# Patient Record
Sex: FEMALE | Race: WHITE | NOT HISPANIC OR LATINO | URBAN - METROPOLITAN AREA
[De-identification: names, ages, dates, MRNs, and addresses within clinical notes are randomized per-mention and may not be internally consistent; named-entity substitution may affect disease eponyms.]

---

## 2020-08-30 ENCOUNTER — INPATIENT (INPATIENT)
Facility: HOSPITAL | Age: 65
LOS: 17 days | Discharge: HOME HEALTH | End: 2020-09-17
Attending: HOSPITALIST | Admitting: HOSPITALIST
Payer: MEDICAID

## 2020-08-30 VITALS
DIASTOLIC BLOOD PRESSURE: 94 MMHG | TEMPERATURE: 98 F | HEART RATE: 105 BPM | SYSTOLIC BLOOD PRESSURE: 134 MMHG | OXYGEN SATURATION: 96 % | RESPIRATION RATE: 19 BRPM

## 2020-08-30 DIAGNOSIS — I26.99 OTHER PULMONARY EMBOLISM WITHOUT ACUTE COR PULMONALE: ICD-10-CM

## 2020-08-30 DIAGNOSIS — R09.89 OTHER SPECIFIED SYMPTOMS AND SIGNS INVOLVING THE CIRCULATORY AND RESPIRATORY SYSTEMS: ICD-10-CM

## 2020-08-30 DIAGNOSIS — R74.0 NONSPECIFIC ELEVATION OF LEVELS OF TRANSAMINASE AND LACTIC ACID DEHYDROGENASE [LDH]: ICD-10-CM

## 2020-08-30 DIAGNOSIS — Z90.89 ACQUIRED ABSENCE OF OTHER ORGANS: Chronic | ICD-10-CM

## 2020-08-30 DIAGNOSIS — Z90.49 ACQUIRED ABSENCE OF OTHER SPECIFIED PARTS OF DIGESTIVE TRACT: Chronic | ICD-10-CM

## 2020-08-30 LAB
ALBUMIN SERPL ELPH-MCNC: 3.7 G/DL — SIGNIFICANT CHANGE UP (ref 3.5–5.2)
ALP SERPL-CCNC: 91 U/L — SIGNIFICANT CHANGE UP (ref 30–115)
ALT FLD-CCNC: 13 U/L — SIGNIFICANT CHANGE UP (ref 0–41)
ANION GAP SERPL CALC-SCNC: 13 MMOL/L — SIGNIFICANT CHANGE UP (ref 7–14)
APTT BLD: 24.3 SEC — LOW (ref 27–39.2)
AST SERPL-CCNC: 18 U/L — SIGNIFICANT CHANGE UP (ref 0–41)
BASE EXCESS BLDV CALC-SCNC: 0.2 MMOL/L — SIGNIFICANT CHANGE UP (ref -2–2)
BASOPHILS # BLD AUTO: 0.02 K/UL — SIGNIFICANT CHANGE UP (ref 0–0.2)
BASOPHILS NFR BLD AUTO: 0.1 % — SIGNIFICANT CHANGE UP (ref 0–1)
BILIRUB SERPL-MCNC: 0.6 MG/DL — SIGNIFICANT CHANGE UP (ref 0.2–1.2)
BUN SERPL-MCNC: 39 MG/DL — HIGH (ref 10–20)
CA-I SERPL-SCNC: 1.15 MMOL/L — SIGNIFICANT CHANGE UP (ref 1.12–1.3)
CALCIUM SERPL-MCNC: 9.1 MG/DL — SIGNIFICANT CHANGE UP (ref 8.5–10.1)
CHLORIDE SERPL-SCNC: 96 MMOL/L — LOW (ref 98–110)
CO2 SERPL-SCNC: 21 MMOL/L — SIGNIFICANT CHANGE UP (ref 17–32)
CREAT SERPL-MCNC: 1 MG/DL — SIGNIFICANT CHANGE UP (ref 0.7–1.5)
D DIMER BLD IA.RAPID-MCNC: 3024 NG/ML DDU — HIGH (ref 0–230)
EOSINOPHIL # BLD AUTO: 0.02 K/UL — SIGNIFICANT CHANGE UP (ref 0–0.7)
EOSINOPHIL NFR BLD AUTO: 0.1 % — SIGNIFICANT CHANGE UP (ref 0–8)
GAS PNL BLDV: 136 MMOL/L — SIGNIFICANT CHANGE UP (ref 136–145)
GAS PNL BLDV: SIGNIFICANT CHANGE UP
GLUCOSE BLDC GLUCOMTR-MCNC: 114 MG/DL — HIGH (ref 70–99)
GLUCOSE SERPL-MCNC: 126 MG/DL — HIGH (ref 70–99)
HCO3 BLDV-SCNC: 24 MMOL/L — SIGNIFICANT CHANGE UP (ref 22–29)
HCT VFR BLD CALC: 32.2 % — LOW (ref 37–47)
HCT VFR BLDA CALC: 34.5 % — SIGNIFICANT CHANGE UP (ref 34–44)
HGB BLD CALC-MCNC: 11.3 G/DL — LOW (ref 14–18)
HGB BLD-MCNC: 10.9 G/DL — LOW (ref 12–16)
IMM GRANULOCYTES NFR BLD AUTO: 0.5 % — HIGH (ref 0.1–0.3)
INR BLD: 1.1 RATIO — SIGNIFICANT CHANGE UP (ref 0.65–1.3)
LACTATE BLDV-MCNC: 1.4 MMOL/L — SIGNIFICANT CHANGE UP (ref 0.5–1.6)
LYMPHOCYTES # BLD AUTO: 1.45 K/UL — SIGNIFICANT CHANGE UP (ref 1.2–3.4)
LYMPHOCYTES # BLD AUTO: 8.2 % — LOW (ref 20.5–51.1)
MAGNESIUM SERPL-MCNC: 1.8 MG/DL — SIGNIFICANT CHANGE UP (ref 1.8–2.4)
MCHC RBC-ENTMCNC: 30.2 PG — SIGNIFICANT CHANGE UP (ref 27–31)
MCHC RBC-ENTMCNC: 33.9 G/DL — SIGNIFICANT CHANGE UP (ref 32–37)
MCV RBC AUTO: 89.2 FL — SIGNIFICANT CHANGE UP (ref 81–99)
MONOCYTES # BLD AUTO: 0.87 K/UL — HIGH (ref 0.1–0.6)
MONOCYTES NFR BLD AUTO: 4.9 % — SIGNIFICANT CHANGE UP (ref 1.7–9.3)
NEUTROPHILS # BLD AUTO: 15.28 K/UL — HIGH (ref 1.4–6.5)
NEUTROPHILS NFR BLD AUTO: 86.2 % — HIGH (ref 42.2–75.2)
NRBC # BLD: 0 /100 WBCS — SIGNIFICANT CHANGE UP (ref 0–0)
NT-PROBNP SERPL-SCNC: 6178 PG/ML — HIGH (ref 0–300)
PCO2 BLDV: 37 MMHG — LOW (ref 41–51)
PH BLDV: 7.42 — SIGNIFICANT CHANGE UP (ref 7.26–7.43)
PLATELET # BLD AUTO: 272 K/UL — SIGNIFICANT CHANGE UP (ref 130–400)
PO2 BLDV: 22 MMHG — SIGNIFICANT CHANGE UP (ref 20–40)
POTASSIUM BLDV-SCNC: 3.9 MMOL/L — SIGNIFICANT CHANGE UP (ref 3.3–5.6)
POTASSIUM SERPL-MCNC: 4.1 MMOL/L — SIGNIFICANT CHANGE UP (ref 3.5–5)
POTASSIUM SERPL-SCNC: 4.1 MMOL/L — SIGNIFICANT CHANGE UP (ref 3.5–5)
PROT SERPL-MCNC: 6.7 G/DL — SIGNIFICANT CHANGE UP (ref 6–8)
PROTHROM AB SERPL-ACNC: 12.6 SEC — SIGNIFICANT CHANGE UP (ref 9.95–12.87)
RBC # BLD: 3.61 M/UL — LOW (ref 4.2–5.4)
RBC # FLD: 12.1 % — SIGNIFICANT CHANGE UP (ref 11.5–14.5)
SAO2 % BLDV: 31 % — SIGNIFICANT CHANGE UP
SARS-COV-2 RNA SPEC QL NAA+PROBE: SIGNIFICANT CHANGE UP
SODIUM SERPL-SCNC: 130 MMOL/L — LOW (ref 135–146)
TROPONIN T SERPL-MCNC: 0.1 NG/ML — CRITICAL HIGH
WBC # BLD: 17.73 K/UL — HIGH (ref 4.8–10.8)
WBC # FLD AUTO: 17.73 K/UL — HIGH (ref 4.8–10.8)

## 2020-08-30 PROCEDURE — 71275 CT ANGIOGRAPHY CHEST: CPT | Mod: 26

## 2020-08-30 PROCEDURE — 93010 ELECTROCARDIOGRAM REPORT: CPT

## 2020-08-30 PROCEDURE — 99291 CRITICAL CARE FIRST HOUR: CPT | Mod: 25

## 2020-08-30 PROCEDURE — 93308 TTE F-UP OR LMTD: CPT | Mod: 26

## 2020-08-30 PROCEDURE — 71045 X-RAY EXAM CHEST 1 VIEW: CPT | Mod: 26

## 2020-08-30 RX ORDER — LEVOTHYROXINE SODIUM 125 MCG
50 TABLET ORAL DAILY
Refills: 0 | Status: DISCONTINUED | OUTPATIENT
Start: 2020-08-30 | End: 2020-09-17

## 2020-08-30 RX ORDER — CHLORHEXIDINE GLUCONATE 213 G/1000ML
1 SOLUTION TOPICAL
Refills: 0 | Status: DISCONTINUED | OUTPATIENT
Start: 2020-08-30 | End: 2020-09-17

## 2020-08-30 RX ORDER — PANTOPRAZOLE SODIUM 20 MG/1
40 TABLET, DELAYED RELEASE ORAL ONCE
Refills: 0 | Status: DISCONTINUED | OUTPATIENT
Start: 2020-08-30 | End: 2020-08-30

## 2020-08-30 RX ORDER — PANTOPRAZOLE SODIUM 20 MG/1
8 TABLET, DELAYED RELEASE ORAL
Qty: 80 | Refills: 0 | Status: DISCONTINUED | OUTPATIENT
Start: 2020-08-30 | End: 2020-08-31

## 2020-08-30 RX ORDER — HEPARIN SODIUM 5000 [USP'U]/ML
1500 INJECTION INTRAVENOUS; SUBCUTANEOUS
Qty: 25000 | Refills: 0 | Status: DISCONTINUED | OUTPATIENT
Start: 2020-08-30 | End: 2020-08-31

## 2020-08-30 RX ORDER — HEPARIN SODIUM 5000 [USP'U]/ML
3000 INJECTION INTRAVENOUS; SUBCUTANEOUS EVERY 6 HOURS
Refills: 0 | Status: DISCONTINUED | OUTPATIENT
Start: 2020-08-30 | End: 2020-08-30

## 2020-08-30 RX ORDER — HEPARIN SODIUM 5000 [USP'U]/ML
6500 INJECTION INTRAVENOUS; SUBCUTANEOUS EVERY 6 HOURS
Refills: 0 | Status: DISCONTINUED | OUTPATIENT
Start: 2020-08-30 | End: 2020-08-30

## 2020-08-30 RX ORDER — HEPARIN SODIUM 5000 [USP'U]/ML
6500 INJECTION INTRAVENOUS; SUBCUTANEOUS ONCE
Refills: 0 | Status: COMPLETED | OUTPATIENT
Start: 2020-08-30 | End: 2020-08-30

## 2020-08-30 RX ORDER — HEPARIN SODIUM 5000 [USP'U]/ML
INJECTION INTRAVENOUS; SUBCUTANEOUS
Qty: 25000 | Refills: 0 | Status: DISCONTINUED | OUTPATIENT
Start: 2020-08-30 | End: 2020-08-30

## 2020-08-30 RX ADMIN — HEPARIN SODIUM 6500 UNIT(S): 5000 INJECTION INTRAVENOUS; SUBCUTANEOUS at 19:09

## 2020-08-30 RX ADMIN — PANTOPRAZOLE SODIUM 10 MG/HR: 20 TABLET, DELAYED RELEASE ORAL at 20:07

## 2020-08-30 RX ADMIN — HEPARIN SODIUM 1500 UNIT(S)/HR: 5000 INJECTION INTRAVENOUS; SUBCUTANEOUS at 19:10

## 2020-08-30 RX ADMIN — HEPARIN SODIUM 15 UNIT(S)/HR: 5000 INJECTION INTRAVENOUS; SUBCUTANEOUS at 20:08

## 2020-08-30 NOTE — H&P ADULT - NSHPSOCIALHISTORY_GEN_ALL_CORE
The patient denies any history of tobacco use; drinks 1/2 glass of wine weekly; denies illicit drug use.

## 2020-08-30 NOTE — H&P ADULT - ATTENDING COMMENTS
Patient seen and examined, agree with above, high risk submassive PE, ? gastric ulcer, on iv heparin, echo, LE doppler, IR for thormbectomy, heparin GI eval, serial PTT

## 2020-08-30 NOTE — ED PROVIDER NOTE - NS ED ROS FT
Constitutional: See HPI.  Eyes: No visual changes, eye pain or discharge.  ENMT: No hearing changes, pain, discharge or infections.   Cardiac: + chest pain, + dyspnea on exertion, + chest pain with exertion.  Respiratory: + cough. No  respiratory distress. No hemoptysis.   GI: No nausea, vomiting, diarrhea or abdominal pain.  : No dysuria, frequency or burning. No Discharge  MS: No myalgia, muscle weakness, joint pain or back pain.  Neuro: No headache or weakness.   Skin: No skin rash.  Except as documented in the HPI, all other systems are negative.

## 2020-08-30 NOTE — H&P ADULT - ASSESSMENT
The patient is a 64 year-old female with a PMH of HTN and hypothyroidism and a PSH of appendectomy and tonsillectomy presenting for shortness of breath, admitted for pulmonary embolism.            # CHG  # DVT ppx- heparin gtt  # GI ppx- pantoprazole gtt  # Diet- NPO after midnight for thrombectomy in the AM  # Activity- bedrest for now  # Dispo- acute pending possible thrombectomy, on hep gtt The patient is a 64 year-old female with a PMH of HTN and hypothyroidism and a PSH of appendectomy and tonsillectomy presenting for shortness of breath, admitted for pulmonary embolism.    # Shortness of breath likely secondary to acute pulmonary embolism  Trops 0.10 on presentation  CT angio showed b/l central PE extending to subsegmental branches w/ evidence of R heart strain  C/w heparin gtt; serial aPTTs (keep 50-60 as recent h/o gastric ulcer)  F/u IR for thrombectomy in AM  F/u 4:30 AM and 11 AM trops, AM CBC, BMP, Mg  Vitals per ICU protocol  Admit to ICU    # Gastric ulcer at pylorus  No evidence of active bleed  C/w Protonix gtt for now  F/u AM CBC    # H/o hypertension  Patient reports was prescribed lisinopril but stopped taking it several weeks ago as BP normalized  Vitals per routine  Restart agent as indicated    # CHG  # DVT ppx- heparin gtt  # GI ppx- pantoprazole gtt  # Diet- NPO after midnight for thrombectomy in the AM  # Activity- bedrest for now  # Dispo- acute pending possible thrombectomy, on hep gtt

## 2020-08-30 NOTE — ED PROVIDER NOTE - ATTENDING CONTRIBUTION TO CARE
64 y.o male w/ hx of DM, HTN presents to the ED for evaluation of NUNO X 3, assocaited with midsternal chest pain. pt has no headache, abd pain, weakness, numness. on exam pt mild tachypneic, s1s2 ctab soft nt/nd, no calf swelling.    impression sob, bedside echo showing large RV, plan for CT chest PE, labs, cardiac biomarks, ekg and re-eval

## 2020-08-30 NOTE — CONSULT NOTE ADULT - SUBJECTIVE AND OBJECTIVE BOX
INTERVENTIONAL RADIOLOGY CONSULT:     Procedure Requested: Pulmonary artery thrombectomy    HPI: 64 y.o female w/ hx of DM, HTN presents to the ED for evaluation of dyspnea.  For past 3 days NUNO, after walking a few steps.  Associated w/ midsternal chest pain, pleuritic, moderate severity, no radiation of pain. family reports pt recently complaining of lower back and L hip pain.  Had CT 8/24 which showed abnormality of stomach.  Had upper endoscopy which showed extensive gastric ulcer and was started on PPI. On CT had abnormal finding of femur concern for malignancy.  Pt also complaining of lumbar back pain, had MRI which showed severe spinal stenosis.  Pt has no known malignancies and no known cardiac hx. No fever, chills, cough, back pain, urinary sxs      PAST MEDICAL & SURGICAL HISTORY:  Diabetes  HTN (hypertension)  Gastric ulcer      MEDICATIONS  (STANDING):  chlorhexidine 4% Liquid 1 Application(s) Topical <User Schedule>  heparin  Infusion 1500 Unit(s)/Hr (15 mL/Hr) IV Continuous <Continuous>  pantoprazole Infusion 8 mG/Hr (10 mL/Hr) IV Continuous <Continuous>    MEDICATIONS  (PRN):      Allergies    No Known Allergies      Physical Exam:   Vital Signs Last 24 Hrs  T(C): 36.4 (30 Aug 2020 15:45), Max: 36.4 (30 Aug 2020 15:45)  T(F): 97.6 (30 Aug 2020 15:45), Max: 97.6 (30 Aug 2020 15:45)  HR: 99 (30 Aug 2020 19:28) (95 - 105)  BP: 120/73 (30 Aug 2020 19:28) (120/73 - 134/94)  BP(mean): --  RR: 20 (30 Aug 2020 19:28) (19 - 20)  SpO2: 96% (30 Aug 2020 19:28) (96% - 96%)      Labs:                         10.9   17.73 )-----------( 272      ( 30 Aug 2020 17:01 )             32.2     08-30    130<L>  |  96<L>  |  39<H>  ----------------------------<  126<H>  4.1   |  21  |  1.0    Ca    9.1      30 Aug 2020 17:01  Mg     1.8     08-30    TPro  6.7  /  Alb  3.7  /  TBili  0.6  /  DBili  x   /  AST  18  /  ALT  13  /  AlkPhos  91  08-30    PT/INR - ( 30 Aug 2020 17:01 )   PT: 12.60 sec;   INR: 1.10 ratio         PTT - ( 30 Aug 2020 17:01 )  PTT:24.3 sec    Pertinent labs:                      10.9   17.73 )-----------( 272      ( 30 Aug 2020 17:01 )             32.2       08-30    130<L>  |  96<L>  |  39<H>  ----------------------------<  126<H>  4.1   |  21  |  1.0    Ca    9.1      30 Aug 2020 17:01  Mg     1.8     08-30    TPro  6.7  /  Alb  3.7  /  TBili  0.6  /  DBili  x   /  AST  18  /  ALT  13  /  AlkPhos  91  08-30      PT/INR - ( 30 Aug 2020 17:01 )   PT: 12.60 sec;   INR: 1.10 ratio         PTT - ( 30 Aug 2020 17:01 )  PTT:24.3 sec    Radiology & Additional Studies:     Radiology imaging reviewed.       ASSESSMENT/ PLAN:     CTA reviewed. Right greater than left PA thrombus with evidence of right heart strain. HD significant for tachycardia.    - high risk submassive PE  - please trend cardiac enzymes.  - continue systemic AC and NPO tonight after midnight for pulmonary angiogram tomorrow with possible thrombectomy  - please obtain 2D Echo and lower extremity dopplers.    Case discussed with ICU fellow.    Thank you for the courtesy of this consult, please call [phone] with any further questions.

## 2020-08-30 NOTE — H&P ADULT - NSHPPHYSICALEXAM_GEN_ALL_CORE
Vital Signs Last 24 Hrs  T(C): 36.6 (30 Aug 2020 22:30), Max: 36.6 (30 Aug 2020 22:30)  T(F): 97.9 (30 Aug 2020 22:30), Max: 97.9 (30 Aug 2020 22:30)  HR: 100 (30 Aug 2020 22:30) (93 - 105)  BP: 151/86 (30 Aug 2020 22:30) (120/73 - 151/86)  BP(mean): 106 (30 Aug 2020 22:30) (106 - 106)  RR: 28 (30 Aug 2020 22:30) (19 - 28)  SpO2: 99% (30 Aug 2020 22:30) (96% - 99%)    Physical examination:  General: AAOx4, NAD, of pleasant disposition  HEENT: atraumatic, normocephalic  CV: NS1, S2, slightly tachycardic; no murmurs, rubs, gallops  Pulm: clear to auscultation bilaterally; slightly tachypneic; non-cyanotic; on rm air  Gastrointestinal: soft, nontender, nondistended  Extremities: non-edematous; non-erythematous  Neuro: nonfocal; nondysarthric

## 2020-08-30 NOTE — H&P ADULT - HISTORY OF PRESENT ILLNESS
The patient is a 64 year-old female with a PMH of HTN and hypothyroidism and a PSH of appendectomy and tonsillectomy presenting for shortness of breath. The patient reports that on Thursday she began experiencing shortness of breath on minimal ambulation associated w/ weakness and dizziness; she denied associated chest pain or pressure; as the shortness of breath worsened she presented to the ED today.    Of note, about 1 week ago she started having pain in the left hip; had an MRI done which showed spinal stenosis at L3-L4 and L4-L5; was given a "steroid shot" which did not help the pain. Around the same time the patient began complaining of nausea; started to have non-bloody non-bilious vomiting; had an EGD in [place] (records faxed to us, in chart) which revealed thickening of the gastric pylorus with a deep ulcer suspicious for perforation but with no free air; she was started on a PPI BID which she reports alleviated the nausea and vomiting.     In the ED, T 97.6 F, , /94, RR 19; O2 sat 94%. CT angio chest revealed bilateral central pulmonary embolism extending into subsegmental branches w/ evidence of right heart strain. She was started on a heparin gtt; IR was c/s and are planning to do a thrombectomy tomorrow in the AM. The patient reports no recent history of trauma or any history of blood clotting disorders either in herself or in her family. The patient is a 64 year-old female with a PMH of HTN and hypothyroidism and a PSH of appendectomy and tonsillectomy presenting for shortness of breath. The patient reports that on Thursday she began experiencing shortness of breath on minimal ambulation associated w/ weakness and dizziness; she denied associated chest pain or pressure; as the shortness of breath worsened she presented to the ED today.    Of note, about 1 week ago she started having pain in the left hip; had an MRI done which showed spinal stenosis at L3-L4 and L4-L5; was given a "steroid shot" which did not help the pain. Around the same time the patient began complaining of nausea; started to have non-bloody non-bilious vomiting; had an EGD in [place] (records faxed to us, in chart) which revealed thickening of the gastric pylorus with a deep ulcer suspicious for perforation but with no free air; she was started on a PPI BID which she reports alleviated the nausea and vomiting.     In the ED, T 97.6 F, , /94, RR 19; O2 sat 94%. CT angio chest revealed bilateral central pulmonary embolism extending into subsegmental branches w/ evidence of right heart strain. She was started on a heparin gtt; IR was c/s and are planning to do a thrombectomy tomorrow in the AM. The patient reports no recent history of trauma or any history of blood clotting disorders either in herself or in her family. pert called started on heparin and protonix drip hb stable

## 2020-08-30 NOTE — H&P ADULT - NSHPLABSRESULTS_GEN_ALL_CORE
10.9   17.73 )-----------( 272      ( 30 Aug 2020 17:01 )             32.2   08-30    130<L>  |  96<L>  |  39<H>  ----------------------------<  126<H>  4.1   |  21  |  1.0    Ca    9.1      30 Aug 2020 17:01  Mg     1.8     08-30    TPro  6.7  /  Alb  3.7  /  TBili  0.6  /  DBili  x   /  AST  18  /  ALT  13  /  AlkPhos  91  08-30    < from: CT Angio Chest w/ IV Cont (08.30.20 @ 18:00) >    INTERPRETATION:  CLINICAL HISTORY / REASON FOR EXAM: Shortness of breath    TECHNIQUE: Multislice helical sections were obtained from the thoracic inlet to the lung bases during rapid administration of 100 mL of Omnipaque 350 intravenous contrast using a CTA pulmonary embolism protocol. Thin sections were reconstructed through the pulmonary vasculature. Coronal, sagittal and 3D/MIP reformatted images are also submitted.    COMPARISON: None..    FINDINGS:    PULMONARY EMBOLUS:Bilateral central pulmonary embolism extending into subsegmental branches.    TUBES/LINES: None.    LUNGS, PLEURA, AIRWAYS: No pneumothorax. No consolidation or pleural effusion. Central airways patent.    THORACIC NODES: No mediastinal or axillary lymphadenopathy.    MEDIASTINUM/GREAT VESSELS: There is evidence of right heart strain with a RV to LV ratio of 4:2. No pericardial effusion. The great vessels are normal in caliber..    VISUALIZED UPPER ABDOMEN: Unremarkable.    BONES/SOFT TISSUES: Degenerative changes of the thoracic spine.    IMPRESSION:    1.  Bilateral central pulmonary embolism extending into subsegmental branches.  2.  Evidence of right heart strain.      ***Please see the addendum at the top of this report. It may contain additional important information or changes.****    -----  < from: Xray Chest 1 View-PORTABLE IMMEDIATE (08.30.20 @ 18:53) >    INTERPRETATION:  Clinical History / Reason for exam: Chest pain, pulmonary emboli    Comparison : Chest radiograph concurrent chest CT    Technique/Positioning: AP portable.    Findings:    Support devices: None.    Cardiac/mediastinum/hilum: Tortuous aorta without cardiomegaly.    Lung parenchyma/Pleura: Within normal limits.    Skeleton/soft tissues: Unremarkable.    Impression:    No radiographic evidence of acute cardiopulmonary disease.

## 2020-08-30 NOTE — ED PROVIDER NOTE - PHYSICAL EXAMINATION
CONST: Well appearing in NAD  EYES:  Sclera and conjunctiva clear.  CARD: tachycardic, regular  RESP: + tachypnea, + accessory muscle use, not table to speak in full sentences, Equal BS B/L, moderate distress, spo2 90% Ra, 92% on 2 L, 96% on 4L  GI: Soft, non-tender, non-distended.  MS: Normal ROM in all extremities. 2+ edema of lower extremities, no calf pain, radial pulses 2+ bilaterally  SKIN: Warm, dry, no acute rashes. Good turgor  NEURO: A&Ox3, No focal deficits. Strength 5/5 with no sensory deficits.

## 2020-08-30 NOTE — ED PROVIDER NOTE - PROGRESS NOTE DETAILS
bedside with rv dilation pt taken to ct urgently for suspected PE Discussed case w/ pulm fellow.  aware of pt. admit to ICU under elsayegh.  will contact IR and see if pert tram needs to be activated. Discussed case w/ pulm fellow cecilia.  aware of pt. admit to ICU under elsannalisaeglucita.  will contact IR and see if pert tram needs to be activated. Discussed case w/ ICU fellow. received results of ct from family via fax.  States deep gastric ulcer.  per family on PPI for them.  Spoke w/ Dr. garland(family friend) who spoke w/ GI who scoped pt.  if giving heparin, give PPI.  Will hold on heparin for now and icu will speak w/ IR for potential treatment options. pt presented with NUNO X 3 days, chest pain, pt with mild elev trop of 0.1, , elevated BNP, ct angio showing bl segmental PE with heart strain. icu, pe response team consulted. pt well appearing, bp stable admitted to ICU

## 2020-08-30 NOTE — ED PROVIDER NOTE - OBJECTIVE STATEMENT
64 y.o male w/ hx of DM, HTN presents to the ED for evaluation of dyspnea.  For past 3 days NUNO, after walking a few steps bust stope to rest.  Associated w/ midsternal chest pain, pleuritic, moderate severity, no radiation of pain. family reports pt recently complaining of lower back and L hip pain.  Had CT 8/24 which showed abnormality of stomach.  Had upper endoscopy which showed extensive gastric ulcer and was started on PPI. On CT had abnormal finding of femur concern for malignancy.  Pt also complaining of lumbar back pain, had MRI which showed severe spinal stenosis.  Pt has no known malignancies and no known cardiac hx. No fever, chills, cough, back pain, urinary sxs.

## 2020-08-30 NOTE — ED PROVIDER NOTE - CLINICAL SUMMARY MEDICAL DECISION MAKING FREE TEXT BOX
pt presented with NUNO X 3 days, chest pain, pt with mild elev trop of 0.1, , elevated BNP, ct angio showing bl segmental PE with heart strain. icu, pe response team consulted. pt well appearing, bp stable admitted to ICU.

## 2020-08-30 NOTE — ED PROVIDER NOTE - LANGUAGE ASSISTANCE NEEDED
No-Patient/Caregiver offered and refused free interpretation services. [id number]/No-Patient/Caregiver offered and refused free interpretation services.

## 2020-08-31 LAB
ALBUMIN SERPL ELPH-MCNC: 3.5 G/DL — SIGNIFICANT CHANGE UP (ref 3.5–5.2)
ALP SERPL-CCNC: 84 U/L — SIGNIFICANT CHANGE UP (ref 30–115)
ALT FLD-CCNC: 11 U/L — SIGNIFICANT CHANGE UP (ref 0–41)
ANION GAP SERPL CALC-SCNC: 16 MMOL/L — HIGH (ref 7–14)
APTT BLD: 100.9 SEC — CRITICAL HIGH (ref 27–39.2)
APTT BLD: 117.7 SEC — CRITICAL HIGH (ref 27–39.2)
APTT BLD: 82.8 SEC — CRITICAL HIGH (ref 27–39.2)
APTT BLD: 92.7 SEC — CRITICAL HIGH (ref 27–39.2)
AST SERPL-CCNC: 16 U/L — SIGNIFICANT CHANGE UP (ref 0–41)
BASOPHILS # BLD AUTO: 0.03 K/UL — SIGNIFICANT CHANGE UP (ref 0–0.2)
BASOPHILS NFR BLD AUTO: 0.2 % — SIGNIFICANT CHANGE UP (ref 0–1)
BILIRUB SERPL-MCNC: 0.7 MG/DL — SIGNIFICANT CHANGE UP (ref 0.2–1.2)
BUN SERPL-MCNC: 34 MG/DL — HIGH (ref 10–20)
CALCIUM SERPL-MCNC: 8.9 MG/DL — SIGNIFICANT CHANGE UP (ref 8.5–10.1)
CHLORIDE SERPL-SCNC: 97 MMOL/L — LOW (ref 98–110)
CO2 SERPL-SCNC: 19 MMOL/L — SIGNIFICANT CHANGE UP (ref 17–32)
CREAT SERPL-MCNC: 0.9 MG/DL — SIGNIFICANT CHANGE UP (ref 0.7–1.5)
EOSINOPHIL # BLD AUTO: 0.12 K/UL — SIGNIFICANT CHANGE UP (ref 0–0.7)
EOSINOPHIL NFR BLD AUTO: 0.8 % — SIGNIFICANT CHANGE UP (ref 0–8)
GLUCOSE SERPL-MCNC: 116 MG/DL — HIGH (ref 70–99)
HCT VFR BLD CALC: 31.9 % — LOW (ref 37–47)
HCV AB S/CO SERPL IA: 0.04 COI — SIGNIFICANT CHANGE UP
HCV AB SERPL-IMP: SIGNIFICANT CHANGE UP
HGB BLD-MCNC: 10.5 G/DL — LOW (ref 12–16)
IMM GRANULOCYTES NFR BLD AUTO: 0.5 % — HIGH (ref 0.1–0.3)
INR BLD: 1.2 RATIO — SIGNIFICANT CHANGE UP (ref 0.65–1.3)
LYMPHOCYTES # BLD AUTO: 15.4 % — LOW (ref 20.5–51.1)
LYMPHOCYTES # BLD AUTO: 2.36 K/UL — SIGNIFICANT CHANGE UP (ref 1.2–3.4)
MAGNESIUM SERPL-MCNC: 1.9 MG/DL — SIGNIFICANT CHANGE UP (ref 1.8–2.4)
MCHC RBC-ENTMCNC: 29.7 PG — SIGNIFICANT CHANGE UP (ref 27–31)
MCHC RBC-ENTMCNC: 32.9 G/DL — SIGNIFICANT CHANGE UP (ref 32–37)
MCV RBC AUTO: 90.1 FL — SIGNIFICANT CHANGE UP (ref 81–99)
MONOCYTES # BLD AUTO: 1.17 K/UL — HIGH (ref 0.1–0.6)
MONOCYTES NFR BLD AUTO: 7.6 % — SIGNIFICANT CHANGE UP (ref 1.7–9.3)
NEUTROPHILS # BLD AUTO: 11.55 K/UL — HIGH (ref 1.4–6.5)
NEUTROPHILS NFR BLD AUTO: 75.5 % — HIGH (ref 42.2–75.2)
NRBC # BLD: 0 /100 WBCS — SIGNIFICANT CHANGE UP (ref 0–0)
PLATELET # BLD AUTO: 263 K/UL — SIGNIFICANT CHANGE UP (ref 130–400)
POTASSIUM SERPL-MCNC: 3.8 MMOL/L — SIGNIFICANT CHANGE UP (ref 3.5–5)
POTASSIUM SERPL-SCNC: 3.8 MMOL/L — SIGNIFICANT CHANGE UP (ref 3.5–5)
PROT SERPL-MCNC: 6.2 G/DL — SIGNIFICANT CHANGE UP (ref 6–8)
PROTHROM AB SERPL-ACNC: 13.8 SEC — HIGH (ref 9.95–12.87)
RBC # BLD: 3.54 M/UL — LOW (ref 4.2–5.4)
RBC # FLD: 12.2 % — SIGNIFICANT CHANGE UP (ref 11.5–14.5)
SARS-COV-2 IGG SERPL QL IA: NEGATIVE — SIGNIFICANT CHANGE UP
SARS-COV-2 IGM SERPL IA-ACNC: 0.08 INDEX — SIGNIFICANT CHANGE UP
SODIUM SERPL-SCNC: 132 MMOL/L — LOW (ref 135–146)
TROPONIN T SERPL-MCNC: 0.08 NG/ML — CRITICAL HIGH
WBC # BLD: 15.3 K/UL — HIGH (ref 4.8–10.8)
WBC # FLD AUTO: 15.3 K/UL — HIGH (ref 4.8–10.8)

## 2020-08-31 PROCEDURE — 37185 PRIM ART M-THRMBC SBSQ VSL: CPT

## 2020-08-31 PROCEDURE — 99152 MOD SED SAME PHYS/QHP 5/>YRS: CPT

## 2020-08-31 PROCEDURE — 76937 US GUIDE VASCULAR ACCESS: CPT | Mod: 26

## 2020-08-31 PROCEDURE — 99232 SBSQ HOSP IP/OBS MODERATE 35: CPT

## 2020-08-31 PROCEDURE — 36015 PLACE CATHETER IN ARTERY: CPT | Mod: RT,59

## 2020-08-31 PROCEDURE — 71045 X-RAY EXAM CHEST 1 VIEW: CPT | Mod: 26

## 2020-08-31 PROCEDURE — 93306 TTE W/DOPPLER COMPLETE: CPT | Mod: 26

## 2020-08-31 PROCEDURE — 37184 PRIM ART M-THRMBC 1ST VSL: CPT | Mod: 50

## 2020-08-31 PROCEDURE — 93970 EXTREMITY STUDY: CPT | Mod: 26

## 2020-08-31 RX ORDER — HEPARIN SODIUM 5000 [USP'U]/ML
1000 INJECTION INTRAVENOUS; SUBCUTANEOUS
Qty: 25000 | Refills: 0 | Status: DISCONTINUED | OUTPATIENT
Start: 2020-08-31 | End: 2020-09-02

## 2020-08-31 RX ORDER — OXYCODONE AND ACETAMINOPHEN 5; 325 MG/1; MG/1
1 TABLET ORAL EVERY 6 HOURS
Refills: 0 | Status: DISCONTINUED | OUTPATIENT
Start: 2020-08-31 | End: 2020-09-07

## 2020-08-31 RX ORDER — HEPARIN SODIUM 5000 [USP'U]/ML
1400 INJECTION INTRAVENOUS; SUBCUTANEOUS
Qty: 25000 | Refills: 0 | Status: DISCONTINUED | OUTPATIENT
Start: 2020-08-31 | End: 2020-08-31

## 2020-08-31 RX ORDER — PANTOPRAZOLE SODIUM 20 MG/1
40 TABLET, DELAYED RELEASE ORAL
Refills: 0 | Status: DISCONTINUED | OUTPATIENT
Start: 2020-08-31 | End: 2020-09-06

## 2020-08-31 RX ADMIN — PANTOPRAZOLE SODIUM 40 MILLIGRAM(S): 20 TABLET, DELAYED RELEASE ORAL at 17:56

## 2020-08-31 RX ADMIN — OXYCODONE AND ACETAMINOPHEN 1 TABLET(S): 5; 325 TABLET ORAL at 03:00

## 2020-08-31 RX ADMIN — Medication 50 MICROGRAM(S): at 07:04

## 2020-08-31 RX ADMIN — HEPARIN SODIUM 10 UNIT(S)/HR: 5000 INJECTION INTRAVENOUS; SUBCUTANEOUS at 20:53

## 2020-08-31 RX ADMIN — OXYCODONE AND ACETAMINOPHEN 1 TABLET(S): 5; 325 TABLET ORAL at 02:23

## 2020-08-31 NOTE — PROGRESS NOTE ADULT - SUBJECTIVE AND OBJECTIVE BOX
INTERVENTIONAL RADIOLOGY BRIEF-OPERATIVE NOTE    Procedure: Pulmonary angiogram, suction thrombectomy bilateral pulmonary arteries    Pre-Op Diagnosis: Pulmonary embolus    Post-Op Diagnosis: Same    Attending: Elliot Landau  Resident: None    Anesthesia (type):  [ ] General Anesthesia  [x] Sedation  [ ] Spinal Anesthesia  [x] Local/Regional    Contrast: 75 cc Optiray    Estimated Blood Loss: Approximately 75 cc    Condition:   [ ] Critical  [ ] Serious  [x] Fair   [ ] Good    Findings/Follow up Plan of Care: Pulmonary angiogram demonstrated extensive filling defects bilaterally. Suction thrombectomy performed using Inari device, aspiration of extensive clot throughout both lungs. Post thrombectomy angiogram demonstrated resolution. Patient tolerated procedure well. Right femoral access hemostasis achieved with manual compression.    Specimens Removed: None    Implants: None    Complications: None    Disposition: Return to ICU. Please restart systemic AC.      Please call Interventional Radiology [phone] with any questions, concerns, or issues.

## 2020-08-31 NOTE — PROGRESS NOTE ADULT - SUBJECTIVE AND OBJECTIVE BOX
SUBJECTIVE:    Patient is a 64y old Female who presents with a chief complaint of pulmonary embolism (31 Aug 2020 11:42)    Currently admitted to medicine with the primary diagnosis of Pulmonary embolism     Today is hospital day 1d. This morning she is resting comfortably in bed and reports no new issues or overnight events.     ROS:   CONSTITUTIONAL: No weakness, fevers or chills   EYES/ENT: No visual changes; No vertigo or throat pain   NECK: No pain or stiffness   RESPIRATORY: No cough, wheezing, hemoptysis; No shortness of breath   CARDIOVASCULAR: No chest pain or palpitations   GASTROINTESTINAL: No abdominal or epigastric pain. No nausea, vomiting, or hematemesis; No diarrhea or constipation. No melena or hematochezia.  GENITOURINARY: No dysuria, frequency or hematuria  NEUROLOGICAL: No numbness or weakness  SKIN: No itching, rashes      PAST MEDICAL & SURGICAL HISTORY  HTN (hypertension)  Gastric ulcer  S/P tonsillectomy  S/P appendectomy    SOCIAL HISTORY:    ALLERGIES:  No Known Allergies    MEDICATIONS:  STANDING MEDICATIONS  chlorhexidine 4% Liquid 1 Application(s) Topical <User Schedule>  heparin  Infusion 1400 Unit(s)/Hr IV Continuous <Continuous>  levothyroxine 50 MICROGram(s) Oral daily  pantoprazole  Injectable 40 milliGRAM(s) IV Push two times a day    PRN MEDICATIONS  oxycodone    5 mG/acetaminophen 325 mG 1 Tablet(s) Oral every 6 hours PRN    VITALS:   T(F): 97.2  HR: 86  BP: 110/767  RR: 20  SpO2: 98%    LABS:  Negative for smoking/alcohol/drug use.                         10.5   15.30 )-----------( 263      ( 31 Aug 2020 04:24 )             31.9     08-31    132<L>  |  97<L>  |  34<H>  ----------------------------<  116<H>  3.8   |  19  |  0.9    Ca    8.9      31 Aug 2020 04:24  Mg     1.9     08-31    TPro  6.2  /  Alb  3.5  /  TBili  0.7  /  DBili  x   /  AST  16  /  ALT  11  /  AlkPhos  84  08-31    PT/INR - ( 31 Aug 2020 04:24 )   PT: 13.80 sec;   INR: 1.20 ratio         PTT - ( 31 Aug 2020 04:24 )  PTT:100.9 sec      Troponin T, Serum: 0.08 ng/mL <HH> (08-31-20 @ 04:24)  Troponin T, Serum: 0.10 ng/mL <HH> (08-30-20 @ 17:01)      CARDIAC MARKERS ( 31 Aug 2020 04:24 )  x     / 0.08 ng/mL / x     / x     / x      CARDIAC MARKERS ( 30 Aug 2020 17:01 )  x     / 0.10 ng/mL / x     / x     / x          RADIOLOGY:  no new today  PHYSICAL EXAM:  GEN: No acute distress  HEENT: normocephalic, atraumatic, aniceteric  LUNGS: Clear to auscultation bilaterally, no rales/wheezing/ rhonchi  HEART: S1/S2 present. RRR, no murmurs  ABD: Soft, non-tender, non-distended. Bowel sounds present  EXT: NC/NC/NE/2+PP/FRANCES  NEURO: AAOX3, normal affect      ASSESSMENT AND PLAN:    The patient is a 64 year-old female with a PMH of HTN and hypothyroidism and a PSH of appendectomy and tonsillectomy presenting for shortness of breath, admitted for pulmonary embolism.    # Shortness of breath secondary to submassive acute pulmonary embolism  s/p thrombectomy today w/ IR  Trops 0.10 on presentation  CT angio showed b/l central PE extending to subsegmental branches w/ evidence of R heart strain  C/w heparin gtt; serial aPTTs (keep 50-60 as recent h/o gastric ulcer)  f/u LE duplex  f/u TTE      # Gastric ulcer at pylorus  No evidence of active bleed  C/w Protonix gtt for now , call GI if there is active bleed/ drop in Hb  F/u AM CBC    # H/o hypertension  Patient reports was prescribed lisinopril but stopped taking it several weeks ago as BP normalized  Vitals per routine  Restart agent as indicated    # CHG  # DVT ppx- heparin gtt  # GI ppx- pantoprazole gtt  # Diet- advance as tolerated   # Activity- bedrest for now  # Dispo- acute

## 2020-08-31 NOTE — CONSULT NOTE ADULT - SUBJECTIVE AND OBJECTIVE BOX
Gastroenterology Consultation:    Patient is a 64y old  Female who presents with a chief complaint of pulmonary embolism (31 Aug 2020 13:56)      Admitted on: 08-30-20  HPI:  The patient is a 64 year-old female with a PMH of HTN, Recently diagnosed PUD,  and hypothyroidism and a PSH of appendectomy and tonsillectomy presenting for shortness of breath. The patient reports that on Thursday she began experiencing shortness of breath on minimal ambulation associated w/ weakness and dizziness; she denied associated chest pain or pressure; as the shortness of breath worsened she presented to the ED . CT angio in ER showed b/l PE with right heart strain. She underwent thrombectomy buy IR.    GI consulted regarding AC in the context of recently diagnosed PUD. About 1 week ago she started having pain in the left hip; had an MRI done which showed spinal stenosis at L3-L4 and L4-L5; was given a "steroid shot" which did not help the pain. Around the same time the patient began complaining of nausea; started to have non-bloody non-bilious vomiting; had an EGD in [place] (records faxed to us, in chart) which revealed posterior   In the ED, T 97.6 F, , /94, RR 19; O2 sat 94%. CT angio chest revealed bilateral central pulmonary embolism extending into subsegmental branches w/ evidence of right heart strain. She was started on a heparin gtt; IR was c/s and are planning to do a thrombectomy tomorrow in the AM. The patient reports no recent history of trauma or any history of blood clotting disorders either in herself or in her family. pert called started on heparin and protonix drip hb stable (30 Aug 2020 22:24)      Prior records Reviewed (Y/N):  History obtained from person other than patient (Y/N):    Prior EGD:  Prior Colonoscopy:      PAST MEDICAL & SURGICAL HISTORY:  HTN (hypertension)  Gastric ulcer  S/P tonsillectomy  S/P appendectomy      FAMILY HISTORY:  Family history of early CAD: son      Social History:  Tobacco:  Alcohol:  Drugs:    Home Medications:  omeprazole 40 mg oral delayed release capsule: 1 cap(s) orally 2 times a day (30 Aug 2020 19:41)  Synthroid 50 mcg (0.05 mg) oral tablet: 1 tab(s) orally once a day (30 Aug 2020 22:06)    MEDICATIONS  (STANDING):  chlorhexidine 4% Liquid 1 Application(s) Topical <User Schedule>  heparin  Infusion 1400 Unit(s)/Hr (14 mL/Hr) IV Continuous <Continuous>  levothyroxine 50 MICROGram(s) Oral daily  pantoprazole  Injectable 40 milliGRAM(s) IV Push two times a day    MEDICATIONS  (PRN):  oxycodone    5 mG/acetaminophen 325 mG 1 Tablet(s) Oral every 6 hours PRN Severe Pain (7 - 10)      Allergies  No Known Allergies      Review of Systems:   Constitutional:  No Fever, No Chills  ENT/Mouth:  No Hearing Changes,  No Difficulty Swallowing  Eyes:  No Eye Pain, No Vision Changes  Cardiovascular:  No Chest Pain, No Palpitations  Respiratory:  No Cough, No Dyspnea  Gastrointestinal:  As described in HPI  Musculoskeletal:  No Joint Swelling, No Back Pain  Skin:  No Skin Lesions, No Jaundice  Neuro:  No Syncope, No Dizziness  Heme/Lymph:  No Bruising, No Bleeding.          Physical Examination:  T(C): 36.2 (08-31-20 @ 12:00), Max: 36.6 (08-30-20 @ 22:30)  HR: 86 (08-31-20 @ 14:30) (80 - 100)  BP: 117/67 (08-31-20 @ 14:30) (101/67 - 151/86)  RR: 21 (08-31-20 @ 14:30) (17 - 28)  SpO2: 98% (08-31-20 @ 14:30) (96% - 100%)  Height (cm): 162.6 (08-30-20 @ 22:30)  Weight (kg): 84.3 (08-30-20 @ 22:30)    08-30-20 @ 07:01  -  08-31-20 @ 07:00  --------------------------------------------------------  IN: 272 mL / OUT: 200 mL / NET: 72 mL    08-31-20 @ 07:01  -  08-31-20 @ 16:20  --------------------------------------------------------  IN: 116 mL / OUT: 75 mL / NET: 41 mL        Constitutional: No acute distress.  Eyes:. Conjunctivae are clear, Sclera is non-icteric.  Ears Nose and Throat: The external ears are normal appearing,  Oral mucosa is pink and moist.  Respiratory:  No signs of respiratory distress. Lung sounds are clear bilaterally.  Cardiovascular:  S1 S2, Regular rate and rhythm.  GI: Abdomen is soft, symmetric, and non-tender without distention. There are no visible lesions. Bowel sounds are present and normoactive in all four quadrants. No masses, hepatomegaly, or splenomegaly are noted.   Neuro: No Tremor, No involuntary movements  Skin: No rashes, No Jaundice.          Data: (reviewed by attending)                        10.5   15.30 )-----------( 263      ( 31 Aug 2020 04:24 )             31.9     Hgb Trend:  10.5  08-31-20 @ 04:24  10.9  08-30-20 @ 17:01      08-31-20 @ 07:01  -  08-31-20 @ 16:20  --------------------------------------------------------  IN: 0 mL      08-31    132<L>  |  97<L>  |  34<H>  ----------------------------<  116<H>  3.8   |  19  |  0.9    Ca    8.9      31 Aug 2020 04:24  Mg     1.9     08-31    TPro  6.2  /  Alb  3.5  /  TBili  0.7  /  DBili  x   /  AST  16  /  ALT  11  /  AlkPhos  84  08-31    Liver panel trend:  TBili 0.7   /   AST 16   /   ALT 11   /   AlkP 84   /   Tptn 6.2   /   Alb 3.5    /   DBili --      08-31  TBili 0.6   /   AST 18   /   ALT 13   /   AlkP 91   /   Tptn 6.7   /   Alb 3.7    /   DBili --      08-30      PT/INR - ( 31 Aug 2020 04:24 )   PT: 13.80 sec;   INR: 1.20 ratio         PTT - ( 31 Aug 2020 04:24 )  PTT:100.9 sec        Radiology:(reviewed by attending) Gastroenterology Consultation:    Patient is a 64y old  Female who presents with a chief complaint of pulmonary embolism (31 Aug 2020 13:56)      Admitted on: 08-30-20  HPI:  The patient is a 64 year-old female with a PMH of HTN, Recently diagnosed PUD,  and hypothyroidism and a PSH of appendectomy and tonsillectomy presenting for shortness of breath. The patient reports that on Thursday she began experiencing shortness of breath on minimal ambulation associated w/ weakness and dizziness; she denied associated chest pain or pressure; as the shortness of breath worsened she presented to the ED . CT angio in ER showed b/l PE with right heart strain. She underwent thrombectomy buy IR.    GI consulted regarding AC in the context of recently diagnosed PUD. About 1 week ago she started having pain in the left hip; had an MRI done which showed spinal stenosis at L3-L4 and L4-L5; was given a "steroid shot" which did not help the pain. Around the same time the patient began complaining of nausea; started to have non-bloody non-bilious vomiting; had an EGD in [place] (records faxed to us, in chart) which revealed posterior wall duodenal bulb ulcer and antral ulcer. Patient started on PPI BID.   Patient denies any abdominal pain, melena , hematemesis or hematochezia.        Prior records Reviewed (Y/N): Y  History obtained from person other than patient (Y/N): N    Prior EGD: in the chart, 8/27/20  Prior Colonoscopy: None      PAST MEDICAL & SURGICAL HISTORY:  HTN (hypertension)  Gastric ulcer  S/P tonsillectomy  S/P appendectomy      FAMILY HISTORY:  Family history of early CAD: son      Home Medications:  omeprazole 40 mg oral delayed release capsule: 1 cap(s) orally 2 times a day (30 Aug 2020 19:41)  Synthroid 50 mcg (0.05 mg) oral tablet: 1 tab(s) orally once a day (30 Aug 2020 22:06)    MEDICATIONS  (STANDING):  chlorhexidine 4% Liquid 1 Application(s) Topical <User Schedule>  heparin  Infusion 1400 Unit(s)/Hr (14 mL/Hr) IV Continuous <Continuous>  levothyroxine 50 MICROGram(s) Oral daily  pantoprazole  Injectable 40 milliGRAM(s) IV Push two times a day    MEDICATIONS  (PRN):  oxycodone    5 mG/acetaminophen 325 mG 1 Tablet(s) Oral every 6 hours PRN Severe Pain (7 - 10)      Allergies  No Known Allergies      Review of Systems:   Constitutional:  No Fever, No Chills  ENT/Mouth:  No Hearing Changes,  No Difficulty Swallowing  Eyes:  No Eye Pain, No Vision Changes  Cardiovascular:  No Chest Pain, No Palpitations  Respiratory:  No Cough, No Dyspnea  Gastrointestinal:  As described in HPI  Musculoskeletal:  No Joint Swelling, No Back Pain  Skin:  No Skin Lesions, No Jaundice  Neuro:  No Syncope, No Dizziness  Heme/Lymph:  No Bruising, No Bleeding.          Physical Examination:  T(C): 36.2 (08-31-20 @ 12:00), Max: 36.6 (08-30-20 @ 22:30)  HR: 86 (08-31-20 @ 14:30) (80 - 100)  BP: 117/67 (08-31-20 @ 14:30) (101/67 - 151/86)  RR: 21 (08-31-20 @ 14:30) (17 - 28)  SpO2: 98% (08-31-20 @ 14:30) (96% - 100%)  Height (cm): 162.6 (08-30-20 @ 22:30)  Weight (kg): 84.3 (08-30-20 @ 22:30)    08-30-20 @ 07:01  -  08-31-20 @ 07:00  --------------------------------------------------------  IN: 272 mL / OUT: 200 mL / NET: 72 mL    08-31-20 @ 07:01  -  08-31-20 @ 16:20  --------------------------------------------------------  IN: 116 mL / OUT: 75 mL / NET: 41 mL        Constitutional: No acute distress.  Eyes:. Conjunctivae are clear, Sclera is non-icteric.  Ears Nose and Throat: The external ears are normal appearing,  Oral mucosa is pink and moist.  Respiratory:  No signs of respiratory distress. Lung sounds are clear bilaterally.  Cardiovascular:  S1 S2, Regular rate and rhythm.  GI: Abdomen is soft, symmetric, and non-tender without distention. There are no visible lesions. Bowel sounds are present and normoactive in all four quadrants. No masses, hepatomegaly, or splenomegaly are noted.   Neuro: No Tremor, No involuntary movements  Skin: No rashes, No Jaundice.          Data: (reviewed by attending)                        10.5   15.30 )-----------( 263      ( 31 Aug 2020 04:24 )             31.9     Hgb Trend:  10.5  08-31-20 @ 04:24  10.9  08-30-20 @ 17:01      08-31-20 @ 07:01  -  08-31-20 @ 16:20  --------------------------------------------------------  IN: 0 mL      08-31    132<L>  |  97<L>  |  34<H>  ----------------------------<  116<H>  3.8   |  19  |  0.9    Ca    8.9      31 Aug 2020 04:24  Mg     1.9     08-31    TPro  6.2  /  Alb  3.5  /  TBili  0.7  /  DBili  x   /  AST  16  /  ALT  11  /  AlkPhos  84  08-31    Liver panel trend:  TBili 0.7   /   AST 16   /   ALT 11   /   AlkP 84   /   Tptn 6.2   /   Alb 3.5    /   DBili --      08-31  TBili 0.6   /   AST 18   /   ALT 13   /   AlkP 91   /   Tptn 6.7   /   Alb 3.7    /   DBili --      08-30      PT/INR - ( 31 Aug 2020 04:24 )   PT: 13.80 sec;   INR: 1.20 ratio         PTT - ( 31 Aug 2020 04:24 )  PTT:100.9 sec        Radiology:(reviewed by attending)

## 2020-08-31 NOTE — CONSULT NOTE ADULT - ASSESSMENT
The patient is a 64 year-old female with a PMH of HTN, Recently diagnosed PUD,  and hypothyroidism and a PSH of appendectomy and tonsillectomy presenting for shortness of breath. found to have PE s/p thrombectomy on heparin gtt. GI consulted regarding starting AC.    # Recently diagnosed The patient is a 64 year-old female with a PMH of HTN, Recently diagnosed PUD,  and hypothyroidism and a PSH of appendectomy and tonsillectomy presenting for shortness of breath. found to have PE s/p thrombectomy on heparin gtt. GI consulted regarding starting AC.    # Recently diagnosed PUD:  - No active GI bleed  - Patient on PPI BID  - started on AC due to acute PE with R heart strain s/p thrombectomy  - No abdominal pain  - Hgb and VS stable    REC:  - monitor CBC  - c/w PPI BID  - patient needs to be on Anticoagulation due to recent PE, the benefit of being on anticoagulation outweigh the risk of bleeding from PUD at this stage  - Please call GI if any signs of GI bleed  - f/u as outpatient with her GI doctor for repeat EGD for gastric ulcer

## 2020-09-01 LAB
ALBUMIN SERPL ELPH-MCNC: 3.3 G/DL — LOW (ref 3.5–5.2)
ALP SERPL-CCNC: 81 U/L — SIGNIFICANT CHANGE UP (ref 30–115)
ALT FLD-CCNC: 15 U/L — SIGNIFICANT CHANGE UP (ref 0–41)
ANION GAP SERPL CALC-SCNC: 13 MMOL/L — SIGNIFICANT CHANGE UP (ref 7–14)
APTT BLD: 46.5 SEC — HIGH (ref 27–39.2)
APTT BLD: 48.1 SEC — HIGH (ref 27–39.2)
APTT BLD: 51 SEC — HIGH (ref 27–39.2)
APTT BLD: 59.9 SEC — HIGH (ref 27–39.2)
APTT BLD: 61.8 SEC — HIGH (ref 27–39.2)
AST SERPL-CCNC: 19 U/L — SIGNIFICANT CHANGE UP (ref 0–41)
BASOPHILS # BLD AUTO: 0.02 K/UL — SIGNIFICANT CHANGE UP (ref 0–0.2)
BASOPHILS NFR BLD AUTO: 0.2 % — SIGNIFICANT CHANGE UP (ref 0–1)
BILIRUB SERPL-MCNC: 0.5 MG/DL — SIGNIFICANT CHANGE UP (ref 0.2–1.2)
BUN SERPL-MCNC: 31 MG/DL — HIGH (ref 10–20)
CALCIUM SERPL-MCNC: 8.5 MG/DL — SIGNIFICANT CHANGE UP (ref 8.5–10.1)
CHLORIDE SERPL-SCNC: 102 MMOL/L — SIGNIFICANT CHANGE UP (ref 98–110)
CO2 SERPL-SCNC: 22 MMOL/L — SIGNIFICANT CHANGE UP (ref 17–32)
CREAT SERPL-MCNC: 0.9 MG/DL — SIGNIFICANT CHANGE UP (ref 0.7–1.5)
EOSINOPHIL # BLD AUTO: 0.16 K/UL — SIGNIFICANT CHANGE UP (ref 0–0.7)
EOSINOPHIL NFR BLD AUTO: 1.3 % — SIGNIFICANT CHANGE UP (ref 0–8)
GLUCOSE SERPL-MCNC: 105 MG/DL — HIGH (ref 70–99)
HCT VFR BLD CALC: 30.7 % — LOW (ref 37–47)
HGB BLD-MCNC: 10 G/DL — LOW (ref 12–16)
IMM GRANULOCYTES NFR BLD AUTO: 0.6 % — HIGH (ref 0.1–0.3)
LYMPHOCYTES # BLD AUTO: 1.54 K/UL — SIGNIFICANT CHANGE UP (ref 1.2–3.4)
LYMPHOCYTES # BLD AUTO: 12.5 % — LOW (ref 20.5–51.1)
MCHC RBC-ENTMCNC: 29.7 PG — SIGNIFICANT CHANGE UP (ref 27–31)
MCHC RBC-ENTMCNC: 32.6 G/DL — SIGNIFICANT CHANGE UP (ref 32–37)
MCV RBC AUTO: 91.1 FL — SIGNIFICANT CHANGE UP (ref 81–99)
MONOCYTES # BLD AUTO: 1.05 K/UL — HIGH (ref 0.1–0.6)
MONOCYTES NFR BLD AUTO: 8.5 % — SIGNIFICANT CHANGE UP (ref 1.7–9.3)
NEUTROPHILS # BLD AUTO: 9.46 K/UL — HIGH (ref 1.4–6.5)
NEUTROPHILS NFR BLD AUTO: 76.9 % — HIGH (ref 42.2–75.2)
NRBC # BLD: 0 /100 WBCS — SIGNIFICANT CHANGE UP (ref 0–0)
PLATELET # BLD AUTO: 253 K/UL — SIGNIFICANT CHANGE UP (ref 130–400)
POTASSIUM SERPL-MCNC: 4.1 MMOL/L — SIGNIFICANT CHANGE UP (ref 3.5–5)
POTASSIUM SERPL-SCNC: 4.1 MMOL/L — SIGNIFICANT CHANGE UP (ref 3.5–5)
PROT SERPL-MCNC: 5.8 G/DL — LOW (ref 6–8)
RBC # BLD: 3.37 M/UL — LOW (ref 4.2–5.4)
RBC # FLD: 12.3 % — SIGNIFICANT CHANGE UP (ref 11.5–14.5)
SODIUM SERPL-SCNC: 137 MMOL/L — SIGNIFICANT CHANGE UP (ref 135–146)
WBC # BLD: 12.3 K/UL — HIGH (ref 4.8–10.8)
WBC # FLD AUTO: 12.3 K/UL — HIGH (ref 4.8–10.8)

## 2020-09-01 RX ORDER — SODIUM CHLORIDE 9 MG/ML
1000 INJECTION INTRAMUSCULAR; INTRAVENOUS; SUBCUTANEOUS
Refills: 0 | Status: DISCONTINUED | OUTPATIENT
Start: 2020-09-01 | End: 2020-09-02

## 2020-09-01 RX ADMIN — OXYCODONE AND ACETAMINOPHEN 1 TABLET(S): 5; 325 TABLET ORAL at 05:44

## 2020-09-01 RX ADMIN — OXYCODONE AND ACETAMINOPHEN 1 TABLET(S): 5; 325 TABLET ORAL at 12:45

## 2020-09-01 RX ADMIN — PANTOPRAZOLE SODIUM 40 MILLIGRAM(S): 20 TABLET, DELAYED RELEASE ORAL at 05:44

## 2020-09-01 RX ADMIN — CHLORHEXIDINE GLUCONATE 1 APPLICATION(S): 213 SOLUTION TOPICAL at 05:45

## 2020-09-01 RX ADMIN — PANTOPRAZOLE SODIUM 40 MILLIGRAM(S): 20 TABLET, DELAYED RELEASE ORAL at 17:35

## 2020-09-01 RX ADMIN — OXYCODONE AND ACETAMINOPHEN 1 TABLET(S): 5; 325 TABLET ORAL at 06:14

## 2020-09-01 RX ADMIN — Medication 50 MICROGRAM(S): at 05:44

## 2020-09-01 RX ADMIN — OXYCODONE AND ACETAMINOPHEN 1 TABLET(S): 5; 325 TABLET ORAL at 12:15

## 2020-09-01 NOTE — PROGRESS NOTE ADULT - SUBJECTIVE AND OBJECTIVE BOX
SUBJECTIVE:    Patient is a 64y old Female who presents with a chief complaint of pulmonary embolism (01 Sep 2020 08:02)    Currently admitted to medicine with the primary diagnosis of Pulmonary embolism     Today is hospital day 2d. This morning she is resting comfortably in bed and reports no new issues or overnight events.     ROS:   CONSTITUTIONAL: No weakness, fevers or chills   EYES/ENT: No visual changes; No vertigo or throat pain   NECK: No pain or stiffness   RESPIRATORY: No cough, wheezing, hemoptysis; No shortness of breath   CARDIOVASCULAR: No chest pain or palpitations   GASTROINTESTINAL: No abdominal or epigastric pain. No nausea, vomiting, or hematemesis; No diarrhea or constipation. No melena or hematochezia.  GENITOURINARY: No dysuria, frequency or hematuria  NEUROLOGICAL: No numbness or weakness  SKIN: No itching, rashes      PAST MEDICAL & SURGICAL HISTORY  HTN (hypertension)  Gastric ulcer  S/P tonsillectomy  S/P appendectomy    SOCIAL HISTORY:    ALLERGIES:  No Known Allergies    MEDICATIONS:  STANDING MEDICATIONS  chlorhexidine 4% Liquid 1 Application(s) Topical <User Schedule>  heparin  Infusion 1000 Unit(s)/Hr IV Continuous <Continuous>  levothyroxine 50 MICROGram(s) Oral daily  pantoprazole  Injectable 40 milliGRAM(s) IV Push two times a day    PRN MEDICATIONS  oxycodone    5 mG/acetaminophen 325 mG 1 Tablet(s) Oral every 6 hours PRN    VITALS:   T(F): 98  HR: 92  BP: 125/76  RR: 22  SpO2: 96%    LABS:  Negative for smoking/alcohol/drug use.                         10.0   12.30 )-----------( 253      ( 01 Sep 2020 05:35 )             30.7     09-01    137  |  102  |  31<H>  ----------------------------<  105<H>  4.1   |  22  |  0.9    Ca    8.5      01 Sep 2020 05:35  Mg     1.9     08-31    TPro  5.8<L>  /  Alb  3.3<L>  /  TBili  0.5  /  DBili  x   /  AST  19  /  ALT  15  /  AlkPhos  81  09-01    PT/INR - ( 31 Aug 2020 04:24 )   PT: 13.80 sec;   INR: 1.20 ratio         PTT - ( 01 Sep 2020 05:35 )  PTT:46.5 sec          CARDIAC MARKERS ( 31 Aug 2020 04:24 )  x     / 0.08 ng/mL / x     / x     / x      CARDIAC MARKERS ( 30 Aug 2020 17:01 )  x     / 0.10 ng/mL / x     / x     / x          RADIOLOGY:    PHYSICAL EXAM:  GEN: No acute distress  HEENT: normocephalic, atraumatic, aniceteric  LUNGS: decreased breath sounds bilaterally, no rales/wheezing/ rhonchi  HEART: S1/S2 present. RRR, no murmurs  ABD: Soft, non-tender, non-distended. Bowel sounds present  EXT: NC/NC/NE/2+PP/FRANCES  NEURO: AAOX3, normal affect      ASSESSMENT AND PLAN:    The patient is a 64 year-old female with a PMH of HTN and hypothyroidism and a PSH of appendectomy and tonsillectomy presenting for shortness of breath, admitted for pulmonary embolism.    # Shortness of breath secondary to submassive acute pulmonary embolism  s/p thrombectomy today w/ IR  Trops 0.10 on presentation  CT angio showed b/l central PE extending to subsegmental branches w/ evidence of R heart strain  C/w heparin gtt; serial aPTTs  f/u LE duplex   TTE: mild pulmonary HTN      # Gastric ulcer at pylorus  No evidence of active bleed  C/w Protonix gtt for now , call GI if there is active bleed/ drop in Hb  F/u AM CBC    # H/o hypertension  Patient reports was prescribed lisinopril but stopped taking it several weeks ago as BP normalized  Vitals per routine  Restart agent as indicated    # CHG  # DVT ppx- heparin gtt  # GI ppx- pantoprazole bid  # Diet- advance as tolerated   # Activity- bedrest for now  # Dispo- acute SUBJECTIVE:    Patient is a 64y old Female who presents with a chief complaint of pulmonary embolism (01 Sep 2020 08:02)    Currently admitted to medicine with the primary diagnosis of Pulmonary embolism     Today is hospital day 2d. This morning she is resting comfortably in bed and reports no new issues or overnight events.     ROS:   CONSTITUTIONAL: No weakness, fevers or chills   EYES/ENT: No visual changes; No vertigo or throat pain   NECK: No pain or stiffness   RESPIRATORY: No cough, wheezing, hemoptysis; No shortness of breath   CARDIOVASCULAR: No chest pain or palpitations   GASTROINTESTINAL: No abdominal or epigastric pain. No nausea, vomiting, or hematemesis; No diarrhea or constipation. No melena or hematochezia.  GENITOURINARY: No dysuria, frequency or hematuria  NEUROLOGICAL: No numbness or weakness  SKIN: No itching, rashes      PAST MEDICAL & SURGICAL HISTORY  HTN (hypertension)  Gastric ulcer  S/P tonsillectomy  S/P appendectomy    SOCIAL HISTORY:    ALLERGIES:  No Known Allergies    MEDICATIONS:  STANDING MEDICATIONS  chlorhexidine 4% Liquid 1 Application(s) Topical <User Schedule>  heparin  Infusion 1000 Unit(s)/Hr IV Continuous <Continuous>  levothyroxine 50 MICROGram(s) Oral daily  pantoprazole  Injectable 40 milliGRAM(s) IV Push two times a day    PRN MEDICATIONS  oxycodone    5 mG/acetaminophen 325 mG 1 Tablet(s) Oral every 6 hours PRN    VITALS:   T(F): 98  HR: 92  BP: 125/76  RR: 22  SpO2: 96%    LABS:  Negative for smoking/alcohol/drug use.                         10.0   12.30 )-----------( 253      ( 01 Sep 2020 05:35 )             30.7     09-01    137  |  102  |  31<H>  ----------------------------<  105<H>  4.1   |  22  |  0.9    Ca    8.5      01 Sep 2020 05:35  Mg     1.9     08-31    TPro  5.8<L>  /  Alb  3.3<L>  /  TBili  0.5  /  DBili  x   /  AST  19  /  ALT  15  /  AlkPhos  81  09-01    PT/INR - ( 31 Aug 2020 04:24 )   PT: 13.80 sec;   INR: 1.20 ratio         PTT - ( 01 Sep 2020 05:35 )  PTT:46.5 sec          CARDIAC MARKERS ( 31 Aug 2020 04:24 )  x     / 0.08 ng/mL / x     / x     / x      CARDIAC MARKERS ( 30 Aug 2020 17:01 )  x     / 0.10 ng/mL / x     / x     / x          RADIOLOGY:    PHYSICAL EXAM:  GEN: No acute distress  HEENT: normocephalic, atraumatic, aniceteric  LUNGS: decreased breath sounds bilaterally, no rales/wheezing/ rhonchi  HEART: S1/S2 present. RRR, no murmurs  ABD: Soft, non-tender, non-distended. Bowel sounds present  EXT: NC/NC/NE/2+PP/FRANCES  NEURO: AAOX3, normal affect      ASSESSMENT AND PLAN:    The patient is a 64 year-old female with a PMH of HTN and hypothyroidism and a PSH of appendectomy and tonsillectomy presenting for shortness of breath, admitted for pulmonary embolism.    # Shortness of breath secondary to submassive acute pulmonary embolism  s/p thrombectomy today w/ IR  Trops 0.10 on presentation  CT angio showed b/l central PE extending to subsegmental branches w/ evidence of R heart strain  C/w heparin gtt; serial aPTTs  f/u LE duplex   TTE: mild pulmonary HTN      # Gastric ulcer at pylorus  No evidence of active bleed  C/w Protonix 40 mg bid , call GI if there is active bleed/ drop in Hb  F/u CBC  GI following    # H/o hypertension  Patient reports was prescribed lisinopril but stopped taking it several weeks ago as BP normalized  Vitals per routine  Restart agent as indicated    # CHG  # DVT ppx- heparin gtt  # GI ppx- pantoprazole bid  # Diet- advance as tolerated   # Activity- bedrest for now  # Dispo- acute SUBJECTIVE:    Patient is a 64y old Female who presents with a chief complaint of pulmonary embolism (01 Sep 2020 08:02)    Currently admitted to medicine with the primary diagnosis of Pulmonary embolism     Today is hospital day 2d. This morning she is resting comfortably in bed and reports no new issues or overnight events.     ROS:   CONSTITUTIONAL: No weakness, fevers or chills   EYES/ENT: No visual changes; No vertigo or throat pain   NECK: No pain or stiffness   RESPIRATORY: No cough, wheezing, hemoptysis; No shortness of breath   CARDIOVASCULAR: No chest pain or palpitations   GASTROINTESTINAL: No abdominal or epigastric pain. No nausea, vomiting, or hematemesis; No diarrhea or constipation. No melena or hematochezia.  GENITOURINARY: No dysuria, frequency or hematuria  NEUROLOGICAL: No numbness or weakness  SKIN: No itching, rashes      PAST MEDICAL & SURGICAL HISTORY  HTN (hypertension)  Gastric ulcer  S/P tonsillectomy  S/P appendectomy      ALLERGIES:  No Known Allergies    MEDICATIONS:  STANDING MEDICATIONS  chlorhexidine 4% Liquid 1 Application(s) Topical <User Schedule>  heparin  Infusion 1000 Unit(s)/Hr IV Continuous <Continuous>  levothyroxine 50 MICROGram(s) Oral daily  pantoprazole  Injectable 40 milliGRAM(s) IV Push two times a day    PRN MEDICATIONS  oxycodone    5 mG/acetaminophen 325 mG 1 Tablet(s) Oral every 6 hours PRN    VITALS:   T(F): 98  HR: 92  BP: 125/76  RR: 22  SpO2: 96%    LABS:  Negative for smoking/alcohol/drug use.                         10.0   12.30 )-----------( 253      ( 01 Sep 2020 05:35 )             30.7     09-01    137  |  102  |  31<H>  ----------------------------<  105<H>  4.1   |  22  |  0.9    Ca    8.5      01 Sep 2020 05:35  Mg     1.9     08-31    TPro  5.8<L>  /  Alb  3.3<L>  /  TBili  0.5  /  DBili  x   /  AST  19  /  ALT  15  /  AlkPhos  81  09-01    PT/INR - ( 31 Aug 2020 04:24 )   PT: 13.80 sec;   INR: 1.20 ratio         PTT - ( 01 Sep 2020 05:35 )  PTT:46.5 sec          CARDIAC MARKERS ( 31 Aug 2020 04:24 )  x     / 0.08 ng/mL / x     / x     / x      CARDIAC MARKERS ( 30 Aug 2020 17:01 )  x     / 0.10 ng/mL / x     / x     / x          RADIOLOGY:  no new today  PHYSICAL EXAM:  GEN: No acute distress  HEENT: normocephalic, atraumatic, aniceteric  LUNGS: decreased breath sounds bilaterally, no rales/wheezing/ rhonchi  HEART: S1/S2 present. RRR, no murmurs  ABD: Soft, non-tender, non-distended. Bowel sounds present  EXT: NC/NC/NE/2+PP/FRANCES  NEURO: AAOX3, normal affect      ASSESSMENT AND PLAN:    The patient is a 64 year-old female with a PMH of HTN and hypothyroidism and a PSH of appendectomy and tonsillectomy presenting for shortness of breath, admitted for pulmonary embolism.    # Shortness of breath secondary to submassive acute pulmonary embolism  s/p thrombectomy w/ IR  Trops 0.10 on presentation  CT angio showed b/l central PE extending to subsegmental branches w/ evidence of R heart strain  C/w heparin gtt; serial aPTTs  f/u LE duplex   TTE: mild pulmonary HTN      # Gastric ulcer at pylorus  No evidence of active bleed  C/w Protonix 40 mg bid , call GI if there is active bleed/ drop in Hb  F/u CBC  GI following    # H/o hypertension  Patient reports was prescribed lisinopril but stopped taking it several weeks ago as BP normalized  Vitals per routine  Restart agent as indicated    # CHG  # DVT ppx- heparin gtt  # GI ppx- pantoprazole bid  # Diet- advance as tolerated   # Activity- bedrest for now  # Dispo- acute SUBJECTIVE:    Patient is a 64y old Female who presents with a chief complaint of pulmonary embolism (01 Sep 2020 08:02)    Currently admitted to medicine with the primary diagnosis of Pulmonary embolism     Today is hospital day 2d. This morning she is resting comfortably in bed and reports no new issues or overnight events.     ROS:   CONSTITUTIONAL: No weakness, fevers or chills   EYES/ENT: No visual changes; No vertigo or throat pain   NECK: No pain or stiffness   RESPIRATORY: No cough, wheezing, hemoptysis; No shortness of breath   CARDIOVASCULAR: No chest pain or palpitations   GASTROINTESTINAL: No abdominal or epigastric pain. No nausea, vomiting, or hematemesis; No diarrhea or constipation. No melena or hematochezia.  GENITOURINARY: No dysuria, frequency or hematuria  NEUROLOGICAL: No numbness or weakness  SKIN: No itching, rashes      PAST MEDICAL & SURGICAL HISTORY  HTN (hypertension)  Gastric ulcer  S/P tonsillectomy  S/P appendectomy      ALLERGIES:  No Known Allergies    MEDICATIONS:  STANDING MEDICATIONS  chlorhexidine 4% Liquid 1 Application(s) Topical <User Schedule>  heparin  Infusion 1000 Unit(s)/Hr IV Continuous <Continuous>  levothyroxine 50 MICROGram(s) Oral daily  pantoprazole  Injectable 40 milliGRAM(s) IV Push two times a day    PRN MEDICATIONS  oxycodone    5 mG/acetaminophen 325 mG 1 Tablet(s) Oral every 6 hours PRN    VITALS:   T(F): 98  HR: 92  BP: 125/76  RR: 22  SpO2: 96%    LABS:  Negative for smoking/alcohol/drug use.                         10.0   12.30 )-----------( 253      ( 01 Sep 2020 05:35 )             30.7     09-01    137  |  102  |  31<H>  ----------------------------<  105<H>  4.1   |  22  |  0.9    Ca    8.5      01 Sep 2020 05:35  Mg     1.9     08-31    TPro  5.8<L>  /  Alb  3.3<L>  /  TBili  0.5  /  DBili  x   /  AST  19  /  ALT  15  /  AlkPhos  81  09-01    PT/INR - ( 31 Aug 2020 04:24 )   PT: 13.80 sec;   INR: 1.20 ratio         PTT - ( 01 Sep 2020 05:35 )  PTT:46.5 sec          CARDIAC MARKERS ( 31 Aug 2020 04:24 )  x     / 0.08 ng/mL / x     / x     / x      CARDIAC MARKERS ( 30 Aug 2020 17:01 )  x     / 0.10 ng/mL / x     / x     / x          RADIOLOGY:  no new today  PHYSICAL EXAM:  GEN: No acute distress  HEENT: normocephalic, atraumatic, aniceteric  LUNGS: decreased breath sounds bilaterally, no rales/wheezing/ rhonchi  HEART: S1/S2 present. RRR, no murmurs  ABD: Soft, non-tender, non-distended. Bowel sounds present  EXT: NC/NC/NE/2+PP/FRANCES  NEURO: AAOX3, normal affect      ASSESSMENT AND PLAN:    The patient is a 64 year-old female with a PMH of HTN and hypothyroidism and a PSH of appendectomy and tonsillectomy presenting for shortness of breath, admitted for pulmonary embolism.    # Shortness of breath secondary to submassive acute pulmonary embolism  s/p thrombectomy w/ IR  Trops 0.10 on presentation  CT angio showed b/l central PE extending to subsegmental branches w/ evidence of R heart strain  C/w heparin gtt; serial aPTTs  f/u LE duplex   TTE: mild pulmonary HTN      # Gastric ulcer at pylorus  No evidence of active bleed  C/w Protonix 40 mg bid , call GI if there is active bleed/ drop in Hb  F/u CBC  GI following    # H/o hypertension  Patient reports was prescribed lisinopril but stopped taking it several weeks ago as BP normalized  Vitals per routine  Restart agent as indicated    # ? Soft tissue mass   - r/o malignancy   - decreased functional status and ability to ambulate  - denies bladder or bowel incontinence , denies focal deficits  - CT Scan and MRI of L/S in chart . Soft tissue mass  - MRI L hip and Ortho consult     # CHG  # DVT ppx- heparin gtt  # GI ppx- pantoprazole bid  # Diet- advance as tolerated   # Activity- bedrest for now  # Dispo- acute     CALL FAMILY MEMBER Dr. Terrazas ([facility]) with updates daily   Updated family member in person today at 1 pm SUBJECTIVE:    Patient is a 64y old Female who presents with a chief complaint of pulmonary embolism (01 Sep 2020 08:02)    Currently admitted to medicine with the primary diagnosis of Pulmonary embolism     Today is hospital day 2d. This morning she is resting comfortably in bed and reports no new issues or overnight events.     ROS:   CONSTITUTIONAL: No weakness, fevers or chills   EYES/ENT: No visual changes; No vertigo or throat pain   NECK: No pain or stiffness   RESPIRATORY: No cough, wheezing, hemoptysis; No shortness of breath   CARDIOVASCULAR: No chest pain or palpitations   GASTROINTESTINAL: No abdominal or epigastric pain. No nausea, vomiting, or hematemesis; No diarrhea or constipation. No melena or hematochezia.  GENITOURINARY: No dysuria, frequency or hematuria  NEUROLOGICAL: No numbness or weakness  SKIN: No itching, rashes      PAST MEDICAL & SURGICAL HISTORY  HTN (hypertension)  Gastric ulcer  S/P tonsillectomy  S/P appendectomy      ALLERGIES:  No Known Allergies    MEDICATIONS:  STANDING MEDICATIONS  chlorhexidine 4% Liquid 1 Application(s) Topical <User Schedule>  heparin  Infusion 1000 Unit(s)/Hr IV Continuous <Continuous>  levothyroxine 50 MICROGram(s) Oral daily  pantoprazole  Injectable 40 milliGRAM(s) IV Push two times a day    PRN MEDICATIONS  oxycodone    5 mG/acetaminophen 325 mG 1 Tablet(s) Oral every 6 hours PRN    VITALS:   T(F): 98  HR: 92  BP: 125/76  RR: 22  SpO2: 96%    LABS:  Negative for smoking/alcohol/drug use.                         10.0   12.30 )-----------( 253      ( 01 Sep 2020 05:35 )             30.7     09-01    137  |  102  |  31<H>  ----------------------------<  105<H>  4.1   |  22  |  0.9    Ca    8.5      01 Sep 2020 05:35  Mg     1.9     08-31    TPro  5.8<L>  /  Alb  3.3<L>  /  TBili  0.5  /  DBili  x   /  AST  19  /  ALT  15  /  AlkPhos  81  09-01    PT/INR - ( 31 Aug 2020 04:24 )   PT: 13.80 sec;   INR: 1.20 ratio         PTT - ( 01 Sep 2020 05:35 )  PTT:46.5 sec          CARDIAC MARKERS ( 31 Aug 2020 04:24 )  x     / 0.08 ng/mL / x     / x     / x      CARDIAC MARKERS ( 30 Aug 2020 17:01 )  x     / 0.10 ng/mL / x     / x     / x          RADIOLOGY:  no new today  PHYSICAL EXAM:  GEN: No acute distress  HEENT: normocephalic, atraumatic, aniceteric  LUNGS: decreased breath sounds bilaterally, no rales/wheezing/ rhonchi  HEART: S1/S2 present. RRR, no murmurs  ABD: Soft, non-tender, non-distended. Bowel sounds present  EXT: NC/NC/NE/2+PP/FRANCES  NEURO: AAOX3, normal affect      ASSESSMENT AND PLAN:    The patient is a 64 year-old female with a PMH of HTN and hypothyroidism and a PSH of appendectomy and tonsillectomy presenting for shortness of breath, admitted for pulmonary embolism.    # Shortness of breath secondary to submassive acute pulmonary embolism  s/p thrombectomy w/ IR  Trops 0.10 on presentation  CT angio showed b/l central PE extending to subsegmental branches w/ evidence of R heart strain  C/w heparin gtt; serial aPTTs  f/u LE duplex   TTE: mild pulmonary HTN      # Gastric ulcer at pylorus  No evidence of active bleed  C/w Protonix 40 mg bid , call GI if there is active bleed/ drop in Hb  F/u CBC  GI following    # H/o hypertension  Patient reports was prescribed lisinopril but stopped taking it several weeks ago as BP normalized  Vitals per routine  Restart agent as indicated    # ? Soft tissue mass   - r/o malignancy   - decreased functional status and ability to ambulate  - denies bladder or bowel incontinence , denies focal deficits  - CT Scan and MRI of L/S in chart . Soft tissue mass  - MRI L hip and Ortho consult     # CHG  # DVT ppx- heparin gtt  # GI ppx- pantoprazole bid  # Diet- advance as tolerated   # Activity- bedrest for now  # Dispo- acute     CALL FAMILY MEMBER Dr. Terrazas ([facility]) with updates daily [phone]  Updated family member in person today at 1 pm

## 2020-09-01 NOTE — PROGRESS NOTE ADULT - ASSESSMENT
IMPRESSION:    high risk submassive PE/ s/p thrombectomy/ IV heparin  PUD      PLAN:    CNS: DAILY SEDATION VACATION    HEENT:  Oral care    PULMONARY:  HOB @ 45 degrees, KEEP SAO2 92 TO 96%    CARDIOVASCULAR: IV heparin    GI: GI prophylaxis                                          Feeding po    RENAL:  F/u  lytes.  Correct as needed. accurate I/O    HEMATOLOGICAL:  DVT prophylaxis. iv HEPARIN    ENDOCRINE:  Follow up FS.  Insulin protocol if needed.      transfer to tele IMPRESSION:    high risk submassive PE/ s/p thrombectomy/ IV heparin  PUD  Left Hip soft tissue mass? R/O Malignancy?      PLAN:    CNS: DAILY SEDATION VACATION    HEENT:  Oral care    PULMONARY:  HOB @ 45 degrees, KEEP SAO2 92 TO 96%    CARDIOVASCULAR: IV heparin    GI: GI prophylaxis                                          Feeding po    RENAL:  F/u  lytes.  Correct as needed. accurate I/O    HEMATOLOGICAL:  DVT prophylaxis. iv HEPARIN    ENDOCRINE:  Follow up FS.  Insulin protocol if needed.    MSK : Ortho consult and MRI left hip if okay with ortho.       transfer to tele IMPRESSION:    high risk submassive PE/ s/p thrombectomy/ IV heparin  PUD  Left Hip soft tissue mass? R/O Malignancy?(Outpatient CT and MRI results in chart)      PLAN:    CNS: DAILY SEDATION VACATION    HEENT:  Oral care    PULMONARY:  HOB @ 45 degrees, KEEP SAO2 92 TO 96%    CARDIOVASCULAR: IV heparin    GI: GI prophylaxis                                          Feeding po    RENAL:  F/u  lytes.  Correct as needed. accurate I/O    HEMATOLOGICAL:  DVT prophylaxis. iv HEPARIN    ENDOCRINE:  Follow up FS.  Insulin protocol if needed.    MSK : Ortho consult and MRI left hip if okay with ortho.       transfer to tele

## 2020-09-01 NOTE — CHART NOTE - NSCHARTNOTEFT_GEN_A_CORE
64 year-old female with a PMH of PUD, HTN and hypothyroidism and a PSH of appendectomy and tonsillectomy presenting for shortness of breath, weakness and dizziness. The patient was found to have submassive pulmonary embolism and underwent thrombectomy on 8/31/20 with IR. The patient was anticoagulated with Heparin ggt. She was followed by GI for PUD and was given protonix ggt and now PPI PO BID. The patient was hemodynamically stable in MICU. She was stable for downgrade.    # Shortness of breath secondary to submassive acute pulmonary embolism  s/p thrombectomy 8/31 w/ IR  Trops 0.10 on presentation  CT angio showed b/l central PE extending to subsegmental branches w/ evidence of R heart strain  C/w heparin gtt; serial aPTTs  f/u LE duplex   TTE: mild pulmonary HTN      # Gastric ulcer at pylorus  No evidence of active bleed  C/w Protonix 40 mg bid , call GI if there is active bleed/ drop in Hb  F/u CBC  GI following    # H/o hypertension  Patient reports was prescribed lisinopril but stopped taking it several weeks ago as BP normalized  Vitals per routine  Restart agent as indicated    # CHG  # DVT ppx- heparin gtt  # GI ppx- pantoprazole bid  # Diet- advance as tolerated   # Activity- bedrest for now  # Dispo- acute     FOLLOW UP:  - monitor CBC  - monitor PTT , on heparin ggt 64 year-old female with a PMH of PUD, HTN and hypothyroidism and a PSH of appendectomy and tonsillectomy presenting for shortness of breath, weakness and dizziness. The patient was found to have submassive pulmonary embolism and underwent thrombectomy on 8/31/20 with IR. The patient was anticoagulated with Heparin ggt. She was followed by GI for PUD and was given protonix ggt and now PPI PO BID. The patient was hemodynamically stable in MICU. She was stable for downgrade.    # Shortness of breath secondary to submassive acute pulmonary embolism  s/p thrombectomy 8/31 w/ IR  Trops 0.10 on presentation  CT angio showed b/l central PE extending to subsegmental branches w/ evidence of R heart strain  C/w heparin gtt; serial aPTTs  f/u LE duplex   TTE: mild pulmonary HTN      # Gastric ulcer at pylorus  No evidence of active bleed  C/w Protonix 40 mg bid , call GI if there is active bleed/ drop in Hb  F/u CBC  GI following    # H/o hypertension  Patient reports was prescribed lisinopril but stopped taking it several weeks ago as BP normalized  Vitals per routine  Restart agent as indicated    # ? Soft tissue mass     # CHG  # DVT ppx- heparin gtt  # GI ppx- pantoprazole bid  # Diet- advance as tolerated   # Activity- bedrest for now  # Dispo- acute     FOLLOW UP:  - monitor CBC  - monitor PTT , on heparin ggt 64 year-old female with a PMH of PUD, HTN and hypothyroidism and a PSH of appendectomy and tonsillectomy presenting for shortness of breath, weakness and dizziness. The patient was found to have submassive pulmonary embolism and underwent thrombectomy on 8/31/20 with IR. The patient was anticoagulated with Heparin ggt. She was followed by GI for PUD and was given protonix ggt and now PPI PO BID. The patient was hemodynamically stable in MICU. She was stable for downgrade.    # Shortness of breath secondary to submassive acute pulmonary embolism  s/p thrombectomy 8/31 w/ IR  Trops 0.10 on presentation  CT angio showed b/l central PE extending to subsegmental branches w/ evidence of R heart strain  C/w heparin gtt; serial aPTTs  f/u LE duplex   TTE: mild pulmonary HTN      # Gastric ulcer at pylorus  No evidence of active bleed  C/w Protonix 40 mg bid , call GI if there is active bleed/ drop in Hb  F/u CBC  GI following    # H/o hypertension  Patient reports was prescribed lisinopril but stopped taking it several weeks ago as BP normalized  Vitals per routine  Restart agent as indicated    # ? Soft tissue mass     # CHG  # DVT ppx- heparin gtt  # GI ppx- pantoprazole bid  # Diet- advance as tolerated   # Activity- bedrest for now  # Dispo- acute     FOLLOW UP:  - monitor CBC  - monitor PTT , on heparin ggt      CALL FAMILY MEMBER Dr. Terrazas ([facility]) with updates daily : [phone]  Updated family member in person today at 1 pm 64 year-old female with a PMH of PUD, HTN and hypothyroidism and a PSH of appendectomy and tonsillectomy presenting for shortness of breath, weakness and dizziness. The patient was found to have submassive pulmonary embolism and underwent thrombectomy on 8/31/20 with IR. The patient was anticoagulated with Heparin ggt. She was followed by GI for PUD and was given protonix ggt and now PPI PO BID. The patient was hemodynamically stable in MICU. She was stable for downgrade.    # Shortness of breath secondary to submassive acute pulmonary embolism  s/p thrombectomy 8/31 w/ IR  Trops 0.10 on presentation  CT angio showed b/l central PE extending to subsegmental branches w/ evidence of R heart strain  C/w heparin gtt; serial aPTTs  f/u LE duplex   TTE: mild pulmonary HTN      # Gastric ulcer at pylorus  No evidence of active bleed  C/w Protonix 40 mg bid , call GI if there is active bleed/ drop in Hb  F/u CBC  GI following    # H/o hypertension  Patient reports was prescribed lisinopril but stopped taking it several weeks ago as BP normalized  Vitals per routine  Restart agent as indicated    # ? Soft tissue mass   - r/o malignancy   - decreased functional status and ability to ambulate  - denies bladder or bowel incontinence , denies focal deficits  - CT Scan and MRI of L/S in chart . Soft tissue mass  - MRI L hip and Ortho consult     # CHG  # DVT ppx- heparin gtt  # GI ppx- pantoprazole bid  # Diet- advance as tolerated   # Activity- bedrest for now  # Dispo- acute     FOLLOW UP:  - monitor CBC  - monitor PTT , on heparin ggt      CALL FAMILY MEMBER Dr. Terrazas ([facility]) with updates daily : [phone]  Updated family member in person today at 1 pm 64 year-old female with a PMH of PUD, HTN and hypothyroidism and a PSH of appendectomy and tonsillectomy presenting for shortness of breath, weakness and dizziness. The patient was found to have submassive pulmonary embolism and underwent thrombectomy on 8/31/20 with IR. The patient was anticoagulated with Heparin ggt. She was followed by GI for PUD and was given protonix ggt and now PPI PO BID. The patient was hemodynamically stable in MICU. She was stable for downgrade.    # Shortness of breath secondary to submassive acute pulmonary embolism  s/p thrombectomy 8/31 w/ IR  Trops 0.10 on presentation  CT angio showed b/l central PE extending to subsegmental branches w/ evidence of R heart strain  C/w heparin gtt; serial aPTTs  f/u LE duplex   TTE: mild pulmonary HTN      # Gastric ulcer at pylorus  No evidence of active bleed  C/w Protonix 40 mg bid , call GI if there is active bleed/ drop in Hb  F/u CBC  GI following    # H/o hypertension  Patient reports was prescribed lisinopril but stopped taking it several weeks ago as BP normalized  Vitals per routine  Restart agent as indicated    # ? Soft tissue mass   - r/o malignancy   - decreased functional status and ability to ambulate  - denies bladder or bowel incontinence , denies focal deficits  - CT Scan and MRI of L/S in chart . Soft tissue mass  - MRI L hip and Ortho consult     # CHG  # DVT ppx- heparin gtt  # GI ppx- pantoprazole bid  # Diet- advance as tolerated   # Activity- bedrest for now  # Dispo- acute     FOLLOW UP:  - monitor CBC  - monitor PTT , on heparin ggt      CALL FAMILY MEMBER Dr. Terrazas ([facility]) with updates daily : [phone]  Updated family member in person today at 1 pm    SIGNOUT GIVEN TO DR. REZA AT 3:20 ON 9/1/20

## 2020-09-01 NOTE — PROGRESS NOTE ADULT - SUBJECTIVE AND OBJECTIVE BOX
OVERNIGHT EVENTS: events noted, s/p thrombectomy, on RA, IV heparin    Vital Signs Last 24 Hrs  T(C): 36.7 (01 Sep 2020 08:00), Max: 37.9 (31 Aug 2020 20:00)  T(F): 98 (01 Sep 2020 08:00), Max: 100.3 (31 Aug 2020 20:00)  HR: 86 (01 Sep 2020 08:00) (84 - 98)  BP: 126/75 (01 Sep 2020 07:00) (101/67 - 140/73)  BP(mean): 90 (01 Sep 2020 07:00) (74 - 112)  RR: 20 (01 Sep 2020 08:00) (19 - 35)  SpO2: 94% (01 Sep 2020 08:00) (94% - 100%)    PHYSICAL EXAMINATION:    GENERAL: awake, follows comamnds    HEENT: Head is normocephalic and atraumatic. Extraocular muscles are intact. Mucous membranes are moist.    NECK: Supple.    LUNGS: dec bs both abses    HEART: Regular rate and rhythm without murmur.    ABDOMEN: Soft, nontender, and nondistended.      EXTREMITIES: Without any cyanosis, clubbing, rash, lesions or edema.    NEUROLOGIC: Grossly intact.    SKIN: No ulceration or induration present.      LABS:                        10.0   12.30 )-----------( 253      ( 01 Sep 2020 05:35 )             30.7     09-01    137  |  102  |  31<H>  ----------------------------<  105<H>  4.1   |  22  |  0.9    Ca    8.5      01 Sep 2020 05:35  Mg     1.9     08-31    TPro  5.8<L>  /  Alb  3.3<L>  /  TBili  0.5  /  DBili  x   /  AST  19  /  ALT  15  /  AlkPhos  81  09-01    PT/INR - ( 31 Aug 2020 04:24 )   PT: 13.80 sec;   INR: 1.20 ratio         PTT - ( 01 Sep 2020 05:35 )  PTT:46.5 sec      CARDIAC MARKERS ( 31 Aug 2020 04:24 )  x     / 0.08 ng/mL / x     / x     / x      CARDIAC MARKERS ( 30 Aug 2020 17:01 )  x     / 0.10 ng/mL / x     / x     / x            Serum Pro-Brain Natriuretic Peptide: 6178 pg/mL (08-30-20 @ 17:01)            08-31-20 @ 07:01  -  09-01-20 @ 07:00  --------------------------------------------------------  IN: 778 mL / OUT: 425 mL / NET: 353 mL        MICROBIOLOGY:      MEDICATIONS  (STANDING):  chlorhexidine 4% Liquid 1 Application(s) Topical <User Schedule>  heparin  Infusion 1000 Unit(s)/Hr (10 mL/Hr) IV Continuous <Continuous>  levothyroxine 50 MICROGram(s) Oral daily  pantoprazole  Injectable 40 milliGRAM(s) IV Push two times a day    MEDICATIONS  (PRN):  oxycodone    5 mG/acetaminophen 325 mG 1 Tablet(s) Oral every 6 hours PRN Severe Pain (7 - 10)      RADIOLOGY & ADDITIONAL STUDIES:

## 2020-09-02 LAB
ALBUMIN SERPL ELPH-MCNC: 2.9 G/DL — LOW (ref 3.5–5.2)
ALP SERPL-CCNC: 75 U/L — SIGNIFICANT CHANGE UP (ref 30–115)
ALT FLD-CCNC: 16 U/L — SIGNIFICANT CHANGE UP (ref 0–41)
ANION GAP SERPL CALC-SCNC: 13 MMOL/L — SIGNIFICANT CHANGE UP (ref 7–14)
APTT BLD: 119.4 SEC — CRITICAL HIGH (ref 27–39.2)
APTT BLD: 59 SEC — HIGH (ref 27–39.2)
APTT BLD: 59.7 SEC — HIGH (ref 27–39.2)
AST SERPL-CCNC: 20 U/L — SIGNIFICANT CHANGE UP (ref 0–41)
BASOPHILS # BLD AUTO: 0.03 K/UL — SIGNIFICANT CHANGE UP (ref 0–0.2)
BASOPHILS NFR BLD AUTO: 0.3 % — SIGNIFICANT CHANGE UP (ref 0–1)
BILIRUB SERPL-MCNC: 0.3 MG/DL — SIGNIFICANT CHANGE UP (ref 0.2–1.2)
BLD GP AB SCN SERPL QL: SIGNIFICANT CHANGE UP
BUN SERPL-MCNC: 26 MG/DL — HIGH (ref 10–20)
CALCIUM SERPL-MCNC: 8.1 MG/DL — LOW (ref 8.5–10.1)
CHLORIDE SERPL-SCNC: 104 MMOL/L — SIGNIFICANT CHANGE UP (ref 98–110)
CO2 SERPL-SCNC: 20 MMOL/L — SIGNIFICANT CHANGE UP (ref 17–32)
CREAT SERPL-MCNC: 0.8 MG/DL — SIGNIFICANT CHANGE UP (ref 0.7–1.5)
EOSINOPHIL # BLD AUTO: 0.14 K/UL — SIGNIFICANT CHANGE UP (ref 0–0.7)
EOSINOPHIL NFR BLD AUTO: 1.2 % — SIGNIFICANT CHANGE UP (ref 0–8)
GLUCOSE SERPL-MCNC: 111 MG/DL — HIGH (ref 70–99)
HCT VFR BLD CALC: 26 % — LOW (ref 37–47)
HGB BLD-MCNC: 8.4 G/DL — LOW (ref 12–16)
IMM GRANULOCYTES NFR BLD AUTO: 0.4 % — HIGH (ref 0.1–0.3)
IRON SATN MFR SERPL: 15 UG/DL — LOW (ref 35–150)
IRON SATN MFR SERPL: 7 % — LOW (ref 15–50)
LDH SERPL L TO P-CCNC: 240 — SIGNIFICANT CHANGE UP (ref 50–242)
LYMPHOCYTES # BLD AUTO: 1.74 K/UL — SIGNIFICANT CHANGE UP (ref 1.2–3.4)
LYMPHOCYTES # BLD AUTO: 15.5 % — LOW (ref 20.5–51.1)
MAGNESIUM SERPL-MCNC: 1.7 MG/DL — LOW (ref 1.8–2.4)
MCHC RBC-ENTMCNC: 29.8 PG — SIGNIFICANT CHANGE UP (ref 27–31)
MCHC RBC-ENTMCNC: 32.3 G/DL — SIGNIFICANT CHANGE UP (ref 32–37)
MCV RBC AUTO: 92.2 FL — SIGNIFICANT CHANGE UP (ref 81–99)
MONOCYTES # BLD AUTO: 0.88 K/UL — HIGH (ref 0.1–0.6)
MONOCYTES NFR BLD AUTO: 7.8 % — SIGNIFICANT CHANGE UP (ref 1.7–9.3)
NEUTROPHILS # BLD AUTO: 8.38 K/UL — HIGH (ref 1.4–6.5)
NEUTROPHILS NFR BLD AUTO: 74.8 % — SIGNIFICANT CHANGE UP (ref 42.2–75.2)
NRBC # BLD: 0 /100 WBCS — SIGNIFICANT CHANGE UP (ref 0–0)
PLATELET # BLD AUTO: 251 K/UL — SIGNIFICANT CHANGE UP (ref 130–400)
POTASSIUM SERPL-MCNC: 3.7 MMOL/L — SIGNIFICANT CHANGE UP (ref 3.5–5)
POTASSIUM SERPL-SCNC: 3.7 MMOL/L — SIGNIFICANT CHANGE UP (ref 3.5–5)
PROT SERPL-MCNC: 5.2 G/DL — LOW (ref 6–8)
RBC # BLD: 2.82 M/UL — LOW (ref 4.2–5.4)
RBC # BLD: 3.07 M/UL — LOW (ref 4.2–5.4)
RBC # FLD: 12.5 % — SIGNIFICANT CHANGE UP (ref 11.5–14.5)
RETICS #: 93.9 K/UL — SIGNIFICANT CHANGE UP (ref 25–125)
RETICS/RBC NFR: 3.1 % — HIGH (ref 0.5–1.5)
SODIUM SERPL-SCNC: 137 MMOL/L — SIGNIFICANT CHANGE UP (ref 135–146)
TIBC SERPL-MCNC: 215 UG/DL — LOW (ref 220–430)
TROPONIN T SERPL-MCNC: 0.02 NG/ML — HIGH
TROPONIN T SERPL-MCNC: 0.04 NG/ML — CRITICAL HIGH
UIBC SERPL-MCNC: 200 UG/DL — SIGNIFICANT CHANGE UP (ref 110–370)
WBC # BLD: 11.22 K/UL — HIGH (ref 4.8–10.8)
WBC # FLD AUTO: 11.22 K/UL — HIGH (ref 4.8–10.8)

## 2020-09-02 PROCEDURE — 73723 MRI JOINT LWR EXTR W/O&W/DYE: CPT | Mod: 26,LT

## 2020-09-02 PROCEDURE — 99233 SBSQ HOSP IP/OBS HIGH 50: CPT

## 2020-09-02 RX ORDER — ENOXAPARIN SODIUM 100 MG/ML
90 INJECTION SUBCUTANEOUS EVERY 12 HOURS
Refills: 0 | Status: DISCONTINUED | OUTPATIENT
Start: 2020-09-02 | End: 2020-09-03

## 2020-09-02 RX ORDER — HEPARIN SODIUM 5000 [USP'U]/ML
3500 INJECTION INTRAVENOUS; SUBCUTANEOUS ONCE
Refills: 0 | Status: COMPLETED | OUTPATIENT
Start: 2020-09-02 | End: 2020-09-02

## 2020-09-02 RX ORDER — MAGNESIUM SULFATE 500 MG/ML
2 VIAL (ML) INJECTION ONCE
Refills: 0 | Status: COMPLETED | OUTPATIENT
Start: 2020-09-02 | End: 2020-09-02

## 2020-09-02 RX ORDER — APIXABAN 2.5 MG/1
1 TABLET, FILM COATED ORAL
Qty: 90 | Refills: 0
Start: 2020-09-02 | End: 2020-11-30

## 2020-09-02 RX ORDER — ENOXAPARIN SODIUM 100 MG/ML
90 INJECTION SUBCUTANEOUS ONCE
Refills: 0 | Status: COMPLETED | OUTPATIENT
Start: 2020-09-02 | End: 2020-09-02

## 2020-09-02 RX ORDER — SUCRALFATE 1 G
1 TABLET ORAL EVERY 12 HOURS
Refills: 0 | Status: DISCONTINUED | OUTPATIENT
Start: 2020-09-02 | End: 2020-09-17

## 2020-09-02 RX ORDER — POTASSIUM CHLORIDE 20 MEQ
40 PACKET (EA) ORAL ONCE
Refills: 0 | Status: COMPLETED | OUTPATIENT
Start: 2020-09-02 | End: 2020-09-02

## 2020-09-02 RX ORDER — ACETAMINOPHEN 500 MG
650 TABLET ORAL ONCE
Refills: 0 | Status: COMPLETED | OUTPATIENT
Start: 2020-09-02 | End: 2020-09-02

## 2020-09-02 RX ADMIN — Medication 40 MILLIEQUIVALENT(S): at 15:41

## 2020-09-02 RX ADMIN — Medication 1 GRAM(S): at 17:31

## 2020-09-02 RX ADMIN — Medication 1 GRAM(S): at 15:40

## 2020-09-02 RX ADMIN — PANTOPRAZOLE SODIUM 40 MILLIGRAM(S): 20 TABLET, DELAYED RELEASE ORAL at 05:57

## 2020-09-02 RX ADMIN — ENOXAPARIN SODIUM 90 MILLIGRAM(S): 100 INJECTION SUBCUTANEOUS at 15:40

## 2020-09-02 RX ADMIN — Medication 50 MICROGRAM(S): at 05:56

## 2020-09-02 RX ADMIN — ENOXAPARIN SODIUM 90 MILLIGRAM(S): 100 INJECTION SUBCUTANEOUS at 20:27

## 2020-09-02 RX ADMIN — OXYCODONE AND ACETAMINOPHEN 1 TABLET(S): 5; 325 TABLET ORAL at 02:13

## 2020-09-02 RX ADMIN — Medication 50 GRAM(S): at 15:41

## 2020-09-02 RX ADMIN — Medication 650 MILLIGRAM(S): at 20:28

## 2020-09-02 RX ADMIN — PANTOPRAZOLE SODIUM 40 MILLIGRAM(S): 20 TABLET, DELAYED RELEASE ORAL at 17:31

## 2020-09-02 RX ADMIN — HEPARIN SODIUM 16 UNIT(S)/HR: 5000 INJECTION INTRAVENOUS; SUBCUTANEOUS at 01:43

## 2020-09-02 RX ADMIN — OXYCODONE AND ACETAMINOPHEN 1 TABLET(S): 5; 325 TABLET ORAL at 01:43

## 2020-09-02 RX ADMIN — CHLORHEXIDINE GLUCONATE 1 APPLICATION(S): 213 SOLUTION TOPICAL at 05:56

## 2020-09-02 RX ADMIN — HEPARIN SODIUM 3500 UNIT(S): 5000 INJECTION INTRAVENOUS; SUBCUTANEOUS at 01:00

## 2020-09-02 RX ADMIN — Medication 650 MILLIGRAM(S): at 21:36

## 2020-09-02 NOTE — PROGRESS NOTE ADULT - ASSESSMENT
ASSESSMENT AND PLAN:    The patient is a 64 year-old female with a PMH of HTN and hypothyroidism and a PSH of appendectomy and tonsillectomy presenting for shortness of breath, admitted for pulmonary embolism.    # Acute respiratory failure due to  submassive acute pulmonary embolism  - s/p thrombectomy by  IR on 8/31  - pt is off oxygen and comfortable at rest now on RA  - start therapeutic dose of  Lovenox 90 mg Q 12 hours, will consider Eliquis on d/c ( pt can get one month free supply)   - monitor pulse Ox     # Acute diastolic CHF/ PHTN  - d/c IV fluids  - start Fluid restriction 1500 ml in 24 hours, daily weight  - low sodium diet, intake and output monitoring   - pt is clinically improving after thrombectomy   - troponins are trending down   - c/w telemetry monitoring for one more day   - monitor for fluid overload       #  PUD Gastric ulcer at pylorus/ Gastritis   - pt has recent EGD , report is in the paper chart  - biopsy was negative for H.Pylori / dysplasia    - IV PPIs for now   - start Carafate 1 gm Q 12 hour   - avoid NSAIDS   - No evidence of active bleed  - pt was consulted by GI, recommendations noted     # Anemia  - likely dilutional, d/c IV fluids   - no active bleeding noted   - monitor H/H, keep Hb above 7.5 and active type/cross  - send anemia work up     # H/o hypertension  - DASH diet   - resume Lisinopril     # Inability to walk/ spinal stenosis/ degenerative Dz in L-spine/ Left hip pain   - get left hip MRI  - ortho consult   - pain control         # ? Soft tissue mass   - r/o malignancy   - decreased functional status and ability to ambulate  - denies bladder or bowel incontinence , denies focal deficits  - CT Scan and MRI of L/S in chart . Soft tissue mass  - MRI L hip and Ortho consult     # CHG  # DVT ppx- heparin gtt  # GI ppx- pantoprazole bid  # Diet- advance as tolerated   # Activity- bedrest for now  # Dispo- acute     CALL FAMILY MEMBER Dr. Terrazas ([facility]) with updates daily [phone]  Updated family member in person today at 1 pm ASSESSMENT AND PLAN:    The patient is a 64 year-old female with a PMH of HTN and hypothyroidism and a PSH of appendectomy and tonsillectomy presenting for shortness of breath, admitted for pulmonary embolism.    # Acute respiratory failure due to  submassive acute pulmonary embolism  - s/p thrombectomy by  IR on 8/31  - pt is off oxygen and comfortable at rest now on RA  - start therapeutic dose of  Lovenox 90 mg Q 12 hours, will consider Eliquis on d/c ( pt can get one month free supply)   - monitor pulse Ox     # Acute diastolic CHF/ PHTN  - d/c IV fluids  - start Fluid restriction 1500 ml in 24 hours, daily weight  - low sodium diet, intake and output monitoring   - pt is clinically improving after thrombectomy   - troponins are trending down   - c/w telemetry monitoring for one more day   - monitor for fluid overload       #  PUD Gastric ulcer at pylorus/ Gastritis   - pt has recent EGD , report is in the paper chart  - biopsy was negative for H.Pylori / dysplasia    - IV PPIs for now   - start Carafate 1 gm Q 12 hour   - avoid NSAIDS   - No evidence of active bleed  - pt was consulted by GI, recommendations noted     # Anemia  - likely dilutional, d/c IV fluids   - no active bleeding noted   - monitor H/H, keep Hb above 7.5 and active type/cross  - send anemia work up     # H/o hypertension  - DASH diet   - resume Lisinopril     # Inability to walk/ spinal stenosis/ degenerative Dz in L-spine/ Left hip pain   - get left hip MRI  - ortho consult   - pain control  - will consult PT/Rehab after an MRI and ortho evaluation   - OOB to chair     #Progress Note Handoff  Pending (specify):  left hip MRI, ortho consult, started on Lovenox today, f/u anemia work up, d/c fluids, started on fluid restriction   Family discussion: I spoke with pt's granddaughter on the phone at the bedside   Disposition: Home_ __/SNF___/Other________/Unknown at this time________

## 2020-09-02 NOTE — PROGRESS NOTE ADULT - SUBJECTIVE AND OBJECTIVE BOX
64 year-old female with a PMH of PUD, HTN and hypothyroidism and a PSH of appendectomy and tonsillectomy presenting for shortness of breath, weakness and dizziness. The patient was found to have submassive pulmonary embolism and underwent thrombectomy on 8/31/20 with IR. The patient was anticoagulated with Heparin ggt. She was followed by GI for PUD.  Pt was downgraded to telemetry from ICU on 9/1/20. In last 24 hours hemoglobin dropped, will send anemia work up, monitor H/H, no active bleeding noted.   Today pt is c/o left hip pain, unable to walk for a while now due to severe pain, will get left hip MRI and ortho consult, c/w pain medications. For PE start SQ Lovenox 90 mg Q 12 hours.       PAST MEDICAL & SURGICAL HISTORY  HTN (hypertension)  Gastric ulcer  S/P tonsillectomy  S/P appendectomy      ALLERGIES:  No Known Allergies    Vital Signs Last 24 Hrs  T(C): 36.9 (02 Sep 2020 13:00), Max: 37.2 (01 Sep 2020 20:19)  T(F): 98.4 (02 Sep 2020 13:00), Max: 98.9 (01 Sep 2020 20:19)  HR: 100 (02 Sep 2020 13:00) (87 - 101)  BP: 147/75 (02 Sep 2020 13:00) (106/68 - 147/75)  BP(mean): --  RR: 18 (02 Sep 2020 13:00) (18 - 18)  SpO2: 96% (01 Sep 2020 18:47) (96% - 96%)    PHYSICAL EXAM:  GEN: No acute distress  HEENT: normocephalic, atraumatic  LUNGS: decreased breath sounds at bases   HEART: S1/S2 present. RRR, no murmurs  ABD: Soft, non-tender, non-distended. Bowel sounds present  EXT: NC/NC/NE/2+PP/FRANCES  NEURO: AAOX3, normal affect, no focal neuro deficit       LABS:                        8.4    11.22 )-----------( 251      ( 02 Sep 2020 06:03 )             26.0   09-02    137  |  104  |  26<H>  ----------------------------<  111<H>  3.7   |  20  |  0.8    Ca    8.1<L>      02 Sep 2020 06:03  Mg     1.7     09-02    TPro  5.2<L>  /  Alb  2.9<L>  /  TBili  0.3  /  DBili  x   /  AST  20  /  ALT  16  /  AlkPhos  75  09-02    CARDIAC MARKERS ( 31 Aug 2020 04:24 )  x     / 0.08 ng/mL / x     / x     / x      CARDIAC MARKERS ( 30 Aug 2020 17:01 )  x     / 0.10 ng/mL / x     / x     / x          RADIOLOGY:  < from: VA Duplex Lower Ext Vein Scan, Bilat (08.31.20 @ 17:39) >  Impression:    No evidence of deep venous thrombosis or superficial thrombophlebitis in the right lower extremity.    Acute left popliteal, posterior tibial peroneal vein DVT    < end of copied text >    < from: IR Procedure (08.31.20 @ 12:48) >  FINDINGS:    1. Preliminary ultrasound demonstrates patent right common femoral vein.    2. Right pulmonary artery angiogram: Extensive filling defects within the right main pulmonary artery extending into the upper and lower lobes.    3. Left pulmonary artery angiogram: Small filling defects within the left lower lobe pulmonary arterial segmental branches with oligemia.    4. Successful mechanical suction thrombectomy in right main pulmonary artery as well as left lower lobe segmental pulmonary artery utilizing Inari suction thrombectomy catheter.    < end of copied text >  < from: Transthoracic Echocardiogram (08.31.20 @ 13:18) >  Summary:   1. Normal global left ventricular systolic function.   2. LV Ejection Fraction by Hardin's Method with a biplane EF of 59 %.   3. Mild left ventricular hypertrophy.   4. Mildly increased LV wall thickness.   5. Spectral Doppler shows impaired relaxation pattern of left ventricular myocardial filling (Grade I diastolic dysfunction).   6. Normal left atrial size.   7. Normal right atrial size.   8. Structurally normal mitral valve, with normal leaflet excursion.   9. Trace mitral valve regurgitation.  10. Mild-moderate tricuspid regurgitation.  11. Sclerotic aortic valve with normal opening.  12. Mild pulmonic valve regurgitation.  13. Estimated pulmonary artery systolic pressure is 47.5 mmHg assuming a right atrial pressure of 5 mmHg, which is consistent with mild pulmonary hypertension.  14. LA volume Index is 14.6 ml/m² ml/m2.      MEDICATIONS  (STANDING):  chlorhexidine 4% Liquid 1 Application(s) Topical <User Schedule>  enoxaparin Injectable 90 milliGRAM(s) SubCutaneous every 12 hours  levothyroxine 50 MICROGram(s) Oral daily  pantoprazole  Injectable 40 milliGRAM(s) IV Push two times a day  sucralfate 1 Gram(s) Oral every 12 hours    MEDICATIONS  (PRN):  bisacodyl 5 milliGRAM(s) Oral every 12 hours PRN Constipation  oxycodone    5 mG/acetaminophen 325 mG 1 Tablet(s) Oral every 6 hours PRN Severe Pain (7 - 10)

## 2020-09-03 LAB
ANION GAP SERPL CALC-SCNC: 10 MMOL/L — SIGNIFICANT CHANGE UP (ref 7–14)
APPEARANCE UR: ABNORMAL
APTT BLD: 35.4 SEC — SIGNIFICANT CHANGE UP (ref 27–39.2)
BACTERIA # UR AUTO: ABNORMAL
BASOPHILS # BLD AUTO: 0.02 K/UL — SIGNIFICANT CHANGE UP (ref 0–0.2)
BASOPHILS NFR BLD AUTO: 0.2 % — SIGNIFICANT CHANGE UP (ref 0–1)
BILIRUB UR-MCNC: NEGATIVE — SIGNIFICANT CHANGE UP
BUN SERPL-MCNC: 15 MG/DL — SIGNIFICANT CHANGE UP (ref 10–20)
CALCIUM SERPL-MCNC: 8.4 MG/DL — LOW (ref 8.5–10.1)
CHLORIDE SERPL-SCNC: 105 MMOL/L — SIGNIFICANT CHANGE UP (ref 98–110)
CO2 SERPL-SCNC: 22 MMOL/L — SIGNIFICANT CHANGE UP (ref 17–32)
COLOR SPEC: YELLOW — SIGNIFICANT CHANGE UP
CREAT SERPL-MCNC: 0.7 MG/DL — SIGNIFICANT CHANGE UP (ref 0.7–1.5)
DIFF PNL FLD: NEGATIVE — SIGNIFICANT CHANGE UP
EOSINOPHIL # BLD AUTO: 0.12 K/UL — SIGNIFICANT CHANGE UP (ref 0–0.7)
EOSINOPHIL NFR BLD AUTO: 1 % — SIGNIFICANT CHANGE UP (ref 0–8)
EPI CELLS # UR: 1 /HPF — SIGNIFICANT CHANGE UP (ref 0–5)
FERRITIN SERPL-MCNC: 623 NG/ML — HIGH (ref 15–150)
FOLATE SERPL-MCNC: 10.6 NG/ML — SIGNIFICANT CHANGE UP
GLUCOSE SERPL-MCNC: 97 MG/DL — SIGNIFICANT CHANGE UP (ref 70–99)
GLUCOSE UR QL: NEGATIVE — SIGNIFICANT CHANGE UP
HAPTOGLOB SERPL-MCNC: 446 MG/DL — HIGH (ref 34–200)
HCT VFR BLD CALC: 29.4 % — LOW (ref 37–47)
HGB BLD-MCNC: 9.6 G/DL — LOW (ref 12–16)
HYALINE CASTS # UR AUTO: 4 /LPF — SIGNIFICANT CHANGE UP (ref 0–7)
IMM GRANULOCYTES NFR BLD AUTO: 0.6 % — HIGH (ref 0.1–0.3)
INR BLD: 1.28 RATIO — SIGNIFICANT CHANGE UP (ref 0.65–1.3)
KETONES UR-MCNC: SIGNIFICANT CHANGE UP
LEUKOCYTE ESTERASE UR-ACNC: ABNORMAL
LYMPHOCYTES # BLD AUTO: 1.38 K/UL — SIGNIFICANT CHANGE UP (ref 1.2–3.4)
LYMPHOCYTES # BLD AUTO: 11.8 % — LOW (ref 20.5–51.1)
MAGNESIUM SERPL-MCNC: 2.1 MG/DL — SIGNIFICANT CHANGE UP (ref 1.8–2.4)
MCHC RBC-ENTMCNC: 30.1 PG — SIGNIFICANT CHANGE UP (ref 27–31)
MCHC RBC-ENTMCNC: 32.7 G/DL — SIGNIFICANT CHANGE UP (ref 32–37)
MCV RBC AUTO: 92.2 FL — SIGNIFICANT CHANGE UP (ref 81–99)
MONOCYTES # BLD AUTO: 0.86 K/UL — HIGH (ref 0.1–0.6)
MONOCYTES NFR BLD AUTO: 7.4 % — SIGNIFICANT CHANGE UP (ref 1.7–9.3)
NEUTROPHILS # BLD AUTO: 9.24 K/UL — HIGH (ref 1.4–6.5)
NEUTROPHILS NFR BLD AUTO: 79 % — HIGH (ref 42.2–75.2)
NITRITE UR-MCNC: POSITIVE
NRBC # BLD: 0 /100 WBCS — SIGNIFICANT CHANGE UP (ref 0–0)
PH UR: 6 — SIGNIFICANT CHANGE UP (ref 5–8)
PHOSPHATE SERPL-MCNC: 3 MG/DL — SIGNIFICANT CHANGE UP (ref 2.1–4.9)
PLATELET # BLD AUTO: 255 K/UL — SIGNIFICANT CHANGE UP (ref 130–400)
POTASSIUM SERPL-MCNC: 4.2 MMOL/L — SIGNIFICANT CHANGE UP (ref 3.5–5)
POTASSIUM SERPL-SCNC: 4.2 MMOL/L — SIGNIFICANT CHANGE UP (ref 3.5–5)
PROT UR-MCNC: ABNORMAL
PROTHROM AB SERPL-ACNC: 14.7 SEC — HIGH (ref 9.95–12.87)
RBC # BLD: 3.19 M/UL — LOW (ref 4.2–5.4)
RBC # FLD: 12.7 % — SIGNIFICANT CHANGE UP (ref 11.5–14.5)
RBC CASTS # UR COMP ASSIST: 7 /HPF — HIGH (ref 0–4)
SODIUM SERPL-SCNC: 137 MMOL/L — SIGNIFICANT CHANGE UP (ref 135–146)
SP GR SPEC: 1.03 — HIGH (ref 1.01–1.02)
TROPONIN T SERPL-MCNC: 0.01 NG/ML — SIGNIFICANT CHANGE UP
UROBILINOGEN FLD QL: SIGNIFICANT CHANGE UP
VIT B12 SERPL-MCNC: 383 PG/ML — SIGNIFICANT CHANGE UP (ref 232–1245)
WBC # BLD: 11.69 K/UL — HIGH (ref 4.8–10.8)
WBC # FLD AUTO: 11.69 K/UL — HIGH (ref 4.8–10.8)
WBC UR QL: 34 /HPF — HIGH (ref 0–5)

## 2020-09-03 PROCEDURE — 99233 SBSQ HOSP IP/OBS HIGH 50: CPT

## 2020-09-03 RX ORDER — CEFTRIAXONE 500 MG/1
INJECTION, POWDER, FOR SOLUTION INTRAMUSCULAR; INTRAVENOUS
Refills: 0 | Status: DISCONTINUED | OUTPATIENT
Start: 2020-09-03 | End: 2020-09-03

## 2020-09-03 RX ORDER — ACETAMINOPHEN 500 MG
650 TABLET ORAL EVERY 6 HOURS
Refills: 0 | Status: DISCONTINUED | OUTPATIENT
Start: 2020-09-03 | End: 2020-09-08

## 2020-09-03 RX ORDER — CEFTRIAXONE 500 MG/1
1000 INJECTION, POWDER, FOR SOLUTION INTRAMUSCULAR; INTRAVENOUS ONCE
Refills: 0 | Status: COMPLETED | OUTPATIENT
Start: 2020-09-03 | End: 2020-09-03

## 2020-09-03 RX ORDER — LIDOCAINE 4 G/100G
1 CREAM TOPICAL ONCE
Refills: 0 | Status: COMPLETED | OUTPATIENT
Start: 2020-09-03 | End: 2020-09-03

## 2020-09-03 RX ORDER — IRON SUCROSE 20 MG/ML
200 INJECTION, SOLUTION INTRAVENOUS EVERY 24 HOURS
Refills: 0 | Status: COMPLETED | OUTPATIENT
Start: 2020-09-03 | End: 2020-09-05

## 2020-09-03 RX ORDER — ENOXAPARIN SODIUM 100 MG/ML
90 INJECTION SUBCUTANEOUS ONCE
Refills: 0 | Status: DISCONTINUED | OUTPATIENT
Start: 2020-09-03 | End: 2020-09-03

## 2020-09-03 RX ORDER — CEFTRIAXONE 500 MG/1
2000 INJECTION, POWDER, FOR SOLUTION INTRAMUSCULAR; INTRAVENOUS EVERY 24 HOURS
Refills: 0 | Status: DISCONTINUED | OUTPATIENT
Start: 2020-09-04 | End: 2020-09-07

## 2020-09-03 RX ADMIN — Medication 650 MILLIGRAM(S): at 21:54

## 2020-09-03 RX ADMIN — Medication 50 MICROGRAM(S): at 05:31

## 2020-09-03 RX ADMIN — IRON SUCROSE 110 MILLIGRAM(S): 20 INJECTION, SOLUTION INTRAVENOUS at 12:28

## 2020-09-03 RX ADMIN — Medication 650 MILLIGRAM(S): at 22:54

## 2020-09-03 RX ADMIN — Medication 1 GRAM(S): at 17:31

## 2020-09-03 RX ADMIN — PANTOPRAZOLE SODIUM 40 MILLIGRAM(S): 20 TABLET, DELAYED RELEASE ORAL at 17:31

## 2020-09-03 RX ADMIN — Medication 1 GRAM(S): at 05:31

## 2020-09-03 RX ADMIN — CEFTRIAXONE 100 MILLIGRAM(S): 500 INJECTION, POWDER, FOR SOLUTION INTRAMUSCULAR; INTRAVENOUS at 12:28

## 2020-09-03 RX ADMIN — PANTOPRAZOLE SODIUM 40 MILLIGRAM(S): 20 TABLET, DELAYED RELEASE ORAL at 05:33

## 2020-09-03 RX ADMIN — ENOXAPARIN SODIUM 90 MILLIGRAM(S): 100 INJECTION SUBCUTANEOUS at 08:11

## 2020-09-03 RX ADMIN — CHLORHEXIDINE GLUCONATE 1 APPLICATION(S): 213 SOLUTION TOPICAL at 05:31

## 2020-09-03 NOTE — PROGRESS NOTE ADULT - ASSESSMENT
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     PLAN  # High grade fever d/t septic arthritis vs. UTI   - started IV Rocephin  - f/u urine Cx   - Tylenol PRN for fever  - f/u blood Cx   - f/u joint fluids analysis (IR consulted for arthrocentesis)   - ID consult     # Submassive PE l/t acute respiratory failure   - s/p thrombectomy by IR on 8/31  - pt is off oxygen and comfortable at rest now on RA  - c/w Lovenox 90 mg Q 12 hours post-arthrocentesis  - likely Eliquis on d/c   - monitor pulse Ox   - start Lidoderm patch for pleuritic chest pain     # Acute diastolic CHF/ PHTN  - c/w Fluid restriction 1500 ml in 24 hours, daily weights  - low sodium diet, intake and output monitoring   - pt is clinically improving after thrombectomy   - troponin WNL today   - d/c telemetry   - monitor for fluid overload       # PUD Gastric ulcer at pylorus/ Gastritis   - pt has recent EGD , report is in the paper chart  - biopsy was negative for H.Pylori / dysplasia    - IV PPIs for now   - Carafate 1 gm Q 12 hour   - avoid NSAIDS   - No evidence of active bleed  - pt was consulted by GI, recommendations noted     # Iron deficiency anemia  - started IV iron infusions for 3 days   - no active bleeding noted   - monitor H/H, keep Hb above 7.5 and active type/cross  - H/H currently 9.6/30.1    # H/o hypertension  - DASH diet   - resume Lisinopril     # Inability to walk/ spinal stenosis/ degenerative Dz in L-spine/ Left hip pain   - MRI findings noted  - case d/w ortho  - Arthrocentesis by IR arranged for tomorrow,   - hold Lovenox tonight and tomorrow morning   - pain control  - PT/rehab evaluation   - fall precautions   - OOB to chair     #Progress Note Handoff  Pending (specify): hold Lovenox tonight and tomorrow morning, pt'll go for IR guided arthrocentesis in AM , start IV Venofer, ID consult, f/u urine Cx, Pr/Rehab evaluation   Family discussion: I spoke with pt and her friend, she agreed with a plan of care   Disposition: Home_ __/SNF___/Other________/Unknown at this time________

## 2020-09-03 NOTE — PHYSICAL THERAPY INITIAL EVALUATION ADULT - SPECIFY REASON(S)
Patient has no activity orders at this time. Pt. is on bedrest. Discussed with Dr. Mckeon who stated PT is premature at this time. PT Eval pending further investigation in IR for avascular necrosis.

## 2020-09-03 NOTE — CONSULT NOTE ADULT - SUBJECTIVE AND OBJECTIVE BOX
INTERVENTIONAL RADIOLOGY CONSULT:     Procedure Requested: left hip aspiration    HPI:  The patient is a 64 year-old female with a PMH of HTN and hypothyroidism and a PSH of appendectomy and tonsillectomy presenting for shortness of breath. The patient reports that on Thursday she began experiencing shortness of breath on minimal ambulation associated w/ weakness and dizziness; she denied associated chest pain or pressure; as the shortness of breath worsened she presented to the ED today.    Of note, about 1 week ago she started having pain in the left hip; had an MRI done which showed spinal stenosis at L3-L4 and L4-L5; was given a "steroid shot" which did not help the pain. Around the same time the patient began complaining of nausea; started to have non-bloody non-bilious vomiting; had an EGD in [place] (records faxed to us, in chart) which revealed thickening of the gastric pylorus with a deep ulcer suspicious for perforation but with no free air; she was started on a PPI BID which she reports alleviated the nausea and vomiting.     In the ED, T 97.6 F, , /94, RR 19; O2 sat 94%. CT angio chest revealed bilateral central pulmonary embolism extending into subsegmental branches w/ evidence of right heart strain. She was started on a heparin gtt; IR was c/s and are planning to do a thrombectomy tomorrow in the AM. The patient reports no recent history of trauma or any history of blood clotting disorders either in herself or in her family. pert called started on heparin and protonix drip hb stable (30 Aug 2020 22:24)      PAST MEDICAL & SURGICAL HISTORY:  HTN (hypertension)  Gastric ulcer  S/P tonsillectomy  S/P appendectomy      MEDICATIONS  (STANDING):  cefTRIAXone   IVPB      chlorhexidine 4% Liquid 1 Application(s) Topical <User Schedule>  enoxaparin Injectable 90 milliGRAM(s) SubCutaneous every 12 hours  iron sucrose IVPB 200 milliGRAM(s) IV Intermittent every 24 hours  levothyroxine 50 MICROGram(s) Oral daily  pantoprazole  Injectable 40 milliGRAM(s) IV Push two times a day  sucralfate 1 Gram(s) Oral every 12 hours    MEDICATIONS  (PRN):  bisacodyl 5 milliGRAM(s) Oral every 12 hours PRN Constipation  oxycodone    5 mG/acetaminophen 325 mG 1 Tablet(s) Oral every 6 hours PRN Severe Pain (7 - 10)      Allergies    No Known Allergies    Intolerances    FAMILY HISTORY:  Family history of early CAD: son      Physical Exam:   Vital Signs Last 24 Hrs  T(C): 37.6 (03 Sep 2020 13:05), Max: 39 (02 Sep 2020 20:17)  T(F): 99.6 (03 Sep 2020 13:05), Max: 102.2 (02 Sep 2020 20:17)  HR: 98 (03 Sep 2020 13:05) (94 - 113)  BP: 136/85 (03 Sep 2020 13:05) (128/80 - 160/76)  BP(mean): --  RR: 18 (03 Sep 2020 13:05) (16 - 19)  SpO2: --      Labs:                         9.6    11.69 )-----------( 255      ( 03 Sep 2020 06:52 )             29.4     09-03    137  |  105  |  15  ----------------------------<  97  4.2   |  22  |  0.7    Ca    8.4<L>      03 Sep 2020 06:52  Phos  3.0     09-03  Mg     2.1     09-03    TPro  5.2<L>  /  Alb  2.9<L>  /  TBili  0.3  /  DBili  x   /  AST  20  /  ALT  16  /  AlkPhos  75  09-02    PT/INR - ( 03 Sep 2020 11:37 )   PT: 14.70 sec;   INR: 1.28 ratio         PTT - ( 03 Sep 2020 11:37 )  PTT:35.4 sec    Pertinent labs:                      9.6    11.69 )-----------( 255      ( 03 Sep 2020 06:52 )             29.4       09-03    137  |  105  |  15  ----------------------------<  97  4.2   |  22  |  0.7    Ca    8.4<L>      03 Sep 2020 06:52  Phos  3.0     09-03  Mg     2.1     09-03    TPro  5.2<L>  /  Alb  2.9<L>  /  TBili  0.3  /  DBili  x   /  AST  20  /  ALT  16  /  AlkPhos  75  09-02      PT/INR - ( 03 Sep 2020 11:37 )   PT: 14.70 sec;   INR: 1.28 ratio         PTT - ( 03 Sep 2020 11:37 )  PTT:35.4 sec    Radiology & Additional Studies:     Radiology imaging reviewed.       ASSESSMENT/ PLAN:   64 year-old female with a PMH of HTN and hypothyroidism and a PSH of appendectomy and tonsillectomy presenting for shortness of breath, admitted for pulmonary embolism.  - s/p IR thrombectomy 8/31  - fever and WBC 11  - MRI reviewed, left hip fluid collection  - suspicion of septic arthritis  - will plan for left hip aspiration tomorrow 9/4  - HOLD anticoagulation  - NPO at midnight  - d/w resident          Risks, benefits, and alternatives to treatment discussed. All questions answered with understanding.    Thank you for the courtesy of this consult, please call [phone] with any further questions.

## 2020-09-03 NOTE — PROGRESS NOTE ADULT - SUBJECTIVE AND OBJECTIVE BOX
64 year-old female with a PMH of PUD, HTN and hypothyroidism and a PSH of appendectomy and tonsillectomy presenting for shortness of breath, weakness and dizziness. The patient was found to have submassive pulmonary embolism and underwent thrombectomy on 8/31/20 with IR. The patient was anticoagulated with Heparin ggt. She was followed by GI for PUD.  Pt was downgraded to telemetry from ICU on 9/1/20. While in the hospital pt was diagnosed with iron deficiency amenia.   MRI of the left hip showed significant joint effusion, case discussed with ortho attending, IR consulted for joint drainage on 9/4.  Today pt feels better, has less pain, she is c/o pleuritic chest pain.        PAST MEDICAL & SURGICAL HISTORY  HTN (hypertension)  Gastric ulcer  S/P tonsillectomy  S/P appendectomy      ALLERGIES:  No Known Allergies    Vital Signs Last 24 Hrs  T(C): 37.6 (03 Sep 2020 13:05), Max: 39 (02 Sep 2020 20:17)  T(F): 99.6 (03 Sep 2020 13:05), Max: 102.2 (02 Sep 2020 20:17)  HR: 98 (03 Sep 2020 13:05) (94 - 113)  BP: 136/85 (03 Sep 2020 13:05) (128/80 - 160/76)  BP(mean): --  RR: 18 (03 Sep 2020 13:05) (16 - 19)  SpO2: --    PHYSICAL EXAM:  GEN: No acute distress  HEENT: normocephalic, atraumatic  LUNGS: decreased breath sounds at bases   HEART: S1/S2 present. RRR, no murmurs  ABD: Soft, non-tender, non-distended. Bowel sounds present  EXT: NC/NC/NE/2+PP/FRANCES, decreased ROM in LLE due to pain   NEURO: AAOX3, normal affect, no focal neuro deficit       LABS:                                    9.6    11.69 )-----------( 255      ( 03 Sep 2020 06:52 )             29.4   09-03    137  |  105  |  15  ----------------------------<  97  4.2   |  22  |  0.7    Ca    8.4<L>      03 Sep 2020 06:52  Phos  3.0     09-03  Mg     2.1     09-03    TPro  5.2<L>  /  Alb  2.9<L>  /  TBili  0.3  /  DBili  x   /  AST  20  /  ALT  16  /  AlkPhos  75  09-02        RADIOLOGY:  < from: VA Duplex Lower Ext Vein Scan, Bilat (08.31.20 @ 17:39) >  Impression:    No evidence of deep venous thrombosis or superficial thrombophlebitis in the right lower extremity.    Acute left popliteal, posterior tibial peroneal vein DVT    < end of copied text >    < from: IR Procedure (08.31.20 @ 12:48) >  FINDINGS:    1. Preliminary ultrasound demonstrates patent right common femoral vein.    2. Right pulmonary artery angiogram: Extensive filling defects within the right main pulmonary artery extending into the upper and lower lobes.    3. Left pulmonary artery angiogram: Small filling defects within the left lower lobe pulmonary arterial segmental branches with oligemia.    4. Successful mechanical suction thrombectomy in right main pulmonary artery as well as left lower lobe segmental pulmonary artery utilizing Inari suction thrombectomy catheter.    < end of copied text >  < from: Transthoracic Echocardiogram (08.31.20 @ 13:18) >  Summary:   1. Normal global left ventricular systolic function.   2. LV Ejection Fraction by Hardin's Method with a biplane EF of 59 %.   3. Mild left ventricular hypertrophy.   4. Mildly increased LV wall thickness.   5. Spectral Doppler shows impaired relaxation pattern of left ventricular myocardial filling (Grade I diastolic dysfunction).   6. Normal left atrial size.   7. Normal right atrial size.   8. Structurally normal mitral valve, with normal leaflet excursion.   9. Trace mitral valve regurgitation.  10. Mild-moderate tricuspid regurgitation.  11. Sclerotic aortic valve with normal opening.  12. Mild pulmonic valve regurgitation.  13. Estimated pulmonary artery systolic pressure is 47.5 mmHg assuming a right atrial pressure of 5 mmHg, which is consistent with mild pulmonary hypertension.  14. LA volume Index is 14.6 ml/m² ml/m2.    < from: MR Hip w/wo IV Cont, Left (09.02.20 @ 19:42) >  IMPRESSION:    1. 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 Associated stress related sclerosis/edema in the left femoral neck and left acetabulum. Extensive surrounding edema/inflammation. Given these findings, septic arthritis cannot be excluded.  2. Large heterogeneous joint effusion may be due to internal hemorrhage.  3. Apparent tendon tears as above.    < end of copied text >      MEDICATIONS  (STANDING):  cefTRIAXone   IVPB      chlorhexidine 4% Liquid 1 Application(s) Topical <User Schedule>  enoxaparin Injectable 90 milliGRAM(s) SubCutaneous every 12 hours  iron sucrose IVPB 200 milliGRAM(s) IV Intermittent every 24 hours  levothyroxine 50 MICROGram(s) Oral daily  pantoprazole  Injectable 40 milliGRAM(s) IV Push two times a day  sucralfate 1 Gram(s) Oral every 12 hours    MEDICATIONS  (PRN):  bisacodyl 5 milliGRAM(s) Oral every 12 hours PRN Constipation  oxycodone    5 mG/acetaminophen 325 mG 1 Tablet(s) Oral every 6 hours PRN Severe Pain (7 - 10)

## 2020-09-03 NOTE — PROGRESS NOTE ADULT - ASSESSMENT
ASSESSMENT AND PLAN:    The patient is a 64 year-old female with a PMH of HTN and hypothyroidism and a PSH of appendectomy and tonsillectomy presenting for shortness of breath, admitted for pulmonary embolism.    # High grade fever/ r/o septic arthritis/ UTI   - start IV Rocephin  - f/u urine Cx   - Tylenol PRN for fever  - send blood Cx   - f/u joint fluids analysis ( IR consulted for arthrocentesis )   - ID consult     # Acute respiratory failure due to  submassive acute pulmonary embolism  - s/p thrombectomy by  IR on 8/31  - pt is off oxygen and comfortable at rest now on RA  - c/w  Lovenox 90 mg Q 12 hours, will consider Eliquis on d/c   - monitor pulse Ox   - start Lidoderm patch for pleuritic chest pain     # Acute diastolic CHF/ PHTN  - c/w  Fluid restriction 1500 ml in 24 hours, daily weight  - low sodium diet, intake and output monitoring   - pt is clinically improving after thrombectomy   - troponin WNL today   - d/c telemetry   - monitor for fluid overload       #  PUD Gastric ulcer at pylorus/ Gastritis   - pt has recent EGD , report is in the paper chart  - biopsy was negative for H.Pylori / dysplasia    - IV PPIs for now   - Carafate 1 gm Q 12 hour   - avoid NSAIDS   - No evidence of active bleed  - pt was consulted by GI, recommendations noted     # Iron deficiency anemia  -start IV iron infusions for 3 days   - no active bleeding noted   - monitor H/H, keep Hb above 7.5 and active type/cross      # H/o hypertension  - DASH diet   - resume Lisinopril     # Inability to walk/ spinal stenosis/ degenerative Dz in L-spine/ Left hip pain   - MRI findings noted , case d/w ortho attending at the bedside, arthrocentesis by IR arranged for tomorrow, hold Lovenox tonight and tomorrow morning   - pain control  - PT/rehab evaluation   - fall precautions   - OOB to chair     #Progress Note Handoff  Pending (specify): hold Lovenox tonight and tomorrow morning, pt'll go for IR guided arthrocentesis in AM , start IV Venofer, ID consult, f/u urine Cx, Pr/Rehab evaluation   Family discussion: I spoke with pt and her friend, she agreed with a plan of care   Disposition: Home_ __/SNF___/Other________/Unknown at this time________

## 2020-09-03 NOTE — PROGRESS NOTE ADULT - SUBJECTIVE AND OBJECTIVE BOX
HPI  The patient is a 64 year-old female with a PMH of HTN and hypothyroidism and a PSH of appendectomy and tonsillectomy presenting for shortness of breath. The patient reports that on Thursday prior to admission that she began experiencing shortness of breath on minimal ambulation associated w/ weakness and dizziness; she denied associated chest pain or pressure; as the shortness of breath worsened she presented to the ED.    Of note, about 1 week prior to admission she started having pain in the left hip; had an MRI done which showed spinal stenosis at L3-L4 and L4-L5; was given a "steroid shot" which did not help the pain. Around the same time the patient began complaining of nausea; started to have non-bloody non-bilious vomiting; had an EGD in [place] (records faxed to us, in chart) which revealed thickening of the gastric pylorus with a deep ulcer suspicious for perforation but with no free air; she was started on a PPI BID which she reports alleviated the nausea and vomiting.     In the ED, T 97.6 F, , /94, RR 19; O2 sat 94%. CT angio chest revealed bilateral central pulmonary embolism extending into subsegmental branches w/ evidence of right heart strain. She was started on a heparin gtt; IR was c/s and performed thrombectomy.     Currently admitted to medicine with the primary diagnosis of Pulmonary embolism. Today is hospital day 4d.     INTERVAL HPI:  Patient was examined and seen at bedside. This morning she is resting comfortably in bed and reports no new issues or overnight events. Patient's MRI to assess soft tissue mass on prior CT came back negative for any suspicious malignancy but positive for necrosis of femoral head possibly 2/2 to septic arthritis. Biopsy/aspiration planned with IR tomorrow. NPO at midnight, holding Lovenox. U/A positive and given recent fever, patient was started on Rocephin as well as Venefir for low iron.     PAST MEDICAL & SURGICAL HISTORY  HTN (hypertension)  Gastric ulcer  S/P tonsillectomy  S/P appendectomy    ALLERGIES  No Known Allergies    MEDICATIONS  STANDING MEDICATIONS  cefTRIAXone   IVPB      chlorhexidine 4% Liquid 1 Application(s) Topical <User Schedule>  enoxaparin Injectable 90 milliGRAM(s) SubCutaneous once  iron sucrose IVPB 200 milliGRAM(s) IV Intermittent every 24 hours  levothyroxine 50 MICROGram(s) Oral daily  pantoprazole  Injectable 40 milliGRAM(s) IV Push two times a day  sucralfate 1 Gram(s) Oral every 12 hours    PRN MEDICATIONS  bisacodyl 5 milliGRAM(s) Oral every 12 hours PRN  oxycodone    5 mG/acetaminophen 325 mG 1 Tablet(s) Oral every 6 hours PRN    VITALS:  T(F): 99.6  HR: 98  BP: 136/85  RR: 18  SpO2: --    PHYSICAL EXAM  GEN: NAD, Resting comfortably in bed  PULM: Clear to auscultation bilaterally, No wheezes  CVS: Regular rate and rhythm, S1-S2, no murmurs  ABD: Soft, non-tender, non-distended, no guarding  NEURO: A&Ox3, no focal deficits    LABS                        9.6    11.69 )-----------( 255      ( 03 Sep 2020 06:52 )             29.4     -    137  |  105  |  15  ----------------------------<  97  4.2   |  22  |  0.7    Ca    8.4<L>      03 Sep 2020 06:52  Phos  3.0       Mg     2.1     -    TPro  5.2<L>  /  Alb  2.9<L>  /  TBili  0.3  /  DBili  x   /  AST  20  /  ALT  16  /  AlkPhos  75  09-02    PT/INR - ( 03 Sep 2020 11:37 )   PT: 14.70 sec;   INR: 1.28 ratio    PTT - ( 03 Sep 2020 11:37 )  PTT:35.4 sec  Urinalysis Basic - ( 03 Sep 2020 01:30 )    Color: Yellow / Appearance: Slightly Turbid / S.031 / pH: x  Gluc: x / Ketone: Trace  / Bili: Negative / Urobili: <2 mg/dL   Blood: x / Protein: 30 mg/dL / Nitrite: Positive   Leuk Esterase: Moderate / RBC: 7 /HPF / WBC 34 /HPF   Sq Epi: x / Non Sq Epi: 1 /HPF / Bacteria: Many      Troponin T, Serum: 0.01 ng/mL (20 @ 06:52)      CARDIAC MARKERS ( 03 Sep 2020 06:52 )  x     / 0.01 ng/mL / x     / x     / x      CARDIAC MARKERS ( 02 Sep 2020 11:15 )  x     / 0.02 ng/mL / x     / x     / x      CARDIAC MARKERS ( 02 Sep 2020 06:03 )  x     / 0.04 ng/mL / x     / x     / x          RADIOLOGY    MR HIP w/wo Cont, :  1. 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 Associated stress related sclerosis/edema in the left femoral neck and left acetabulum. Extensive surrounding edema/inflammation. Given these findings, septic arthritis cannot be excluded.  2. Large heterogeneous joint effusion may be due to internal hemorrhage.  3. Apparent tendon tears as above.

## 2020-09-03 NOTE — CONSULT NOTE ADULT - SUBJECTIVE AND OBJECTIVE BOX
HIP PAIN  CONSULT  T(C): 37.6 (09-03-20 @ 13:05), Max: 39 (09-02-20 @ 20:17)  HR: 98 (09-03-20 @ 13:05) (94 - 113)  BP: 136/85 (09-03-20 @ 13:05) (128/80 - 160/76)  RR: 18 (09-03-20 @ 13:05) (16 - 19)  SpO2: --    PULMONARY EMBOLISM  ^SOB  Family history of early CAD  Handoff  MEWS Score  Diabetes  HTN (hypertension)  Gastric ulcer  Pulmonary embolism  Suspected deep vein thrombosis (DVT)  Pulmonary thromboembolism  S/P tonsillectomy  S/P appendectomy  SOB  [email or web address]                          9.6    11.69 )-----------( 255      ( 03 Sep 2020 06:52 )             29.4     09-03    137  |  105  |  15  ----------------------------<  97  4.2   |  22  |  0.7    Ca    8.4<L>      03 Sep 2020 06:52  Phos  3.0     09-03  Mg     2.1     09-03    TPro  5.2<L>  /  Alb  2.9<L>  /  TBili  0.3  /  DBili  x   /  AST  20  /  ALT  16  /  AlkPhos  75  09-02    PT/INR - ( 03 Sep 2020 11:37 )   PT: 14.70 sec;   INR: 1.28 ratio         PTT - ( 03 Sep 2020 11:37 )  PTT:35.4 sec  bisacodyl 5 milliGRAM(s) Oral every 12 hours PRN  cefTRIAXone   IVPB      chlorhexidine 4% Liquid 1 Application(s) Topical <User Schedule>  enoxaparin Injectable 90 milliGRAM(s) SubCutaneous every 12 hours  iron sucrose IVPB 200 milliGRAM(s) IV Intermittent every 24 hours  levothyroxine 50 MICROGram(s) Oral daily  oxycodone    5 mG/acetaminophen 325 mG 1 Tablet(s) Oral every 6 hours PRN  pantoprazole  Injectable 40 milliGRAM(s) IV Push two times a day  sucralfate 1 Gram(s) Oral every 12 hours    No Known Allergies    64y    this pt has a history of left hip pain fore greater than 1 year   has been followed by pain management Dr Mosley for possible spinal stenosis, epidural injections have not give the pt relief of  her left hip pain.  during the admission for acute PE with r heart strain the pt has been sent for an MRI w& w/o contrast of the left hip     < from: MR Hip w/wo IV Cont, Left (09.02.20 @ 19:42) >  IMPRESSION:    1. 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 Associated stress related sclerosis/edema in the left femoral neck and left acetabulum. Extensive surrounding edema/inflammation. Given these findings, septic arthritis cannot be excluded.  2. Large heterogeneous joint effusion may be due to internal hemorrhage.  3. Apparent tendon tears as above.    The pts hip movement is tolerable   log rolling mildly painful  nvi    a/p   avn of the left hip  IR to aspirate to r/o infection   If no evidence of infection   the pt should complete treatment for PE and undergo elective MARIA C at a later date   if there is an infection I&D and spacer & iv abx vs just abx  to be discussed

## 2020-09-04 LAB
ANION GAP SERPL CALC-SCNC: 11 MMOL/L — SIGNIFICANT CHANGE UP (ref 7–14)
APTT BLD: 25.4 SEC — LOW (ref 27–39.2)
B PERT IGG+IGM PNL SER: ABNORMAL
BASOPHILS # BLD AUTO: 0.02 K/UL — SIGNIFICANT CHANGE UP (ref 0–0.2)
BASOPHILS NFR BLD AUTO: 0.2 % — SIGNIFICANT CHANGE UP (ref 0–1)
BUN SERPL-MCNC: 12 MG/DL — SIGNIFICANT CHANGE UP (ref 10–20)
CALCIUM SERPL-MCNC: 8.1 MG/DL — LOW (ref 8.5–10.1)
CHLORIDE SERPL-SCNC: 102 MMOL/L — SIGNIFICANT CHANGE UP (ref 98–110)
CO2 SERPL-SCNC: 22 MMOL/L — SIGNIFICANT CHANGE UP (ref 17–32)
COLOR FLD: SIGNIFICANT CHANGE UP
CREAT SERPL-MCNC: 0.7 MG/DL — SIGNIFICANT CHANGE UP (ref 0.7–1.5)
EOSINOPHIL # BLD AUTO: 0.12 K/UL — SIGNIFICANT CHANGE UP (ref 0–0.7)
EOSINOPHIL NFR BLD AUTO: 1.1 % — SIGNIFICANT CHANGE UP (ref 0–8)
FLUID INTAKE SUBSTANCE CLASS: SIGNIFICANT CHANGE UP
FLUID SEGMENTED GRANULOCYTES: 83 % — SIGNIFICANT CHANGE UP
FOLATE+VIT B12 SERBLD-IMP: 4 % — SIGNIFICANT CHANGE UP
GLUCOSE SERPL-MCNC: 99 MG/DL — SIGNIFICANT CHANGE UP (ref 70–99)
GRAM STN FLD: SIGNIFICANT CHANGE UP
HCT VFR BLD CALC: 26.5 % — LOW (ref 37–47)
HGB BLD-MCNC: 8.7 G/DL — LOW (ref 12–16)
IMM GRANULOCYTES NFR BLD AUTO: 0.5 % — HIGH (ref 0.1–0.3)
INR BLD: 1.31 RATIO — HIGH (ref 0.65–1.3)
LYMPHOCYTES # BLD AUTO: 1.21 K/UL — SIGNIFICANT CHANGE UP (ref 1.2–3.4)
LYMPHOCYTES # BLD AUTO: 11.1 % — LOW (ref 20.5–51.1)
LYMPHOCYTES # FLD: 13 — SIGNIFICANT CHANGE UP
MAGNESIUM SERPL-MCNC: 1.8 MG/DL — SIGNIFICANT CHANGE UP (ref 1.8–2.4)
MCHC RBC-ENTMCNC: 29.8 PG — SIGNIFICANT CHANGE UP (ref 27–31)
MCHC RBC-ENTMCNC: 32.8 G/DL — SIGNIFICANT CHANGE UP (ref 32–37)
MCV RBC AUTO: 90.8 FL — SIGNIFICANT CHANGE UP (ref 81–99)
MONOCYTES # BLD AUTO: 0.82 K/UL — HIGH (ref 0.1–0.6)
MONOCYTES NFR BLD AUTO: 7.5 % — SIGNIFICANT CHANGE UP (ref 1.7–9.3)
NEUTROPHILS # BLD AUTO: 8.68 K/UL — HIGH (ref 1.4–6.5)
NEUTROPHILS NFR BLD AUTO: 79.6 % — HIGH (ref 42.2–75.2)
NRBC # BLD: 0 /100 WBCS — SIGNIFICANT CHANGE UP (ref 0–0)
PHOSPHATE SERPL-MCNC: 3.1 MG/DL — SIGNIFICANT CHANGE UP (ref 2.1–4.9)
PLATELET # BLD AUTO: 263 K/UL — SIGNIFICANT CHANGE UP (ref 130–400)
POTASSIUM SERPL-MCNC: 4.1 MMOL/L — SIGNIFICANT CHANGE UP (ref 3.5–5)
POTASSIUM SERPL-SCNC: 4.1 MMOL/L — SIGNIFICANT CHANGE UP (ref 3.5–5)
PROTHROM AB SERPL-ACNC: 15.1 SEC — HIGH (ref 9.95–12.87)
RBC # BLD: 2.92 M/UL — LOW (ref 4.2–5.4)
RBC # FLD: 12.6 % — SIGNIFICANT CHANGE UP (ref 11.5–14.5)
RCV VOL RI: HIGH /UL (ref 0–0)
SODIUM SERPL-SCNC: 135 MMOL/L — SIGNIFICANT CHANGE UP (ref 135–146)
SPECIMEN SOURCE: SIGNIFICANT CHANGE UP
TOTAL NUCLEATED CELL COUNT, BODY FLUID: 3551 /UL — SIGNIFICANT CHANGE UP
TUBE TYPE: SIGNIFICANT CHANGE UP
WBC # BLD: 10.9 K/UL — HIGH (ref 4.8–10.8)
WBC # FLD AUTO: 10.9 K/UL — HIGH (ref 4.8–10.8)

## 2020-09-04 PROCEDURE — 77002 NEEDLE LOCALIZATION BY XRAY: CPT | Mod: 26

## 2020-09-04 PROCEDURE — 20610 DRAIN/INJ JOINT/BURSA W/O US: CPT | Mod: LT

## 2020-09-04 PROCEDURE — 99233 SBSQ HOSP IP/OBS HIGH 50: CPT

## 2020-09-04 RX ORDER — LISINOPRIL 2.5 MG/1
5 TABLET ORAL DAILY
Refills: 0 | Status: DISCONTINUED | OUTPATIENT
Start: 2020-09-04 | End: 2020-09-17

## 2020-09-04 RX ORDER — POLYETHYLENE GLYCOL 3350 17 G/17G
17 POWDER, FOR SOLUTION ORAL DAILY
Refills: 0 | Status: DISCONTINUED | OUTPATIENT
Start: 2020-09-04 | End: 2020-09-10

## 2020-09-04 RX ORDER — ENOXAPARIN SODIUM 100 MG/ML
90 INJECTION SUBCUTANEOUS EVERY 12 HOURS
Refills: 0 | Status: DISCONTINUED | OUTPATIENT
Start: 2020-09-04 | End: 2020-09-06

## 2020-09-04 RX ORDER — LACTULOSE 10 G/15ML
10 SOLUTION ORAL
Refills: 0 | Status: DISCONTINUED | OUTPATIENT
Start: 2020-09-04 | End: 2020-09-10

## 2020-09-04 RX ADMIN — Medication 1 GRAM(S): at 06:01

## 2020-09-04 RX ADMIN — ENOXAPARIN SODIUM 90 MILLIGRAM(S): 100 INJECTION SUBCUTANEOUS at 13:59

## 2020-09-04 RX ADMIN — IRON SUCROSE 110 MILLIGRAM(S): 20 INJECTION, SOLUTION INTRAVENOUS at 13:37

## 2020-09-04 RX ADMIN — PANTOPRAZOLE SODIUM 40 MILLIGRAM(S): 20 TABLET, DELAYED RELEASE ORAL at 06:02

## 2020-09-04 RX ADMIN — Medication 50 MICROGRAM(S): at 06:02

## 2020-09-04 RX ADMIN — POLYETHYLENE GLYCOL 3350 17 GRAM(S): 17 POWDER, FOR SOLUTION ORAL at 18:10

## 2020-09-04 RX ADMIN — PANTOPRAZOLE SODIUM 40 MILLIGRAM(S): 20 TABLET, DELAYED RELEASE ORAL at 18:11

## 2020-09-04 RX ADMIN — Medication 1 GRAM(S): at 18:11

## 2020-09-04 RX ADMIN — Medication 650 MILLIGRAM(S): at 13:57

## 2020-09-04 RX ADMIN — ENOXAPARIN SODIUM 90 MILLIGRAM(S): 100 INJECTION SUBCUTANEOUS at 18:12

## 2020-09-04 RX ADMIN — CHLORHEXIDINE GLUCONATE 1 APPLICATION(S): 213 SOLUTION TOPICAL at 06:01

## 2020-09-04 RX ADMIN — CEFTRIAXONE 100 MILLIGRAM(S): 500 INJECTION, POWDER, FOR SOLUTION INTRAMUSCULAR; INTRAVENOUS at 02:23

## 2020-09-04 NOTE — CONSULT NOTE ADULT - ATTENDING COMMENTS
64yFemale with a PMH of hypertension, peptic ulcer disease, hypothyroidism, hx sciatica pain with previous injections in the L hip admitted to hospital for submassive PE s/p thrombectomy of 4 clots. Patient complaining of left hip pain, underwent MRI revealing extensive necrosis of femoral head and acetabulum and joint effusion, now s/p image guided arthrocentesis by IR revealing purulent fluid high in RBCs w moderate WBCs    #L hip ?hemarthrosis, per IR note, purulence drained however does not meet WBC criteria for septic arthritis  +SIRS criteria, T102.2 P106 possibly secondary to PE vs bleed vs ruling out infection    s/p L hip drainage 9/4 " approx 5cc thick purulent fluid "  < from: MR Hip w/wo IV Cont, Left (09.02.20 @ 19:42) >  1. 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 Associated stress related sclerosis/edema in the left femoral neck and left acetabulum. Extensive surrounding edema/inflammation. Given these findings, septic arthritis cannot be excluded.  2. Large heterogeneous joint effusion may be due to internal hemorrhage.    Total Nucleated Cell Count, Body Fluid: 3551:  (09.04.20 @ 10:30)    Total RBC Count: 114,000 /uL (09.04.20 @ 10:30)    Fluid Segmented Granulocytes: 83 % (09.04.20 @ 10:30)    Hx injections in the hip    Necrosis of the fem head  #PE s/p thrombectomy  #Hyponatremia    RECOMMEND  - Ok to continue ceftriaxone 2g q24h iv while awaiting synovial fluid cx , as aspirate described as purulence  - f/u hip cx, bcx   - malignancy workup  - obtain outpatient MRI     Please call with any questions or send a message on [employer] Teams  Spectra [phone]
Scheduled for hip aspiration in AM.  Can give evening dose of Lovenox. Please hold AM dose.
63 yo f with PUD with PE  Risk benefit ratios favor treatment of PE with anticoagulation  Please see above for recommendations regarding PUD

## 2020-09-04 NOTE — PROGRESS NOTE ADULT - ASSESSMENT
ASSESSMENT  The patient is a 64 year-old female with a PMH of HTN and hypothyroidism and a PSH of appendectomy and tonsillectomy presenting for shortness of breath, admitted for pulmonary embolism s/p IR thrombectomy, L hip diagnostic arthrocentesis with IR today for soft tissue mass/femoral head necrosis seen on MRI awaiting culture of joint aspirate for further management.     PLAN    # High grade fever d/t septic arthritis vs. UTI   - Urine Cx results: E. Coli  - c/w IV Rocephin  - Tylenol PRN for fever  - f/u blood Cx   - f/u joint fluids analysis (IR consulted for arthrocentesis)   - ID consult     # Submassive PE l/t acute respiratory failure   - s/p thrombectomy by IR on 8/31  - pt is off oxygen and comfortable at rest now on RA  - c/w Lovenox 90 mg q12 hours post-arthrocentesis  - likely Eliquis on d/c   - monitor pulse Ox   - start Lidoderm patch for pleuritic chest pain     # Acute diastolic CHF/ PHTN  - c/w Fluid restriction 1500 ml in 24 hours, daily weights  - low sodium diet, intake and output monitoring   - pt is clinically improving after thrombectomy   - troponin WNL today   - d/c telemetry   - monitor for fluid overload     # PUD Gastric ulcer at pylorus/ Gastritis   - pt has recent EGD , report is in the paper chart  - biopsy was negative for H.Pylori / dysplasia    - IV PPIs for now   - Carafate 1 gm Q 12 hour   - avoid NSAIDS   - No evidence of active bleed  - pt was consulted by GI, recommendations noted     # Iron deficiency anemia  - started IV iron infusions for 3 days   - no active bleeding noted   - monitor H/H, keep Hb above 7.5 and active type/cross  - H/H currently 8.7/29.8    # H/o hypertension  - DASH diet   - resume Lisinopril     # Inability to walk/ spinal stenosis/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     #Progress Note Handoff  Pending (specify): hold Lovenox tonight and tomorrow morning, pt'll go for IR guided arthrocentesis in AM , start IV Venofer, ID consult, f/u urine Cx, Pr/Rehab evaluation   Family discussion: I spoke with pt and her friend, she agreed with a plan of care   Disposition: Home_ __/SNF___/Other________/Unknown at this time________

## 2020-09-04 NOTE — PROGRESS NOTE ADULT - ASSESSMENT
ASSESSMENT AND PLAN:    The patient is a 64 year-old female with a PMH of HTN and hypothyroidism and a PSH of appendectomy and tonsillectomy presenting for shortness of breath, admitted for pulmonary embolism.    # High grade fever/ r/o septic arthritis/ UTI   - c/w Ceftriaxone, ID consulted appreciated   - f/u joint fluid Cx   - f/u urine Cx and blood Cx   - Tylenol PRN for fever    # Acute respiratory failure due to  submassive acute pulmonary embolism  - s/p thrombectomy by  IR on 8/31  - pt is off oxygen and comfortable at rest now on RA  - c/w  Lovenox 90 mg Q 12 hours, will consider Eliquis on d/c   - monitor pulse Ox   - start Lidoderm patch for pleuritic chest pain     # Acute diastolic CHF/ PHTN  - c/w  Fluid restriction 1500 ml in 24 hours, daily weight  - low sodium diet, intake and output monitoring   - pt is clinically improving after thrombectomy   - troponin WNL   - no need for  telemetry monitoring   - monitor for fluid overload       #  PUD Gastric ulcer at pylorus/ Gastritis   - pt has recent EGD , report is in the paper chart  - biopsy was negative for H.Pylori / dysplasia    - IV PPIs for now   - Carafate 1 gm Q 12 hour   - avoid NSAIDS   - No evidence of active bleed  - pt was consulted by GI, recommendations noted     # Iron deficiency anemia  -start IV iron infusions for 3 days   - no active bleeding noted   - monitor H/H, keep Hb above 7.5 and active type/cross      # H/o hypertension  - DASH diet   - resume Lisinopril     # Inability to walk/ spinal stenosis/ degenerative Dz in L-spine/ Left hip pain   - MRI findings noted , case d/w ortho attending  - s/p  arthrocentesis by IR today   - pain control  - PT/rehab evaluation   - fall precautions   - OOB to chair     #Progress Note Handoff  Pending (specify): resume Lovenox, f/u blood, urine and synovial fluid Cx , treat constipation and pain, PT/Rehab   Family discussion: I spoke with pt, she agreed with a plan of care   Disposition: Home_x __/SNF___/Other________/Unknown at this time________

## 2020-09-04 NOTE — PROGRESS NOTE ADULT - SUBJECTIVE AND OBJECTIVE BOX
HPI  The patient is a 64 year-old female with a PMH of HTN and hypothyroidism and a PSH of appendectomy and tonsillectomy presenting for shortness of breath. The patient reports that on Thursday prior to admission that she began experiencing shortness of breath on minimal ambulation associated w/ weakness and dizziness; she denied associated chest pain or pressure; as the shortness of breath worsened she presented to the ED.    Of note, about 1 week prior to admission she started having pain in the left hip; had an MRI done which showed spinal stenosis at L3-L4 and L4-L5; was given a "steroid shot" which did not help the pain. Around the same time the patient began complaining of nausea; started to have non-bloody non-bilious vomiting; had an EGD in [place] (records faxed to us, in chart) which revealed thickening of the gastric pylorus with a deep ulcer suspicious for perforation but with no free air; she was started on a PPI BID which she reports alleviated the nausea and vomiting.     In the ED, T 97.6 F, , /94, RR 19; O2 sat 94%. CT angio chest revealed bilateral central pulmonary embolism extending into subsegmental branches w/ evidence of right heart strain. She was started on a heparin gtt; IR was c/s and performed thrombectomy.     Currently admitted to medicine with the primary diagnosis of Pulmonary embolism. Today is hospital day 4d.     INTERVAL HPI:  Patient was examined and seen at bedside. This morning she is resting comfortably in bed and reports no new issues or overnight events. Patient's MRI to assess soft tissue mass on prior CT came back negative for any suspicious malignancy but positive for necrosis of femoral head possibly 2/2 to septic arthritis.     Biopsy/aspiration completed with IR today. Given recent fever, patient was started on Rocephin as well as Venefir for low iron. Urine culture positive for E.coli - will continue Rocephin.     ROS: Otherwise unremarkable     PAST MEDICAL & SURGICAL HISTORY  HTN (hypertension)  Gastric ulcer  S/P tonsillectomy  S/P appendectomy    ALLERGIES  No Known Allergies    MEDICATIONS  STANDING MEDICATIONS  cefTRIAXone   IVPB 2000 milliGRAM(s) IV Intermittent every 24 hours  chlorhexidine 4% Liquid 1 Application(s) Topical <User Schedule>  enoxaparin Injectable 90 milliGRAM(s) SubCutaneous every 12 hours  iron sucrose IVPB 200 milliGRAM(s) IV Intermittent every 24 hours  levothyroxine 50 MICROGram(s) Oral daily  lidocaine   Patch 1 Patch Transdermal once  lisinopril 5 milliGRAM(s) Oral daily  pantoprazole  Injectable 40 milliGRAM(s) IV Push two times a day  polyethylene glycol 3350 17 Gram(s) Oral daily  sucralfate 1 Gram(s) Oral every 12 hours    PRN MEDICATIONS  acetaminophen   Tablet .. 650 milliGRAM(s) Oral every 6 hours PRN  bisacodyl 5 milliGRAM(s) Oral every 12 hours PRN  lactulose Syrup 10 Gram(s) Oral two times a day PRN  oxycodone    5 mG/acetaminophen 325 mG 1 Tablet(s) Oral every 6 hours PRN    VITALS:  T(F): 101.4  HR: 101  BP: 138/79  RR: 18  SpO2: 97%    PHYSICAL EXAM  GEN: NAD, Resting comfortably in bed  PULM: Clear to auscultation bilaterally, No wheezes  CVS: Regular rate and rhythm, S1-S2, no murmurs  ABD: Soft, non-tender, non-distended, no guarding  EXT: No edema  NEURO: A&Ox3, no focal deficits    LABS                        8.7    10.90 )-----------( 263      ( 04 Sep 2020 11:40 )             26.5     09-04    135  |  102  |  12  ----------------------------<  99  4.1   |  22  |  0.7    Ca    8.1<L>      04 Sep 2020 04:30  Phos  3.1     09-04  Mg     1.8     09-04      PT/INR - ( 04 Sep 2020 04:30 )   PT: 15.10 sec;   INR: 1.31 ratio    PTT - ( 04 Sep 2020 04:30 )  PTT:25.4 sec  Urinalysis Basic - ( 03 Sep 2020 01:30 )    Color: Yellow / Appearance: Slightly Turbid / S.031 / pH: x  Gluc: x / Ketone: Trace  / Bili: Negative / Urobili: <2 mg/dL   Blood: x / Protein: 30 mg/dL / Nitrite: Positive   Leuk Esterase: Moderate / RBC: 7 /HPF / WBC 34 /HPF   Sq Epi: x / Non Sq Epi: 1 /HPF / Bacteria: Many        Culture - Urine (collected 03 Sep 2020 01:30)  Source: .Urine Clean Catch (Midstream)  Preliminary Report (04 Sep 2020 16:40):    >100,000 CFU/ml Gram Negative Rods      CARDIAC MARKERS ( 03 Sep 2020 06:52 )  x     / 0.01 ng/mL / x     / x     / x          RADIOLOGY  MRI L hip   1. 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 Associated stress related sclerosis/edema in the left femoral neck and left acetabulum. Extensive surrounding edema/inflammation. Given these findings, septic arthritis cannot be excluded.  2. Large heterogeneous joint effusion may be due to internal hemorrhage.  3. Apparent tendon tears as above.

## 2020-09-04 NOTE — PROGRESS NOTE ADULT - SUBJECTIVE AND OBJECTIVE BOX
INTERVENTIONAL RADIOLOGY BRIEF-OPERATIVE NOTE    Procedure: Left hip aspiration    Pre-Op Diagnosis: septic arthiritis    Post-Op Diagnosis: same    Attending: Alex  Resident: Greg    Anesthesia (type):  [ ] General Anesthesia  [ ] Sedation  [ ] Spinal Anesthesia  [ x] Local/Regional    Contrast: None    Estimated Blood Loss: Minimal, < 5 cc    Condition:   [ ] Critical  [ ] Serious  [ ] Fair   [ c] Good    Findings/Follow up Plan of Care: S/p image guided left hip aspiration, obtaining approx 5cc thick purulent fluid using a 5Fr Yueh needle. Specimen given to primary team resident. Pt tolerated the procedure well.    Specimens Removed: as above    Implants: none    Complications: none immediate    Disposition: transfer to floor      Please call Interventional Radiology [phone] with any questions, concerns, or issues.

## 2020-09-04 NOTE — PROGRESS NOTE ADULT - SUBJECTIVE AND OBJECTIVE BOX
Vascular & Interventional Radiology Pre-Procedure Note    Procedure Name: image guided left hip aspiration    HPI: HPI:  The patient is a 64 year-old female with a PMH of HTN and hypothyroidism and a PSH of appendectomy and tonsillectomy presenting for shortness of breath. The patient reports that on Thursday she began experiencing shortness of breath on minimal ambulation associated w/ weakness and dizziness; she denied associated chest pain or pressure; as the shortness of breath worsened she presented to the ED today.    Of note, about 1 week ago she started having pain in the left hip; had an MRI done which showed spinal stenosis at L3-L4 and L4-L5; was given a "steroid shot" which did not help the pain. Around the same time the patient began complaining of nausea; started to have non-bloody non-bilious vomiting; had an EGD in [place] (records faxed to us, in chart) which revealed thickening of the gastric pylorus with a deep ulcer suspicious for perforation but with no free air; she was started on a PPI BID which she reports alleviated the nausea and vomiting.     In the ED, T 97.6 F, , /94, RR 19; O2 sat 94%. CT angio chest revealed bilateral central pulmonary embolism extending into subsegmental branches w/ evidence of right heart strain. She was started on a heparin gtt; IR was c/s and are planning to do a thrombectomy tomorrow in the AM. The patient reports no recent history of trauma or any history of blood clotting disorders either in herself or in her family. pert called started on heparin and protonix drip hb stable (30 Aug 2020 22:24)      Allergies:   Medications (Abx/Cardiac/Anticoagulation/Blood Products)    cefTRIAXone   IVPB: 100 mL/Hr IV Intermittent (09-04 @ 02:23)  cefTRIAXone   IVPB: 100 mL/Hr IV Intermittent (09-03 @ 12:28)  enoxaparin Injectable: 90 milliGRAM(s) SubCutaneous (09-02 @ 15:40)  enoxaparin Injectable: 90 milliGRAM(s) SubCutaneous (09-03 @ 08:11)    Data:    T(C): 37.3  HR: 95  BP: 128/71  RR: 16  SpO2: 96%    Exam  General: NAD, AAO x3, no distress  Chest: breathing comfortably on room air, CTAB  Abdomen: soft, non-tender, non- distended   Extremities: positive pulses bilaterally x4    Radiology & Additional Studies: Radiology imaging reviewed.     Labs:   -WBC 11.69 / HgB 9.6 / Hct 29.4 / Plt 255  -Na 135 / Cl 102 / BUN 12 / Glucose 99  -K 4.1 / CO2 22 / Cr 0.7  -ALT -- / Alk Phos -- / T.Bili --  -INR1.31      EKG completed (date):     Height/Weight: Daily     Daily     Consentable: [ ] Yes   [ ] No     Plan:   -64y Female presents for left hip aspiration  -Risks/Benefits/alternatives explained with the patient and/or healthcare proxy and witnessed informed consent obtained.

## 2020-09-04 NOTE — CONSULT NOTE ADULT - ASSESSMENT
ASSESSMENT  64yFemale with a PMH of hypertension, peptic ulcer disease, hypothyroidism admitted to hospital for submassive PE s/p thrombectomy of 4 clots. Patient complaining of left hip pain, underwent MRI revealing extensive necrosis of femoral head and acetabulum and joint effusion, now s/p image guided arthrocentesis by IR revealing purulent fluid high in RBCs w moderate WBCs. Patient currently on ceftriaxone 2000mg IV q24hr. WBC trending down since admission, febrile to 102.2 yesterday.    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

## 2020-09-04 NOTE — PHYSICAL THERAPY INITIAL EVALUATION ADULT - SPECIFY REASON(S)
9:30-Chart reviewed. Pt scheduled for L Hip aspiration with IR this AM. PT to hold IE until cleared after procedure.

## 2020-09-04 NOTE — PROGRESS NOTE ADULT - SUBJECTIVE AND OBJECTIVE BOX
64 year-old female with a PMH of PUD, HTN and hypothyroidism and a PSH of appendectomy and tonsillectomy presenting for shortness of breath, weakness and dizziness. The patient was found to have submassive pulmonary embolism and underwent thrombectomy on 8/31/20 with IR. The patient was anticoagulated with Heparin ggt. She was followed by GI for PUD.  Pt was downgraded to telemetry from ICU on 9/1/20. While in the hospital pt was diagnosed with iron deficiency amenia.   MRI of the left hip showed significant joint effusion, case discussed with ortho attending, IR consulted , pt underwent arthrocentesis today .  Pt feels better, she is c/w weakness and left hip pain at times, difficulty ambulating.        PAST MEDICAL & SURGICAL HISTORY  HTN (hypertension)  Gastric ulcer  S/P tonsillectomy  S/P appendectomy      ALLERGIES:  No Known Allergies    Vital Signs Last 24 Hrs  T(C): 38.6 (04 Sep 2020 12:54), Max: 39 (03 Sep 2020 20:25)  T(F): 101.4 (04 Sep 2020 12:54), Max: 102.2 (03 Sep 2020 20:25)  HR: 101 (04 Sep 2020 12:54) (95 - 106)  BP: 138/79 (04 Sep 2020 12:54) (128/71 - 151/74)  BP(mean): --  RR: 18 (04 Sep 2020 12:54) (16 - 18)  SpO2: 96% (04 Sep 2020 06:20) (96% - 96%)    PHYSICAL EXAM:  GEN: No acute distress  HEENT: normocephalic, atraumatic  LUNGS: decreased breath sounds at bases   HEART: S1/S2 present. RRR, no murmurs  ABD: Soft, non-tender, non-distended. Bowel sounds present  EXT: NC/NC/NE/2+PP/FRANCES, decreased ROM in LLE due to pain   NEURO: AAOX3, normal affect, no focal neuro deficit       LABS:                                               8.7    10.90 )-----------( 263      ( 04 Sep 2020 11:40 )             26.5   09-04    135  |  102  |  12  ----------------------------<  99  4.1   |  22  |  0.7    Ca    8.1<L>      04 Sep 2020 04:30  Phos  3.1     09-04  Mg     1.8     09-04            RADIOLOGY:  < from: [facility] Duplex Lower Ext Vein Scan, Bilat (08.31.20 @ 17:39) >  Impression:    No evidence of deep venous thrombosis or superficial thrombophlebitis in the right lower extremity.    Acute left popliteal, posterior tibial peroneal vein DVT    < end of copied text >    < from: IR Procedure (08.31.20 @ 12:48) >  FINDINGS:    1. Preliminary ultrasound demonstrates patent right common femoral vein.    2. Right pulmonary artery angiogram: Extensive filling defects within the right main pulmonary artery extending into the upper and lower lobes.    3. Left pulmonary artery angiogram: Small filling defects within the left lower lobe pulmonary arterial segmental branches with oligemia.    4. Successful mechanical suction thrombectomy in right main pulmonary artery as well as left lower lobe segmental pulmonary artery utilizing Inari suction thrombectomy catheter.    < end of copied text >  < from: Transthoracic Echocardiogram (08.31.20 @ 13:18) >  Summary:   1. Normal global left ventricular systolic function.   2. LV Ejection Fraction by Hardin's Method with a biplane EF of 59 %.   3. Mild left ventricular hypertrophy.   4. Mildly increased LV wall thickness.   5. Spectral Doppler shows impaired relaxation pattern of left ventricular myocardial filling (Grade I diastolic dysfunction).   6. Normal left atrial size.   7. Normal right atrial size.   8. Structurally normal mitral valve, with normal leaflet excursion.   9. Trace mitral valve regurgitation.  10. Mild-moderate tricuspid regurgitation.  11. Sclerotic aortic valve with normal opening.  12. Mild pulmonic valve regurgitation.  13. Estimated pulmonary artery systolic pressure is 47.5 mmHg assuming a right atrial pressure of 5 mmHg, which is consistent with mild pulmonary hypertension.  14. LA volume Index is 14.6 ml/m² ml/m2.    < from: MR Hip w/wo IV Cont, Left (09.02.20 @ 19:42) >  IMPRESSION:    1. 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 Associated stress related sclerosis/edema in the left femoral neck and left acetabulum. Extensive surrounding edema/inflammation. Given these findings, septic arthritis cannot be excluded.  2. Large heterogeneous joint effusion may be due to internal hemorrhage.  3. Apparent tendon tears as above.    < end of copied text >    S/p image guided left hip aspiration, obtaining approx 5cc thick purulent fluid using a 5Fr Yueh needle. Specimen given to primary team resident.    MEDICATIONS  (STANDING):  cefTRIAXone   IVPB 2000 milliGRAM(s) IV Intermittent every 24 hours  chlorhexidine 4% Liquid 1 Application(s) Topical <User Schedule>  enoxaparin Injectable 90 milliGRAM(s) SubCutaneous every 12 hours  iron sucrose IVPB 200 milliGRAM(s) IV Intermittent every 24 hours  levothyroxine 50 MICROGram(s) Oral daily  lidocaine   Patch 1 Patch Transdermal once  pantoprazole  Injectable 40 milliGRAM(s) IV Push two times a day  sucralfate 1 Gram(s) Oral every 12 hours    MEDICATIONS  (PRN):  acetaminophen   Tablet .. 650 milliGRAM(s) Oral every 6 hours PRN Temp greater or equal to 38.5C (101.3F)  bisacodyl 5 milliGRAM(s) Oral every 12 hours PRN Constipation  oxycodone    5 mG/acetaminophen 325 mG 1 Tablet(s) Oral every 6 hours PRN Severe Pain (7 - 10)

## 2020-09-04 NOTE — CONSULT NOTE ADULT - SUBJECTIVE AND OBJECTIVE BOX
IOANA POTTER  64y, Female  Allergy: No Known Allergies      CHIEF COMPLAINT: pulmonary embolism (04 Sep 2020 10:00)      HPI:  64yFemale with a past medical history of htn, pud, and hypothyroidism presented for SOB, weakness, and dizziness. Patient found to have submassive PE, taken to IR for thrombectomy () of 4 clots, anticoagulated w heparin ggt. 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 Patient currently afebrile, but was febrile to 102.2 yesterday, WBC count 17K on admission, currently 11K. Patient went for arthrocentesis of joint earlier today by IR, drained 5cc of purulent fluid found to have [id number] RBCs and 3351 nucleated cells. Patient has limited ROM due to pain and is having trouble bearing weight on the leg. Patient currently hemodynamically stable, not complaining of any SOB or chest pain. Patient denying any hematuria, dysuria, suprapubic pain, no CVA tenderness.      Infectious Diseases History:  Old Micro Data/Cultures: Nothing significant to note    FAMILY HISTORY:  Family history of early CAD: son    PAST MEDICAL & SURGICAL HISTORY:  HTN (hypertension)  Gastric ulcer  S/P tonsillectomy  S/P appendectomy    SOCIAL HISTORY  Social History:  The patient denies any history of tobacco use; drinks 1/2 glass of wine weekly; denies illicit drug use. (30 Aug 2020 22:24)    Recent Travel:  Other Exposures:     ROS  General: Denies rigors, nightsweats  HEENT: Denies headache, rhinorrhea, sore throat, eye pain  CV: Denies CP, palpitations  PULM: Denies wheezing, hemoptysis  GI: Denies hematemesis, hematochezia, melena  : Denies discharge, hematuria  MSK: Denies arthralgias, myalgias  SKIN: Denies rash, lesions  NEURO: Denies paresthesias, weakness  PSYCH: Denies depression, anxiety    VITALS:  T(F): 101.4, Max: 102.2 (20 @ 20:25)  HR: 101  BP: 138/79  RR: 18Vital Signs Last 24 Hrs  T(C): 38.6 (04 Sep 2020 12:54), Max: 39 (03 Sep 2020 20:25)  T(F): 101.4 (04 Sep 2020 12:54), Max: 102.2 (03 Sep 2020 20:25)  HR: 101 (04 Sep 2020 12:54) (95 - 106)  BP: 138/79 (04 Sep 2020 12:54) (128/71 - 151/74)  BP(mean): --  RR: 18 (04 Sep 2020 12:54) (16 - 18)  SpO2: 96% (04 Sep 2020 06:20) (96% - 96%)    PHYSICAL EXAM:  Gen: NAD, resting in bed  HEENT: Normocephalic, atraumatic  Neck: supple, no lymphadenopathy  CV: Regular rate & regular rhythm  Lungs: CTAB  Abdomen: Soft, BS present  Ext: Warm, well perfused  Neuro: non focal, awake  Skin: no rash, no lesions  Lines: no phlebitis    TESTS & MEASUREMENTS:                        8.7    10.90 )-----------( 263      ( 04 Sep 2020 11:40 )             26.5     -04    135  |  102  |  12  ----------------------------<  99  4.1   |  22  |  0.7    Ca    8.1<L>      04 Sep 2020 04:30  Phos  3.1       Mg     1.8     04    eGFR if Non African American: 90 mL/min/1.73M2 (20 @ 04:30)  eGFR if African American: 105 mL/min/1.73M2 (20 @ 04:30)  Urinalysis Basic - ( 03 Sep 2020 01:30 )    Color: Yellow / Appearance: Slightly Turbid / S.031 / pH: x  Gluc: x / Ketone: Trace  / Bili: Negative / Urobili: <2 mg/dL   Blood: x / Protein: 30 mg/dL / Nitrite: Positive   Leuk Esterase: Moderate / RBC: 7 /HPF / WBC 34 /HPF   Sq Epi: x / Non Sq Epi: 1 /HPF / Bacteria: Many    Blood Gas Venous - Lactate: 1.4 mmoL/L (20 @ 17:00)    INFECTIOUS DISEASES TESTING    RADIOLOGY & ADDITIONAL TESTS:  I have personally reviewed the last available Chest xray  CXR    CT    CARDIOLOGY TESTING  Transthoracic Echocardiogram:    EXAM:  2-D ECHO (TTE) COMPLETE      PROCEDURE DATE:  2020    INTERPRETATION:  REPORT:  TRANSTHORACIC ECHOCARDIOGRAM REPORT    Patient Name:   IOANA POTTER Accession #: [id number]  Medical Rec #:  [id number]         Height:      63.0 in 160.0 cm  YOB: 1955        Weight:      185.0 lb 83.91 kg  Patient Age:    64 years          BSA:         1.87 m²  Patient Gender: F                 BP:          116/66 mmHg    Date of Exam:        2020 1:18:07 PM  Referring Physician: [id number] IRINA NAILS  Sonographer:         Felicia Griffin  Reading Physician:   Livier Colvin M.D.    Procedure:   2D Echo/Doppler/Color Doppler Complete.  Indications: R07.9 - Chest Pain, unspecified  Diagnosis:   Chest pain, unspecified - R07.9    Summary:   1. Normal global left ventricular systolic function.   2. LV Ejection Fraction by Hardin's Method with a biplane EF of 59 %.   3. Mild left ventricular hypertrophy.   4. Mildly increased LV wall thickness.   5. Spectral Doppler shows impaired relaxation pattern of left ventricular myocardial filling (Grade I diastolic dysfunction).   6. Normal left atrial size.   7. Normal right atrial size.   8. Structurally normal mitral valve, with normal leaflet excursion.   9. Trace mitral valve regurgitation.  10. Mild-moderate tricuspid regurgitation.  11. Sclerotic aortic valve with normal opening.  12. Mild pulmonic valve regurgitation.  13. Estimated pulmonary artery systolic pressure is 47.5 mmHg assuming a right atrial pressure of 5 mmHg, which is consistent with mild pulmonary hypertension.  14. LA volume Index is 14.6 ml/m² ml/m2.    PHYSICIAN INTERPRETATION:  Left Ventricle: The left ventricular internal cavity size is normal. Left ventricular wall thicknessis mildly increased. There is mild left ventricular hypertrophy. Global LV systolic function was normal. Spectral Doppler shows impaired relaxation pattern of left ventricular myocardial filling (Grade I diastolic dysfunction). Normal LV filling pressures.  Right Ventricle: Normal right ventricular size and function.  Left Atrium: Normal left atrial size. LA volume Index is 14.6 ml/m² ml/m2.  Right Atrium: Normal right atrial size.  Pericardium: There is no evidence of pericardial effusion.  Mitral Valve: Structurally normal mitral valve, with normal leaflet excursion. Trace mitral valve regurgitation is seen.  Tricuspid Valve: Structurally normal tricuspid valve, with normal leaflet excursion. Mild-moderate tricuspid regurgitation is visualized. Estimated pulmonary artery systolic pressure is 47.5 mmHg assuming a right atrial pressure of 5 mmHg, which is consistent with mild pulmonary hypertension.  Aortic Valve: The aortic valve is trileaflet. Sclerotic aortic valve with normal opening.No evidence of aortic valve regurgitation is seen.  Pulmonic Valve: Structurally normal pulmonic valve, with normal leaflet excursion. Mild pulmonic valve regurgitation.  Aorta: The aortic root and ascending aorta are structurally normal, with no evidence of dilitation.  Pulmonary Artery: The main pulmonary artery is normal in size.  Venous: The inferior vena cava was normal sized, with respiratory size variation greater than 50%.      2D AND M-MODE MEASUREMENTS (normal ranges within parentheses):  Left                  Normal   Aorta/Left             Normal  Ventricle:                     Atrium:  IVSd (2D):  1.13 cm  (0.7-1.1) AoV Cusp       1.76  (1.5-2.6)  LVPWd (2D): 0.72 cm  (0.7-1.1) Separation:     cm  LVIDd (2D): 3.98 cm  (3.4-5.7) Left Atrium    2.70  (1.9-4.0)  LVIDs (2D): 2.75 cm            (Mmode):        cm  LV FS (2D):  30.9 %   (>25%)   LA Volume      14.5  Relative      0.36    (<0.42)  Index         ml/m²  Wall                           Right  Thickness   Ventricle:                                 RVd (2D):      3.19 cm    SPECTRAL DOPPLER ANALYSIS:  LV DIASTOLIC FUNCTION:  MV Peak E: 0.49 m/s Decel Time: 270 msec  MV Peak A: 0.90 m/s  E/A Ratio: 0.54    LVOT Vmax: 1.31 m/s  LVOT VTI:  0.18 m  LVOTDiam: 1.77 cm    Mitral Valve:  MV P1/2 Time: 78.40 msec  MV Area, PHT: 2.81 cm²    Tricuspid Valve and PA/RV Systolic Pressure: TR Max Velocity: 3.26 m/s RA Pressure: 5 mmHg RVSP/PASP: 47.5 mmHg    [id number] Livier Colvin M.D., Electronically signedon 2020 at 4:35:15 PM  *** Final ***      LIVIER COLVIN M.D., RESIDENT RADIOLOGIST  This document has been electronically signed.  LIVIER COLVIN M.D., RESIDENT RADIOLOGIST  This document has been electronically signed. Aug [id number]  1:18PM       (20 @ 13:18)  12 Lead ECG:   Ventricular Rate 98 BPM    Atrial Rate 98 BPM    P-R Interval 128 ms    QRS Duration 90 ms    Q-T Interval 356 ms    QTC Calculation(Bezet) 454 ms    P Axis 65 degrees    R Axis 65 degrees    T Axis 4 degrees    Diagnosis Line Normal sinus rhythm  Nonspecific T wave abnormality  Low voltage QRS chest leads  Abnormal ECG    Confirmed by LIVIER COLVIN MD ([id number]) on 2020 8:20:21 PM (20 @ 17:44)      All available historical records have been reviewed    MEDICATIONS  cefTRIAXone   IVPB 2000  chlorhexidine 4% Liquid 1  enoxaparin Injectable 90  iron sucrose IVPB 200  levothyroxine 50  lidocaine   Patch 1  pantoprazole  Injectable 40  sucralfate 1    ANTIBIOTICS:  cefTRIAXone   IVPB 2000 milliGRAM(s) IV Intermittent every 24 hours    All available historical data has been reviewed  ASSESSMENT  64yFemale with a PMH of hypertension, peptic ulcer disease, hypothyroidism admitted to hospital for submassive PE s/p thrombectomy of 4 clots. Patient complaining of left hip pain, underwent MRI revealing extensive necrosis of femoral head and acetabulum and joint effusion, now s/p image guided arthrocentesis by IR revealing purulent fluid high in RBCs w moderate WBCs. Patient currently on ceftriaxone 2000mg IV q24hr. WBC trending down since admission, febrile to 102.2 yesterday.    IMPRESSION  # (?) Sepsis on admission (2 or more of the following T<96.8F, T>101F, Pulse>90, Resp Rate>20, WBC>12, wbc<4, Bands>10%), PLUS (+) lactic acidosis, OR metabolic encephalopathy, OR RESHMA, OR bacteremia . 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 All final recommendations to follow pending discussion with ID Attending IOANA POTTER  64y, Female  Allergy: No Known Allergies      CHIEF COMPLAINT: pulmonary embolism (04 Sep 2020 10:00)      HPI:  64yFemale with a past medical history of htn, pud, and hypothyroidism presented for SOB, weakness, and dizziness. Patient found to have submassive PE, taken to IR for thrombectomy () of 4 clots, anticoagulated w heparin ggt. 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 Patient currently afebrile, but was febrile to 102.2 yesterday, WBC count 17K on admission, currently 11K. Patient went for arthrocentesis of joint earlier today by IR, drained 5cc of purulent fluid found to have 003359 RBCs and 3351 nucleated cells. Patient has limited ROM due to pain and is having trouble bearing weight on the leg. Patient currently hemodynamically stable, not complaining of any SOB or chest pain. Patient denying any hematuria, dysuria, suprapubic pain, no CVA tenderness.      Infectious Diseases History:  Old Micro Data/Cultures: Nothing significant to note    FAMILY HISTORY:  Family history of early CAD: son    PAST MEDICAL & SURGICAL HISTORY:  HTN (hypertension)  Gastric ulcer  S/P tonsillectomy  S/P appendectomy    SOCIAL HISTORY  Social History:  The patient denies any history of tobacco use; drinks 1/2 glass of wine weekly; denies illicit drug use. (30 Aug 2020 22:24)    Recent Travel:  Other Exposures:     ROS  General: Denies rigors, nightsweats  HEENT: Denies headache, rhinorrhea, sore throat, eye pain  CV: Denies CP, palpitations  PULM: Denies wheezing, hemoptysis  GI: Denies hematemesis, hematochezia, melena  : Denies discharge, hematuria  MSK: Denies arthralgias, myalgias  SKIN: Denies rash, lesions  NEURO: Denies paresthesias, weakness  PSYCH: Denies depression, anxiety    VITALS:  T(F): 101.4, Max: 102.2 (20 @ 20:25)  HR: 101  BP: 138/79  RR: 18Vital Signs Last 24 Hrs  T(C): 38.6 (04 Sep 2020 12:54), Max: 39 (03 Sep 2020 20:25)  T(F): 101.4 (04 Sep 2020 12:54), Max: 102.2 (03 Sep 2020 20:25)  HR: 101 (04 Sep 2020 12:54) (95 - 106)  BP: 138/79 (04 Sep 2020 12:54) (128/71 - 151/74)  BP(mean): --  RR: 18 (04 Sep 2020 12:54) (16 - 18)  SpO2: 96% (04 Sep 2020 06:20) (96% - 96%)    PHYSICAL EXAM:  Gen: NAD, resting in bed  HEENT: Normocephalic, atraumatic  Neck: supple, no lymphadenopathy  CV: Regular rate & regular rhythm  Lungs: CTAB  Abdomen: Soft, BS present  Ext: Warm, well perfused  Neuro: non focal, awake  Skin: no rash, no lesions  Lines: no phlebitis    TESTS & MEASUREMENTS:                        8.7    10.90 )-----------( 263      ( 04 Sep 2020 11:40 )             26.5     -04    135  |  102  |  12  ----------------------------<  99  4.1   |  22  |  0.7    Ca    8.1<L>      04 Sep 2020 04:30  Phos  3.1       Mg     1.8     04    eGFR if Non African American: 90 mL/min/1.73M2 (20 @ 04:30)  eGFR if African American: 105 mL/min/1.73M2 (20 @ 04:30)  Urinalysis Basic - ( 03 Sep 2020 01:30 )    Color: Yellow / Appearance: Slightly Turbid / S.031 / pH: x  Gluc: x / Ketone: Trace  / Bili: Negative / Urobili: <2 mg/dL   Blood: x / Protein: 30 mg/dL / Nitrite: Positive   Leuk Esterase: Moderate / RBC: 7 /HPF / WBC 34 /HPF   Sq Epi: x / Non Sq Epi: 1 /HPF / Bacteria: Many    Blood Gas Venous - Lactate: 1.4 mmoL/L (20 @ 17:00)    INFECTIOUS DISEASES TESTING    RADIOLOGY & ADDITIONAL TESTS:  I have personally reviewed the last available Chest xray  CXR    CT    CARDIOLOGY TESTING  Transthoracic Echocardiogram:    EXAM:  2-D ECHO (TTE) COMPLETE      PROCEDURE DATE:  2020    INTERPRETATION:  REPORT:  TRANSTHORACIC ECHOCARDIOGRAM REPORT    Patient Name:   IOANA POTTER Accession #: [id number]  Medical Rec #:  [id number]         Height:      63.0 in 160.0 cm  YOB: 1955        Weight:      185.0 lb 83.91 kg  Patient Age:    64 years          BSA:         1.87 m²  Patient Gender: F                 BP:          116/66 mmHg    Date of Exam:        2020 1:18:07 PM  Referring Physician: [id number] IRINA NAILS  Sonographer:         Felicia Griffin  Reading Physician:   Livier Colvin M.D.    Procedure:   2D Echo/Doppler/Color Doppler Complete.  Indications: R07.9 - Chest Pain, unspecified  Diagnosis:   Chest pain, unspecified - R07.9    Summary:   1. Normal global left ventricular systolic function.   2. LV Ejection Fraction by Hardin's Method with a biplane EF of 59 %.   3. Mild left ventricular hypertrophy.   4. Mildly increased LV wall thickness.   5. Spectral Doppler shows impaired relaxation pattern of left ventricular myocardial filling (Grade I diastolic dysfunction).   6. Normal left atrial size.   7. Normal right atrial size.   8. Structurally normal mitral valve, with normal leaflet excursion.   9. Trace mitral valve regurgitation.  10. Mild-moderate tricuspid regurgitation.  11. Sclerotic aortic valve with normal opening.  12. Mild pulmonic valve regurgitation.  13. Estimated pulmonary artery systolic pressure is 47.5 mmHg assuming a right atrial pressure of 5 mmHg, which is consistent with mild pulmonary hypertension.  14. LA volume Index is 14.6 ml/m² ml/m2.    PHYSICIAN INTERPRETATION:  Left Ventricle: The left ventricular internal cavity size is normal. Left ventricular wall thicknessis mildly increased. There is mild left ventricular hypertrophy. Global LV systolic function was normal. Spectral Doppler shows impaired relaxation pattern of left ventricular myocardial filling (Grade I diastolic dysfunction). Normal LV filling pressures.  Right Ventricle: Normal right ventricular size and function.  Left Atrium: Normal left atrial size. LA volume Index is 14.6 ml/m² ml/m2.  Right Atrium: Normal right atrial size.  Pericardium: There is no evidence of pericardial effusion.  Mitral Valve: Structurally normal mitral valve, with normal leaflet excursion. Trace mitral valve regurgitation is seen.  Tricuspid Valve: Structurally normal tricuspid valve, with normal leaflet excursion. Mild-moderate tricuspid regurgitation is visualized. Estimated pulmonary artery systolic pressure is 47.5 mmHg assuming a right atrial pressure of 5 mmHg, which is consistent with mild pulmonary hypertension.  Aortic Valve: The aortic valve is trileaflet. Sclerotic aortic valve with normal opening.No evidence of aortic valve regurgitation is seen.  Pulmonic Valve: Structurally normal pulmonic valve, with normal leaflet excursion. Mild pulmonic valve regurgitation.  Aorta: The aortic root and ascending aorta are structurally normal, with no evidence of dilitation.  Pulmonary Artery: The main pulmonary artery is normal in size.  Venous: The inferior vena cava was normal sized, with respiratory size variation greater than 50%.      2D AND M-MODE MEASUREMENTS (normal ranges within parentheses):  Left                  Normal   Aorta/Left             Normal  Ventricle:                     Atrium:  IVSd (2D):  1.13 cm  (0.7-1.1) AoV Cusp       1.76  (1.5-2.6)  LVPWd (2D): 0.72 cm  (0.7-1.1) Separation:     cm  LVIDd (2D): 3.98 cm  (3.4-5.7) Left Atrium    2.70  (1.9-4.0)  LVIDs (2D): 2.75 cm            (Mmode):        cm  LV FS (2D):  30.9 %   (>25%)   LA Volume      14.5  Relative      0.36    (<0.42)  Index         ml/m²  Wall                           Right  Thickness   Ventricle:                                 RVd (2D):      3.19 cm    SPECTRAL DOPPLER ANALYSIS:  LV DIASTOLIC FUNCTION:  MV Peak E: 0.49 m/s Decel Time: 270 msec  MV Peak A: 0.90 m/s  E/A Ratio: 0.54    LVOT Vmax: 1.31 m/s  LVOT VTI:  0.18 m  LVOTDiam: 1.77 cm    Mitral Valve:  MV P1/2 Time: 78.40 msec  MV Area, PHT: 2.81 cm²    Tricuspid Valve and PA/RV Systolic Pressure: TR Max Velocity: 3.26 m/s RA Pressure: 5 mmHg RVSP/PASP: 47.5 mmHg    [id number] Livier Colvin M.D., Electronically signedon 2020 at 4:35:15 PM  *** Final ***      LIVIER COLVIN M.D., RESIDENT RADIOLOGIST  This document has been electronically signed.  LIVIER COLVIN M.D., RESIDENT RADIOLOGIST  This document has been electronically signed. Aug [id number]  1:18PM       (20 @ 13:18)  12 Lead ECG:   Ventricular Rate 98 BPM    Atrial Rate 98 BPM    P-R Interval 128 ms    QRS Duration 90 ms    Q-T Interval 356 ms    QTC Calculation(Bezet) 454 ms    P Axis 65 degrees    R Axis 65 degrees    T Axis 4 degrees    Diagnosis Line Normal sinus rhythm  Nonspecific T wave abnormality  Low voltage QRS chest leads  Abnormal ECG    Confirmed by LIVIER COLVIN MD ([id number]) on 2020 8:20:21 PM (20 @ 17:44)      All available historical records have been reviewed    MEDICATIONS  cefTRIAXone   IVPB 2000  chlorhexidine 4% Liquid 1  enoxaparin Injectable 90  iron sucrose IVPB 200  levothyroxine 50  lidocaine   Patch 1  pantoprazole  Injectable 40  sucralfate 1    ANTIBIOTICS:  cefTRIAXone   IVPB 2000 milliGRAM(s) IV Intermittent every 24 hours    All available historical data has been reviewed

## 2020-09-04 NOTE — CONSULT NOTE ADULT - I HAVE PERSONALLY SEEN, EXAMINED, AND PARTICIPATED IN THE CARE OF THIS PATIENT.  I HAVE REVIEWED ALL PERTINENT CLINICAL INFORMATION, INCLUDING HISTORY, PHYSICAL EXAM, PLAN AND THE MEDICAL/PA/NP STUDENT’S NOTE AND AGREE EXCEPT AS NOTED.
BLEPHARITIS, OU: PRESCRIBE WARM COMPRESSES AND EYELID SCRUBS QD-BID, ARTIFICIAL TEARS BID-QID, THE DAILY INTAKE OF OMEGA-3 FATTY ACIDS. WILL CONSIDER LIPIFLOW TREATMENT NEXT VISIT IF NOT RESPONSIVE TO TREATMENT OR IF SYMPTOMS PERSIST. RETURN FOR FOLLOW-UP AS SCHEDULED. Statement Selected

## 2020-09-05 LAB
-  AMIKACIN: SIGNIFICANT CHANGE UP
-  AMOXICILLIN/CLAVULANIC ACID: SIGNIFICANT CHANGE UP
-  AMPICILLIN/SULBACTAM: SIGNIFICANT CHANGE UP
-  AMPICILLIN: SIGNIFICANT CHANGE UP
-  AZTREONAM: SIGNIFICANT CHANGE UP
-  CEFAZOLIN: SIGNIFICANT CHANGE UP
-  CEFEPIME: SIGNIFICANT CHANGE UP
-  CEFOXITIN: SIGNIFICANT CHANGE UP
-  CEFTRIAXONE: SIGNIFICANT CHANGE UP
-  CIPROFLOXACIN: SIGNIFICANT CHANGE UP
-  ERTAPENEM: SIGNIFICANT CHANGE UP
-  GENTAMICIN: SIGNIFICANT CHANGE UP
-  IMIPENEM: SIGNIFICANT CHANGE UP
-  LEVOFLOXACIN: SIGNIFICANT CHANGE UP
-  MEROPENEM: SIGNIFICANT CHANGE UP
-  NITROFURANTOIN: SIGNIFICANT CHANGE UP
-  PIPERACILLIN/TAZOBACTAM: SIGNIFICANT CHANGE UP
-  TIGECYCLINE: SIGNIFICANT CHANGE UP
-  TOBRAMYCIN: SIGNIFICANT CHANGE UP
-  TRIMETHOPRIM/SULFAMETHOXAZOLE: SIGNIFICANT CHANGE UP
ANION GAP SERPL CALC-SCNC: 10 MMOL/L — SIGNIFICANT CHANGE UP (ref 7–14)
APTT BLD: 32 SEC — SIGNIFICANT CHANGE UP (ref 27–39.2)
BASOPHILS # BLD AUTO: 0.02 K/UL — SIGNIFICANT CHANGE UP (ref 0–0.2)
BASOPHILS NFR BLD AUTO: 0.2 % — SIGNIFICANT CHANGE UP (ref 0–1)
BUN SERPL-MCNC: 13 MG/DL — SIGNIFICANT CHANGE UP (ref 10–20)
CALCIUM SERPL-MCNC: 8.3 MG/DL — LOW (ref 8.5–10.1)
CHLORIDE SERPL-SCNC: 105 MMOL/L — SIGNIFICANT CHANGE UP (ref 98–110)
CO2 SERPL-SCNC: 26 MMOL/L — SIGNIFICANT CHANGE UP (ref 17–32)
CREAT SERPL-MCNC: 0.7 MG/DL — SIGNIFICANT CHANGE UP (ref 0.7–1.5)
CULTURE RESULTS: SIGNIFICANT CHANGE UP
CULTURE RESULTS: SIGNIFICANT CHANGE UP
EOSINOPHIL # BLD AUTO: 0.12 K/UL — SIGNIFICANT CHANGE UP (ref 0–0.7)
EOSINOPHIL NFR BLD AUTO: 1.2 % — SIGNIFICANT CHANGE UP (ref 0–8)
GLUCOSE SERPL-MCNC: 96 MG/DL — SIGNIFICANT CHANGE UP (ref 70–99)
HCT VFR BLD CALC: 25.6 % — LOW (ref 37–47)
HGB BLD-MCNC: 8.2 G/DL — LOW (ref 12–16)
IMM GRANULOCYTES NFR BLD AUTO: 0.6 % — HIGH (ref 0.1–0.3)
INR BLD: 1.3 RATIO — SIGNIFICANT CHANGE UP (ref 0.65–1.3)
LYMPHOCYTES # BLD AUTO: 1.2 K/UL — SIGNIFICANT CHANGE UP (ref 1.2–3.4)
LYMPHOCYTES # BLD AUTO: 12.2 % — LOW (ref 20.5–51.1)
MAGNESIUM SERPL-MCNC: 1.8 MG/DL — SIGNIFICANT CHANGE UP (ref 1.8–2.4)
MCHC RBC-ENTMCNC: 29.2 PG — SIGNIFICANT CHANGE UP (ref 27–31)
MCHC RBC-ENTMCNC: 32 G/DL — SIGNIFICANT CHANGE UP (ref 32–37)
MCV RBC AUTO: 91.1 FL — SIGNIFICANT CHANGE UP (ref 81–99)
METHOD TYPE: SIGNIFICANT CHANGE UP
MONOCYTES # BLD AUTO: 0.85 K/UL — HIGH (ref 0.1–0.6)
MONOCYTES NFR BLD AUTO: 8.7 % — SIGNIFICANT CHANGE UP (ref 1.7–9.3)
MRSA PCR RESULT.: NEGATIVE — SIGNIFICANT CHANGE UP
NEUTROPHILS # BLD AUTO: 7.55 K/UL — HIGH (ref 1.4–6.5)
NEUTROPHILS NFR BLD AUTO: 77.1 % — HIGH (ref 42.2–75.2)
NRBC # BLD: 0 /100 WBCS — SIGNIFICANT CHANGE UP (ref 0–0)
ORGANISM # SPEC MICROSCOPIC CNT: SIGNIFICANT CHANGE UP
ORGANISM # SPEC MICROSCOPIC CNT: SIGNIFICANT CHANGE UP
PHOSPHATE SERPL-MCNC: 3.4 MG/DL — SIGNIFICANT CHANGE UP (ref 2.1–4.9)
PLATELET # BLD AUTO: 255 K/UL — SIGNIFICANT CHANGE UP (ref 130–400)
POTASSIUM SERPL-MCNC: 3.7 MMOL/L — SIGNIFICANT CHANGE UP (ref 3.5–5)
POTASSIUM SERPL-SCNC: 3.7 MMOL/L — SIGNIFICANT CHANGE UP (ref 3.5–5)
PROTHROM AB SERPL-ACNC: 14.9 SEC — HIGH (ref 9.95–12.87)
RBC # BLD: 2.81 M/UL — LOW (ref 4.2–5.4)
RBC # FLD: 12.7 % — SIGNIFICANT CHANGE UP (ref 11.5–14.5)
SODIUM SERPL-SCNC: 141 MMOL/L — SIGNIFICANT CHANGE UP (ref 135–146)
SPECIMEN SOURCE: SIGNIFICANT CHANGE UP
SPECIMEN SOURCE: SIGNIFICANT CHANGE UP
WBC # BLD: 9.8 K/UL — SIGNIFICANT CHANGE UP (ref 4.8–10.8)
WBC # FLD AUTO: 9.8 K/UL — SIGNIFICANT CHANGE UP (ref 4.8–10.8)

## 2020-09-05 PROCEDURE — 93010 ELECTROCARDIOGRAM REPORT: CPT

## 2020-09-05 PROCEDURE — 99233 SBSQ HOSP IP/OBS HIGH 50: CPT

## 2020-09-05 RX ORDER — LIDOCAINE 4 G/100G
1 CREAM TOPICAL DAILY
Refills: 0 | Status: DISCONTINUED | OUTPATIENT
Start: 2020-09-05 | End: 2020-09-17

## 2020-09-05 RX ADMIN — Medication 650 MILLIGRAM(S): at 04:04

## 2020-09-05 RX ADMIN — CHLORHEXIDINE GLUCONATE 1 APPLICATION(S): 213 SOLUTION TOPICAL at 06:08

## 2020-09-05 RX ADMIN — Medication 650 MILLIGRAM(S): at 16:27

## 2020-09-05 RX ADMIN — Medication 50 MICROGRAM(S): at 06:10

## 2020-09-05 RX ADMIN — ENOXAPARIN SODIUM 90 MILLIGRAM(S): 100 INJECTION SUBCUTANEOUS at 18:21

## 2020-09-05 RX ADMIN — LISINOPRIL 5 MILLIGRAM(S): 2.5 TABLET ORAL at 06:10

## 2020-09-05 RX ADMIN — Medication 1 GRAM(S): at 06:10

## 2020-09-05 RX ADMIN — LIDOCAINE 1 PATCH: 4 CREAM TOPICAL at 23:57

## 2020-09-05 RX ADMIN — Medication 650 MILLIGRAM(S): at 18:13

## 2020-09-05 RX ADMIN — Medication 1 GRAM(S): at 18:21

## 2020-09-05 RX ADMIN — PANTOPRAZOLE SODIUM 40 MILLIGRAM(S): 20 TABLET, DELAYED RELEASE ORAL at 06:10

## 2020-09-05 RX ADMIN — CEFTRIAXONE 100 MILLIGRAM(S): 500 INJECTION, POWDER, FOR SOLUTION INTRAMUSCULAR; INTRAVENOUS at 01:54

## 2020-09-05 RX ADMIN — Medication 650 MILLIGRAM(S): at 05:08

## 2020-09-05 RX ADMIN — POLYETHYLENE GLYCOL 3350 17 GRAM(S): 17 POWDER, FOR SOLUTION ORAL at 12:59

## 2020-09-05 RX ADMIN — PANTOPRAZOLE SODIUM 40 MILLIGRAM(S): 20 TABLET, DELAYED RELEASE ORAL at 18:20

## 2020-09-05 RX ADMIN — LIDOCAINE 1 PATCH: 4 CREAM TOPICAL at 14:00

## 2020-09-05 RX ADMIN — IRON SUCROSE 110 MILLIGRAM(S): 20 INJECTION, SOLUTION INTRAVENOUS at 13:59

## 2020-09-05 NOTE — PROGRESS NOTE ADULT - ASSESSMENT
ASSESSMENT  64yFemale with a PMH of hypertension, peptic ulcer disease, hypothyroidism admitted to hospital for submassive PE s/p thrombectomy of 4 clots. Patient complaining of left hip pain, underwent MRI revealing extensive necrosis of femoral head and acetabulum and joint effusion, now s/p image guided arthrocentesis by IR revealing purulent fluid high in RBCs w moderate WBCs. Patient currently on ceftriaxone 2000mg IV q24hr. WBC trending down since admission, febrile to 102.2 yesterday.    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 with surrounding edema/inflammation with extensive joint effusion  # Rule out septic arthritis s/p image guided arthrocentesis by IR revealing 5cc purulent fluid with high level of RBCs and moderate WBCs  - s/p IR guided drainage: 945156 RBCs and 3351 nucleated cells (83% neutrophils)  - Blood Cx 9/3 NG  - Hip Cx 9/4 NGTD    RECOMMENDATIONS  - please repeat blood cultures today as febrile last night  - please obtain MRSA nares  - follow-up hip cultures and blood cultures until final   - continue ceftriaxone 2000mg IV q24hrs- re evaluate after cultures return  - f/u outpatient workup for necrosis of femoral head and possible hypercoagulable state that lead to PE     This is a preliminary incomplete pended note, all final recommendations to follow after interview and examination of the patient.    Please call or message on [employer] Teams if with any questions.  Spectra [id number] ASSESSMENT  64yFemale with a PMH of hypertension, peptic ulcer disease, hypothyroidism admitted to hospital for submassive PE s/p thrombectomy of 4 clots. Patient complaining of left hip pain, underwent MRI revealing extensive necrosis of femoral head and acetabulum and joint effusion, now s/p image guided arthrocentesis by IR revealing purulent fluid high in RBCs w moderate WBCs. Patient currently on ceftriaxone 2000mg IV q24hr. WBC trending down since admission, febrile to 102.2 yesterday.    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 with surrounding edema/inflammation with extensive joint effusion  # Rule out septic arthritis s/p image guided arthrocentesis by IR revealing 5cc purulent fluid with high level of RBCs and moderate WBCs  - s/p IR guided drainage: [id number] RBCs and 3351 nucleated cells (83% neutrophils)  - Blood Cx 9/3 NG  - Hip Cx 9/4 NGTD    RECOMMENDATIONS  - please repeat blood cultures today as febrile last night  - follow-up hip cultures and blood cultures until final - so far NG  - continue ceftriaxone 2000mg IV q24hrs- re evaluate after cultures return  - f/u outpatient workup for necrosis of femoral head and possible hypercoagulable state that lead to PE     Please call or message on [employer] Teams if with any questions.  Spectra [phone]

## 2020-09-05 NOTE — PROGRESS NOTE ADULT - ASSESSMENT
ASSESSMENT AND PLAN:    The patient is a 64 year-old female with a PMH of HTN and hypothyroidism and a PSH of appendectomy and tonsillectomy presenting for shortness of breath, admitted for pulmonary embolism.    # High grade fever/ suspected  septic arthritis/ UTI   - c/w Ceftriaxone, ID consulted appreciated   - f/u joint fluid Cx   - f/u urine Cx positive for E.coli  - joint fluid Cx is still pending   - send repeat blood Cx   - Tylenol PRN for fever  - fever might be also due to acute DVT and PE     # Acute respiratory failure due to  submassive acute pulmonary embolism  - resolved   - s/p thrombectomy by  IR on 8/31  - pt is off oxygen and comfortable at rest now on RA  - c/w  Lovenox 90 mg Q 12 hours, will d/c on  Eliquis   - monitor pulse Ox   -  Lidoderm patch for pleuritic chest pain     # Acute diastolic CHF/ PHTN  - c/w  Fluid restriction 1500 ml in 24 hours, daily weight  - low sodium diet, intake and output monitoring   - pt is clinically improving after thrombectomy   - troponin WNL   - monitor for fluid overload       #  PUD Gastric ulcer at pylorus/ Gastritis   - pt has recent EGD , report is in the paper chart  - biopsy was negative for H.Pylori / dysplasia    - IV PPIs for now   - Carafate 1 gm Q 12 hour   - avoid NSAIDS   - No evidence of active bleed  - pt was consulted by GI, recommendations noted     # Iron deficiency anemia  - received IV Venofer while in the hospital   - no active bleeding noted   - monitor H/H, keep Hb above 7.5 and active type/cross      # H/o hypertension  - DASH diet   -on  Lisinopril     # Inability to walk/ spinal stenosis/ degenerative Dz in L-spine/ Left hip pain   - MRI findings noted , case d/w ortho attending  - s/p  arthrocentesis by IR   - pain control  - PT/rehab evaluation   - fall precautions   - OOB to chair     #Progress Note Handoff  Pending (specify): partial weight bearing on left hip, OOB to chair, MT/Rehab eval, f/u joint fluid Cx, send repeat blood Cx, d/c telemetry   Family discussion: I spoke with pt, she agreed with a plan of care   Disposition: Home_x __/SNF___/Other________/Unknown at this time________

## 2020-09-05 NOTE — PROGRESS NOTE ADULT - SUBJECTIVE AND OBJECTIVE BOX
64 year-old female with a PMH of PUD, HTN and hypothyroidism and a PSH of appendectomy and tonsillectomy presenting for shortness of breath, weakness and dizziness. The patient was found to have submassive pulmonary embolism and underwent thrombectomy on 8/31/20 with IR. The patient was anticoagulated with Heparin ggt. She was followed by GI for PUD.  Pt was downgraded to telemetry from ICU on 9/1/20. While in the hospital pt was diagnosed with iron deficiency amenia.   MRI of the left hip showed significant joint effusion, case discussed with ortho attending, IR consulted , pt underwent arthrocentesis. .  Pt feels OK today, c/o left hip pain at times and fever, has difficulty ambulating.       PAST MEDICAL & SURGICAL HISTORY  HTN (hypertension)  Gastric ulcer  S/P tonsillectomy  S/P appendectomy      ALLERGIES:  No Known Allergies    Vital Signs Last 24 Hrs  T(C): 37.1 (05 Sep 2020 13:05), Max: 38.5 (05 Sep 2020 03:54)  T(F): 98.7 (05 Sep 2020 13:05), Max: 101.3 (05 Sep 2020 03:54)  HR: 87 (05 Sep 2020 13:05) (87 - 109)  BP: 133/65 (05 Sep 2020 13:05) (132/68 - 139/67)  BP(mean): --  RR: 18 (05 Sep 2020 13:05) (18 - 18)  SpO2: --    PHYSICAL EXAM:  GEN: No acute distress  HEENT: normocephalic, atraumatic  LUNGS: decreased breath sounds at bases   HEART: S1/S2 present. RRR, no murmurs  ABD: Soft, non-tender, non-distended. Bowel sounds present  EXT: NC/NC/NE/2+PP/FRANCES, decreased ROM in LLE due to pain   NEURO: AAOX3, normal affect, no focal neuro deficit       LABS:                                   8.2    9.80  )-----------( 255      ( 05 Sep 2020 07:12 )             25.6   09-05        141  |  105  |  13  ----------------------------<  96  3.7   |  26  |  0.7    Ca    8.3<L>      05 Sep 2020 07:12  Phos  3.4     09-05  Mg     1.8     09-05    Culture - Body Fluid with Gram Stain (09.04.20 @ 10:30)    Gram Stain:   No polymorphonuclear cells seen  No organisms seen    Specimen Source: .Body Fluid Joint Fluid, Left Hip    Culture - Blood (09.03.20 @ 20:52)    Specimen Source: .Blood None    Culture Results:   No growth to date.    Culture - Urine (09.03.20 @ 01:30)    Specimen Source: .Urine Clean Catch (Midstream)    Culture Results:   >100,000 CFU/ml Escherichia coli      RADIOLOGY:  < from: [facility] Duplex Lower Ext Vein Scan, Bilat (08.31.20 @ 17:39) >  Impression:    No evidence of deep venous thrombosis or superficial thrombophlebitis in the right lower extremity.    Acute left popliteal, posterior tibial peroneal vein DVT    < end of copied text >    < from: IR Procedure (08.31.20 @ 12:48) >  FINDINGS:    1. Preliminary ultrasound demonstrates patent right common femoral vein.    2. Right pulmonary artery angiogram: Extensive filling defects within the right main pulmonary artery extending into the upper and lower lobes.    3. Left pulmonary artery angiogram: Small filling defects within the left lower lobe pulmonary arterial segmental branches with oligemia.    4. Successful mechanical suction thrombectomy in right main pulmonary artery as well as left lower lobe segmental pulmonary artery utilizing Inari suction thrombectomy catheter.    < end of copied text >  < from: Transthoracic Echocardiogram (08.31.20 @ 13:18) >  Summary:   1. Normal global left ventricular systolic function.   2. LV Ejection Fraction by Hardin's Method with a biplane EF of 59 %.   3. Mild left ventricular hypertrophy.   4. Mildly increased LV wall thickness.   5. Spectral Doppler shows impaired relaxation pattern of left ventricular myocardial filling (Grade I diastolic dysfunction).   6. Normal left atrial size.   7. Normal right atrial size.   8. Structurally normal mitral valve, with normal leaflet excursion.   9. Trace mitral valve regurgitation.  10. Mild-moderate tricuspid regurgitation.  11. Sclerotic aortic valve with normal opening.  12. Mild pulmonic valve regurgitation.  13. Estimated pulmonary artery systolic pressure is 47.5 mmHg assuming a right atrial pressure of 5 mmHg, which is consistent with mild pulmonary hypertension.  14. LA volume Index is 14.6 ml/m² ml/m2.    < from: MR Hip w/wo IV Cont, Left (09.02.20 @ 19:42) >  IMPRESSION:    1. 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 Associated stress related sclerosis/edema in the left femoral neck and left acetabulum. Extensive surrounding edema/inflammation. Given these findings, septic arthritis cannot be excluded.  2. Large heterogeneous joint effusion may be due to internal hemorrhage.  3. Apparent tendon tears as above.    < end of copied text >    S/p image guided left hip aspiration, obtaining approx 5cc thick purulent fluid using a 5Fr Yueh needle. Specimen given to primary team resident.    MEDICATIONS  (STANDING):  cefTRIAXone   IVPB 2000 milliGRAM(s) IV Intermittent every 24 hours  chlorhexidine 4% Liquid 1 Application(s) Topical <User Schedule>  enoxaparin Injectable 90 milliGRAM(s) SubCutaneous every 12 hours  levothyroxine 50 MICROGram(s) Oral daily  lidocaine   Patch 1 Patch Transdermal once  lisinopril 5 milliGRAM(s) Oral daily  pantoprazole  Injectable 40 milliGRAM(s) IV Push two times a day  polyethylene glycol 3350 17 Gram(s) Oral daily  sucralfate 1 Gram(s) Oral every 12 hours    MEDICATIONS  (PRN):  acetaminophen   Tablet .. 650 milliGRAM(s) Oral every 6 hours PRN Temp greater or equal to 38.5C (101.3F)  bisacodyl 5 milliGRAM(s) Oral every 12 hours PRN Constipation  lactulose Syrup 10 Gram(s) Oral two times a day PRN constipation  lidocaine   Patch 1 Patch Transdermal daily PRN chest pain  oxycodone    5 mG/acetaminophen 325 mG 1 Tablet(s) Oral every 6 hours PRN Severe Pain (7 - 10)

## 2020-09-05 NOTE — PROGRESS NOTE ADULT - SUBJECTIVE AND OBJECTIVE BOX
IOANA POTTER  64y, Female  Allergy: No Known Allergies      LOS  6d    CHIEF COMPLAINT: pulmonary embolism (04 Sep 2020 18:10)      INTERVAL EVENTS/HPI  - No acute events overnight  - febrile after IR guided procedure   - T(F): , Max: 101.4 (09-04-20 @ 12:54)  - Denies any worsening symptoms  - Tolerating medication  - WBC Count: 10.90 (09-04-20 @ 11:40)  WBC Count: 11.69 (09-03-20 @ 06:52)     - Creatinine, Serum: 0.7 (09-04-20 @ 04:30)       ROS  General: Denies rigors, nightsweats  HEENT: Denies headache, rhinorrhea, sore throat, eye pain  CV: Denies CP, palpitations  PULM: Denies wheezing, hemoptysis  GI: Denies hematemesis, hematochezia, melena  : Denies discharge, hematuria  MSK: Denies arthralgias, myalgias  SKIN: Denies rash, lesions  NEURO: Denies paresthesias, weakness  PSYCH: Denies depression, anxiety    VITALS:  T(F): 99.9, Max: 101.4 (09-04-20 @ 12:54)  HR: 92  BP: 132/68  RR: 18Vital Signs Last 24 Hrs  T(C): 37.7 (05 Sep 2020 05:03), Max: 38.6 (04 Sep 2020 12:54)  T(F): 99.9 (05 Sep 2020 05:03), Max: 101.4 (04 Sep 2020 12:54)  HR: 92 (05 Sep 2020 05:03) (92 - 101)  BP: 132/68 (05 Sep 2020 05:03) (132/68 - 139/67)  BP(mean): --  RR: 18 (05 Sep 2020 05:03) (18 - 18)  SpO2: 97% (04 Sep 2020 11:00) (97% - 97%)    PHYSICAL EXAM:  Gen: NAD, resting in bed  HEENT: Normocephalic, atraumatic  Neck: supple, no lymphadenopathy  CV: Regular rate & regular rhythm  Lungs: CTAB  Abdomen: Soft, BS present  Ext: Warm, well perfused  Neuro: non focal, awake  Skin: no rash, no lesions    FH: Non-contributory  Social Hx: Non-contributory    TESTS & MEASUREMENTS:                        8.7    10.90 )-----------( 263      ( 04 Sep 2020 11:40 )             26.5     09-04    135  |  102  |  12  ----------------------------<  99  4.1   |  22  |  0.7    Ca    8.1<L>      04 Sep 2020 04:30  Phos  3.1     09-04  Mg     1.8     09-04              Culture - Body Fluid with Gram Stain (collected 09-04-20 @ 10:30)  Source: .Body Fluid Joint Fluid, Left Hip  Gram Stain (09-04-20 @ 22:29):    No polymorphonuclear cells seen    No organisms seen    Culture - Blood (collected 09-03-20 @ 20:52)  Source: .Blood None  Preliminary Report (09-05-20 @ 04:01):    No growth to date.    Culture - Urine (collected 09-03-20 @ 01:30)  Source: .Urine Clean Catch (Midstream)  Preliminary Report (09-04-20 @ 18:15):    >100,000 CFU/ml Escherichia coli            INFECTIOUS DISEASES TESTING  COVID-19 PCR: NotDetec (08-30-20 @ 16:49)      INFLAMMATORY MARKERS      RADIOLOGY & ADDITIONAL TESTS:  I have personally reviewed the last available Chest xray  CXR      CT      CARDIOLOGY TESTING  Transthoracic Echocardiogram:    EXAM:  2-D ECHO (TTE) COMPLETE        PROCEDURE DATE:  08/31/2020      INTERPRETATION:  REPORT:  TRANSTHORACIC ECHOCARDIOGRAM REPORT        Patient Name:   IOANA POTTER Accession #: [id number]  Medical Rec #:  [id number]         Height:      63.0 in 160.0 cm  YOB: 1955        Weight:      185.0 lb 83.91 kg  Patient Age:    64 years          BSA:         1.87 m²  Patient Gender: F                 BP:          116/66 mmHg      Date of Exam:        8/31/2020 1:18:07 PM  Referring Physician: [id number] IRINA NAILS  Sonographer:         Felicia Griffin  Reading Physician:   Livier Colvin M.D.    Procedure:   2D Echo/Doppler/Color Doppler Complete.  Indications: R07.9 - Chest Pain, unspecified  Diagnosis:   Chest pain, unspecified - R07.9        Summary:   1. Normal global left ventricular systolic function.   2. LV Ejection Fraction by Hardin's Method with a biplane EF of 59 %.   3. Mild left ventricular hypertrophy.   4. Mildly increased LV wall thickness.   5. Spectral Doppler shows impaired relaxation pattern of left ventricular myocardial filling (Grade I diastolic dysfunction).   6. Normal left atrial size.   7. Normal right atrial size.   8. Structurally normal mitral valve, with normal leaflet excursion.   9. Trace mitral valve regurgitation.  10. Mild-moderate tricuspid regurgitation.  11. Sclerotic aortic valve with normal opening.  12. Mild pulmonic valve regurgitation.  13. Estimated pulmonary artery systolic pressure is 47.5 mmHg assuming a right atrial pressure of 5 mmHg, which is consistent with mild pulmonary hypertension.  14. LA volume Index is 14.6 ml/m² ml/m2.    PHYSICIAN INTERPRETATION:  Left Ventricle: The left ventricular internal cavity size is normal. Left ventricular wall thicknessis mildly increased. There is mild left ventricular hypertrophy. Global LV systolic function was normal. Spectral Doppler shows impaired relaxation pattern of left ventricular myocardial filling (Grade I diastolic dysfunction). Normal LV filling pressures.  Right Ventricle: Normal right ventricular size and function.  Left Atrium: Normal left atrial size. LA volume Index is 14.6 ml/m² ml/m2.  Right Atrium: Normal right atrial size.  Pericardium: There is no evidence of pericardial effusion.  Mitral Valve: Structurally normal mitral valve, with normal leaflet excursion. Trace mitral valve regurgitation is seen.  Tricuspid Valve: Structurally normal tricuspid valve, with normal leaflet excursion. Mild-moderate tricuspid regurgitation is visualized. Estimated pulmonary artery systolic pressure is 47.5 mmHg assuming a right atrial pressure of 5 mmHg, which is consistent with mild pulmonary hypertension.  Aortic Valve: The aortic valve is trileaflet. Sclerotic aortic valve with normal opening.No evidence of aortic valve regurgitation is seen.  Pulmonic Valve: Structurally normal pulmonic valve, with normal leaflet excursion. Mild pulmonic valve regurgitation.  Aorta: The aortic root and ascending aorta are structurally normal, with no evidence of dilitation.  Pulmonary Artery: The main pulmonary artery is normal in size.  Venous: The inferior vena cava was normal sized, with respiratory size variation greater than 50%.      2D AND M-MODE MEASUREMENTS (normal ranges within parentheses):  Left                  Normal   Aorta/Left             Normal  Ventricle:                     Atrium:  IVSd (2D):  1.13 cm  (0.7-1.1) AoV Cusp       1.76  (1.5-2.6)  LVPWd (2D): 0.72 cm  (0.7-1.1) Separation:     cm  LVIDd (2D): 3.98 cm  (3.4-5.7) Left Atrium    2.70  (1.9-4.0)  LVIDs (2D): 2.75 cm            (Mmode):        cm  LV FS (2D):  30.9 %   (>25%)   LA Volume      14.5  Relative      0.36    (<0.42)  Index         ml/m²  Wall                           Right  Thickness   Ventricle:                                 RVd (2D):      3.19 cm    SPECTRAL DOPPLER ANALYSIS:  LV DIASTOLIC FUNCTION:  MV Peak E: 0.49 m/s Decel Time: 270 msec  MV Peak A: 0.90 m/s  E/A Ratio: 0.54    LVOT Vmax: 1.31 m/s  LVOT VTI:  0.18 m  LVOTDiam: 1.77 cm    Mitral Valve:  MV P1/2 Time: 78.40 msec  MV Area, PHT: 2.81 cm²    Tricuspid Valve and PA/RV Systolic Pressure: TR Max Velocity: 3.26 m/s RA Pressure: 5 mmHg RVSP/PASP: 47.5 mmHg      [id number] Livier Colvin M.D., Electronically signedon 8/31/2020 at 4:35:15 PM            *** Final ***              LIVIER COLVIN M.D., RESIDENT RADIOLOGIST  This document has been electronically signed.  LIVIER COLVIN M.D., RESIDENT RADIOLOGIST  This document has been electronically signed. Aug [id number]  1:18PM             (08-31-20 @ 13:18)  12 Lead ECG:   Ventricular Rate 98 BPM    Atrial Rate 98 BPM    P-R Interval 128 ms    QRS Duration 90 ms    Q-T Interval 356 ms    QTC Calculation(Bezet) 454 ms    P Axis 65 degrees    R Axis 65 degrees    T Axis 4 degrees    Diagnosis Line Normal sinus rhythm  Nonspecific T wave abnormality  Low voltage QRS chest leads  Abnormal ECG    Confirmed by LIVIER COLVIN MD ([id number]) on 8/30/2020 8:20:21 PM (08-30-20 @ 17:44)      MEDICATIONS  cefTRIAXone   IVPB 2000 IV Intermittent every 24 hours  chlorhexidine 4% Liquid 1 Topical <User Schedule>  enoxaparin Injectable 90 SubCutaneous every 12 hours  iron sucrose IVPB 200 IV Intermittent every 24 hours  levothyroxine 50 Oral daily  lidocaine   Patch 1 Transdermal once  lisinopril 5 Oral daily  pantoprazole  Injectable 40 IV Push two times a day  polyethylene glycol 3350 17 Oral daily  sucralfate 1 Oral every 12 hours      WEIGHT  Weight (kg): 87.3 (09-01-20 @ 15:49)      ANTIBIOTICS:  cefTRIAXone   IVPB 2000 milliGRAM(s) IV Intermittent every 24 hours      All available historical records have been reviewed IOANA POTTER  64y, Female  Allergy: No Known Allergies      LOS  6d    CHIEF COMPLAINT: pulmonary embolism (04 Sep 2020 18:10)      INTERVAL EVENTS/HPI  - No acute events overnight  - febrile after IR guided procedure   - T(F): , Max: 101.4 (09-04-20 @ 12:54)  - Denies any coughing, shortness of breath. nausea, vomiting, diarrhea, abdominal pain   - Tolerating medication  - WBC Count: 10.90 (09-04-20 @ 11:40)  WBC Count: 11.69 (09-03-20 @ 06:52)     - Creatinine, Serum: 0.7 (09-04-20 @ 04:30)       ROS  General: Denies rigors, nightsweats  HEENT: Denies headache, rhinorrhea, sore throat, eye pain  CV: Denies CP, palpitations  PULM: Denies wheezing, hemoptysis  GI: Denies hematemesis, hematochezia, melena  : Denies discharge, hematuria  MSK: Denies arthralgias, myalgias  SKIN: Denies rash, lesions  NEURO: Denies paresthesias, weakness  PSYCH: Denies depression, anxiety    VITALS:  T(F): 99.9, Max: 101.4 (09-04-20 @ 12:54)  HR: 92  BP: 132/68  RR: 18Vital Signs Last 24 Hrs  T(C): 37.7 (05 Sep 2020 05:03), Max: 38.6 (04 Sep 2020 12:54)  T(F): 99.9 (05 Sep 2020 05:03), Max: 101.4 (04 Sep 2020 12:54)  HR: 92 (05 Sep 2020 05:03) (92 - 101)  BP: 132/68 (05 Sep 2020 05:03) (132/68 - 139/67)  BP(mean): --  RR: 18 (05 Sep 2020 05:03) (18 - 18)  SpO2: 97% (04 Sep 2020 11:00) (97% - 97%)    PHYSICAL EXAM:  Gen: NAD, resting in bed  HEENT: Normocephalic, atraumatic  Neck: supple, no lymphadenopathy  CV: Regular rate & regular rhythm  Lungs: CTAB  Abdomen: Soft, BS present  Ext: Warm, well perfused; left hip tender to palpation; limted ROM   Neuro: non focal, awake  Skin: no rash, no lesions    FH: Non-contributory  Social Hx: Non-contributory    TESTS & MEASUREMENTS:                        8.7    10.90 )-----------( 263      ( 04 Sep 2020 11:40 )             26.5     09-04    135  |  102  |  12  ----------------------------<  99  4.1   |  22  |  0.7    Ca    8.1<L>      04 Sep 2020 04:30  Phos  3.1     09-04  Mg     1.8     09-04              Culture - Body Fluid with Gram Stain (collected 09-04-20 @ 10:30)  Source: .Body Fluid Joint Fluid, Left Hip  Gram Stain (09-04-20 @ 22:29):    No polymorphonuclear cells seen    No organisms seen    Culture - Blood (collected 09-03-20 @ 20:52)  Source: .Blood None  Preliminary Report (09-05-20 @ 04:01):    No growth to date.    Culture - Urine (collected 09-03-20 @ 01:30)  Source: .Urine Clean Catch (Midstream)  Preliminary Report (09-04-20 @ 18:15):    >100,000 CFU/ml Escherichia coli            INFECTIOUS DISEASES TESTING  COVID-19 PCR: NotDetec (08-30-20 @ 16:49)      INFLAMMATORY MARKERS      RADIOLOGY & ADDITIONAL TESTS:  I have personally reviewed the last available Chest xray  CXR      CT      CARDIOLOGY TESTING  Transthoracic Echocardiogram:    EXAM:  2-D ECHO (TTE) COMPLETE        PROCEDURE DATE:  08/31/2020      INTERPRETATION:  REPORT:  TRANSTHORACIC ECHOCARDIOGRAM REPORT        Patient Name:   IOANA POTETR Accession #: [id number]  Medical Rec #:  [id number]         Height:      63.0 in 160.0 cm  YOB: 1955        Weight:      185.0 lb 83.91 kg  Patient Age:    64 years          BSA:         1.87 m²  Patient Gender: F                 BP:          116/66 mmHg      Date of Exam:        8/31/2020 1:18:07 PM  Referring Physician: [id number] IRINA NAILS  Sonographer:         Felicia Griffin  Reading Physician:   Livier Colvin M.D.    Procedure:   2D Echo/Doppler/Color Doppler Complete.  Indications: R07.9 - Chest Pain, unspecified  Diagnosis:   Chest pain, unspecified - R07.9        Summary:   1. Normal global left ventricular systolic function.   2. LV Ejection Fraction by Hardin's Method with a biplane EF of 59 %.   3. Mild left ventricular hypertrophy.   4. Mildly increased LV wall thickness.   5. Spectral Doppler shows impaired relaxation pattern of left ventricular myocardial filling (Grade I diastolic dysfunction).   6. Normal left atrial size.   7. Normal right atrial size.   8. Structurally normal mitral valve, with normal leaflet excursion.   9. Trace mitral valve regurgitation.  10. Mild-moderate tricuspid regurgitation.  11. Sclerotic aortic valve with normal opening.  12. Mild pulmonic valve regurgitation.  13. Estimated pulmonary artery systolic pressure is 47.5 mmHg assuming a right atrial pressure of 5 mmHg, which is consistent with mild pulmonary hypertension.  14. LA volume Index is 14.6 ml/m² ml/m2.    PHYSICIAN INTERPRETATION:  Left Ventricle: The left ventricular internal cavity size is normal. Left ventricular wall thicknessis mildly increased. There is mild left ventricular hypertrophy. Global LV systolic function was normal. Spectral Doppler shows impaired relaxation pattern of left ventricular myocardial filling (Grade I diastolic dysfunction). Normal LV filling pressures.  Right Ventricle: Normal right ventricular size and function.  Left Atrium: Normal left atrial size. LA volume Index is 14.6 ml/m² ml/m2.  Right Atrium: Normal right atrial size.  Pericardium: There is no evidence of pericardial effusion.  Mitral Valve: Structurally normal mitral valve, with normal leaflet excursion. Trace mitral valve regurgitation is seen.  Tricuspid Valve: Structurally normal tricuspid valve, with normal leaflet excursion. Mild-moderate tricuspid regurgitation is visualized. Estimated pulmonary artery systolic pressure is 47.5 mmHg assuming a right atrial pressure of 5 mmHg, which is consistent with mild pulmonary hypertension.  Aortic Valve: The aortic valve is trileaflet. Sclerotic aortic valve with normal opening.No evidence of aortic valve regurgitation is seen.  Pulmonic Valve: Structurally normal pulmonic valve, with normal leaflet excursion. Mild pulmonic valve regurgitation.  Aorta: The aortic root and ascending aorta are structurally normal, with no evidence of dilitation.  Pulmonary Artery: The main pulmonary artery is normal in size.  Venous: The inferior vena cava was normal sized, with respiratory size variation greater than 50%.      2D AND M-MODE MEASUREMENTS (normal ranges within parentheses):  Left                  Normal   Aorta/Left             Normal  Ventricle:                     Atrium:  IVSd (2D):  1.13 cm  (0.7-1.1) AoV Cusp       1.76  (1.5-2.6)  LVPWd (2D): 0.72 cm  (0.7-1.1) Separation:     cm  LVIDd (2D): 3.98 cm  (3.4-5.7) Left Atrium    2.70  (1.9-4.0)  LVIDs (2D): 2.75 cm            (Mmode):        cm  LV FS (2D):  30.9 %   (>25%)   LA Volume      14.5  Relative      0.36    (<0.42)  Index         ml/m²  Wall                           Right  Thickness   Ventricle:                                 RVd (2D):      3.19 cm    SPECTRAL DOPPLER ANALYSIS:  LV DIASTOLIC FUNCTION:  MV Peak E: 0.49 m/s Decel Time: 270 msec  MV Peak A: 0.90 m/s  E/A Ratio: 0.54    LVOT Vmax: 1.31 m/s  LVOT VTI:  0.18 m  LVOTDiam: 1.77 cm    Mitral Valve:  MV P1/2 Time: 78.40 msec  MV Area, PHT: 2.81 cm²    Tricuspid Valve and PA/RV Systolic Pressure: TR Max Velocity: 3.26 m/s RA Pressure: 5 mmHg RVSP/PASP: 47.5 mmHg      [id number] Livier Colvin M.D., Electronically signedon 8/31/2020 at 4:35:15 PM            *** Final ***              LIVIER COLVIN M.D., RESIDENT RADIOLOGIST  This document has been electronically signed.  LIVIER COLVIN M.D., RESIDENT RADIOLOGIST  This document has been electronically signed. Aug [id number]  1:18PM             (08-31-20 @ 13:18)  12 Lead ECG:   Ventricular Rate 98 BPM    Atrial Rate 98 BPM    P-R Interval 128 ms    QRS Duration 90 ms    Q-T Interval 356 ms    QTC Calculation(Bezet) 454 ms    P Axis 65 degrees    R Axis 65 degrees    T Axis 4 degrees    Diagnosis Line Normal sinus rhythm  Nonspecific T wave abnormality  Low voltage QRS chest leads  Abnormal ECG    Confirmed by LIVIER COLVIN MD ([id number]) on 8/30/2020 8:20:21 PM (08-30-20 @ 17:44)      MEDICATIONS  cefTRIAXone   IVPB 2000 IV Intermittent every 24 hours  chlorhexidine 4% Liquid 1 Topical <User Schedule>  enoxaparin Injectable 90 SubCutaneous every 12 hours  iron sucrose IVPB 200 IV Intermittent every 24 hours  levothyroxine 50 Oral daily  lidocaine   Patch 1 Transdermal once  lisinopril 5 Oral daily  pantoprazole  Injectable 40 IV Push two times a day  polyethylene glycol 3350 17 Oral daily  sucralfate 1 Oral every 12 hours      WEIGHT  Weight (kg): 87.3 (09-01-20 @ 15:49)      ANTIBIOTICS:  cefTRIAXone   IVPB 2000 milliGRAM(s) IV Intermittent every 24 hours      All available historical records have been reviewed

## 2020-09-05 NOTE — PROGRESS NOTE ADULT - SUBJECTIVE AND OBJECTIVE BOX
HPI  The patient is a 64 year-old female with a PMH of HTN and hypothyroidism and a PSH of appendectomy and tonsillectomy presenting for shortness of breath. The patient reports that on Thursday prior to admission that she began experiencing shortness of breath on minimal ambulation associated w/ weakness and dizziness; she denied associated chest pain or pressure; as the shortness of breath worsened she presented to the ED.    Of note, about 1 week prior to admission she started having pain in the left hip; had an MRI done which showed spinal stenosis at L3-L4 and L4-L5; was given a "steroid shot" which did not help the pain. Around the same time the patient began complaining of nausea; started to have non-bloody non-bilious vomiting; had an EGD in [place] (records faxed to us, in chart) which revealed thickening of the gastric pylorus with a deep ulcer suspicious for perforation but with no free air; she was started on a PPI BID which she reports alleviated the nausea and vomiting.     In the ED, T 97.6 F, , /94, RR 19; O2 sat 94%. CT angio chest revealed bilateral central pulmonary embolism extending into subsegmental branches w/ evidence of right heart strain. She was started on a heparin gtt; IR was c/s and performed thrombectomy.     Currently admitted to medicine with the primary diagnosis of Pulmonary embolism. Today is hospital day 6    INTERVAL HPI:  Patient was examined and seen at bedside. This morning she is resting comfortably in bed and reports no new issues or overnight events. Patient's MRI to assess soft tissue mass on prior CT came back negative for any suspicious malignancy but positive for necrosis of femoral head possibly 2/2 to septic arthritis.     Arthrocentesis preliminary results negative for septic arthritis. Given recent fever, patient was started on Rocephin as well as Venefir for low iron. Urine culture positive for E.coli - will continue Rocephin. Plan is to follow up with MRSA nares and blood cultures in case Antibiotic adjustment is necessary. ID is on board.     ROS: Otherwise unremarkable     PAST MEDICAL & SURGICAL HISTORY  HTN (hypertension)  Gastric ulcer  S/P tonsillectomy  S/P appendectomy    ALLERGIES  No Known Allergies    MEDICATIONS  STANDING MEDICATIONS  cefTRIAXone   IVPB 2000 milliGRAM(s) IV Intermittent every 24 hours  chlorhexidine 4% Liquid 1 Application(s) Topical <User Schedule>  enoxaparin Injectable 90 milliGRAM(s) SubCutaneous every 12 hours  iron sucrose IVPB 200 milliGRAM(s) IV Intermittent every 24 hours  levothyroxine 50 MICROGram(s) Oral daily  lidocaine   Patch 1 Patch Transdermal once  lisinopril 5 milliGRAM(s) Oral daily  pantoprazole  Injectable 40 milliGRAM(s) IV Push two times a day  polyethylene glycol 3350 17 Gram(s) Oral daily  sucralfate 1 Gram(s) Oral every 12 hours    PRN MEDICATIONS  acetaminophen   Tablet .. 650 milliGRAM(s) Oral every 6 hours PRN  bisacodyl 5 milliGRAM(s) Oral every 12 hours PRN  lactulose Syrup 10 Gram(s) Oral two times a day PRN  oxycodone    5 mG/acetaminophen 325 mG 1 Tablet(s) Oral every 6 hours PRN    VITALS:  T(F): 97.8  HR: 96  BP: 139/67  RR: 18  SpO2: --    PHYSICAL EXAM  GEN: NAD, Resting comfortably in bed  PULM: Clear to auscultation bilaterally, No wheezes  CVS: RRR, S1-S2, no murmurs  ABD: Soft, non-tender, non-distended, no guarding  EXT: No edema  NEURO: A&Ox3, no focal deficits    LABS                        8.2    9.80  )-----------( 255      ( 05 Sep 2020 07:12 )             25.6     09-05    141  |  105  |  13  ----------------------------<  96  3.7   |  26  |  0.7    Ca    8.3<L>      05 Sep 2020 07:12  Phos  3.4     09-05  Mg     1.8     09-05      PT/INR - ( 04 Sep 2020 04:30 )   PT: 15.10 sec;   INR: 1.31 ratio    PTT - ( 04 Sep 2020 04:30 )  PTT:25.4 sec      Culture - Body Fluid with Gram Stain (collected 04 Sep 2020 10:30)  Source: .Body Fluid Joint Fluid, Left Hip  Gram Stain (04 Sep 2020 22:29):    No polymorphonuclear cells seen    No organisms seen    Culture - Blood (collected 03 Sep 2020 20:52)  Source: .Blood None  Preliminary Report (05 Sep 2020 04:01):    No growth to date.    Culture - Urine (collected 03 Sep 2020 01:30)  Source: .Urine Clean Catch (Midstream)  Preliminary Report (04 Sep 2020 18:15):    >100,000 CFU/ml Escherichia coli    RADIOLOGY  MRI L hip 9/2  1. 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 Associated stress related sclerosis/edema in the left femoral neck and left acetabulum. Extensive surrounding edema/inflammation. Given these findings, septic arthritis cannot be excluded.  2. Large heterogeneous joint effusion may be due to internal hemorrhage.  3. Apparent tendon tears as above.

## 2020-09-05 NOTE — PROGRESS NOTE ADULT - ASSESSMENT
ASSESSMENT  The patient is a 64 year-old female with a PMH of HTN and hypothyroidism and a PSH of appendectomy and tonsillectomy presenting for shortness of breath, admitted for pulmonary embolism s/p IR thrombectomy, L hip diagnostic arthrocentesis for soft tissue mass/femoral head necrosis seen on MRI with preliminary negative culture of joint aspirate on Antibiotics for +UTI/recent fevers.      PLAN    # High grade fever d/t septic arthritis vs. UTI   - Urine Cx results: E. Coli  - c/w IV Rocephin as per ID reccs  - Tylenol PRN for fever  - f/u blood Cx   - f/u final joint fluid analysis    # Submassive PE l/t acute respiratory failure   - s/p thrombectomy by IR on 8/31  - pt is off oxygen and comfortable at rest now on RA  - c/w Lovenox 90 mg q12 hours   - likely Eliquis on d/c   - monitor pulse Ox   - c/w  Lidoderm patch for pleuritic chest pain     # Acute diastolic CHF/ PHTN  - c/w Fluid restriction 1500 ml in 24 hours, daily weights  - low sodium diet, intake and output monitoring   - pt is clinically improving after thrombectomy   - troponin WNL today   - d/c telemetry   - monitor for fluid overload     # PUD Gastric ulcer at pylorus/ Gastritis   - pt has recent EGD , report is in the paper chart  - biopsy was negative for H.Pylori / dysplasia    - IV PPIs for now   - Carafate 1 gm Q 12 hour   - avoid NSAIDS   - No evidence of active bleed  - pt was consulted by GI, recommendations noted     # Iron deficiency anemia  - started IV iron infusions for 3 days   - no active bleeding noted   - monitor H/H, keep Hb above 7.5 and active type/cross  - H/H currently 8.2/29    # H/o hypertension  - DASH diet   - resume Lisinopril     # Inability to walk/ spinal stenosis/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

## 2020-09-06 LAB
ANION GAP SERPL CALC-SCNC: 13 MMOL/L — SIGNIFICANT CHANGE UP (ref 7–14)
BASOPHILS # BLD AUTO: 0.02 K/UL — SIGNIFICANT CHANGE UP (ref 0–0.2)
BASOPHILS NFR BLD AUTO: 0.2 % — SIGNIFICANT CHANGE UP (ref 0–1)
BUN SERPL-MCNC: 14 MG/DL — SIGNIFICANT CHANGE UP (ref 10–20)
CALCIUM SERPL-MCNC: 8.1 MG/DL — LOW (ref 8.5–10.1)
CHLORIDE SERPL-SCNC: 103 MMOL/L — SIGNIFICANT CHANGE UP (ref 98–110)
CO2 SERPL-SCNC: 24 MMOL/L — SIGNIFICANT CHANGE UP (ref 17–32)
CREAT SERPL-MCNC: 0.7 MG/DL — SIGNIFICANT CHANGE UP (ref 0.7–1.5)
EOSINOPHIL # BLD AUTO: 0.18 K/UL — SIGNIFICANT CHANGE UP (ref 0–0.7)
EOSINOPHIL NFR BLD AUTO: 1.9 % — SIGNIFICANT CHANGE UP (ref 0–8)
GLUCOSE SERPL-MCNC: 88 MG/DL — SIGNIFICANT CHANGE UP (ref 70–99)
HCT VFR BLD CALC: 26.2 % — LOW (ref 37–47)
HGB BLD-MCNC: 8.3 G/DL — LOW (ref 12–16)
IMM GRANULOCYTES NFR BLD AUTO: 0.5 % — HIGH (ref 0.1–0.3)
LYMPHOCYTES # BLD AUTO: 1.31 K/UL — SIGNIFICANT CHANGE UP (ref 1.2–3.4)
LYMPHOCYTES # BLD AUTO: 14 % — LOW (ref 20.5–51.1)
MAGNESIUM SERPL-MCNC: 1.8 MG/DL — SIGNIFICANT CHANGE UP (ref 1.8–2.4)
MCHC RBC-ENTMCNC: 29.3 PG — SIGNIFICANT CHANGE UP (ref 27–31)
MCHC RBC-ENTMCNC: 31.7 G/DL — LOW (ref 32–37)
MCV RBC AUTO: 92.6 FL — SIGNIFICANT CHANGE UP (ref 81–99)
MONOCYTES # BLD AUTO: 0.77 K/UL — HIGH (ref 0.1–0.6)
MONOCYTES NFR BLD AUTO: 8.2 % — SIGNIFICANT CHANGE UP (ref 1.7–9.3)
NEUTROPHILS # BLD AUTO: 7.05 K/UL — HIGH (ref 1.4–6.5)
NEUTROPHILS NFR BLD AUTO: 75.2 % — SIGNIFICANT CHANGE UP (ref 42.2–75.2)
NRBC # BLD: 0 /100 WBCS — SIGNIFICANT CHANGE UP (ref 0–0)
PHOSPHATE SERPL-MCNC: 3.2 MG/DL — SIGNIFICANT CHANGE UP (ref 2.1–4.9)
PLATELET # BLD AUTO: 285 K/UL — SIGNIFICANT CHANGE UP (ref 130–400)
POTASSIUM SERPL-MCNC: 4 MMOL/L — SIGNIFICANT CHANGE UP (ref 3.5–5)
POTASSIUM SERPL-SCNC: 4 MMOL/L — SIGNIFICANT CHANGE UP (ref 3.5–5)
RBC # BLD: 2.83 M/UL — LOW (ref 4.2–5.4)
RBC # FLD: 12.9 % — SIGNIFICANT CHANGE UP (ref 11.5–14.5)
SODIUM SERPL-SCNC: 140 MMOL/L — SIGNIFICANT CHANGE UP (ref 135–146)
WBC # BLD: 9.38 K/UL — SIGNIFICANT CHANGE UP (ref 4.8–10.8)
WBC # FLD AUTO: 9.38 K/UL — SIGNIFICANT CHANGE UP (ref 4.8–10.8)

## 2020-09-06 PROCEDURE — 99233 SBSQ HOSP IP/OBS HIGH 50: CPT

## 2020-09-06 RX ORDER — APIXABAN 2.5 MG/1
5 TABLET, FILM COATED ORAL EVERY 12 HOURS
Refills: 0 | Status: DISCONTINUED | OUTPATIENT
Start: 2020-09-06 | End: 2020-09-09

## 2020-09-06 RX ORDER — PANTOPRAZOLE SODIUM 20 MG/1
40 TABLET, DELAYED RELEASE ORAL
Refills: 0 | Status: DISCONTINUED | OUTPATIENT
Start: 2020-09-06 | End: 2020-09-17

## 2020-09-06 RX ADMIN — Medication 650 MILLIGRAM(S): at 02:50

## 2020-09-06 RX ADMIN — Medication 50 MICROGRAM(S): at 05:31

## 2020-09-06 RX ADMIN — PANTOPRAZOLE SODIUM 40 MILLIGRAM(S): 20 TABLET, DELAYED RELEASE ORAL at 17:02

## 2020-09-06 RX ADMIN — ENOXAPARIN SODIUM 90 MILLIGRAM(S): 100 INJECTION SUBCUTANEOUS at 05:30

## 2020-09-06 RX ADMIN — Medication 650 MILLIGRAM(S): at 13:36

## 2020-09-06 RX ADMIN — PANTOPRAZOLE SODIUM 40 MILLIGRAM(S): 20 TABLET, DELAYED RELEASE ORAL at 05:31

## 2020-09-06 RX ADMIN — Medication 650 MILLIGRAM(S): at 03:30

## 2020-09-06 RX ADMIN — LISINOPRIL 5 MILLIGRAM(S): 2.5 TABLET ORAL at 05:30

## 2020-09-06 RX ADMIN — CEFTRIAXONE 100 MILLIGRAM(S): 500 INJECTION, POWDER, FOR SOLUTION INTRAMUSCULAR; INTRAVENOUS at 02:51

## 2020-09-06 RX ADMIN — Medication 650 MILLIGRAM(S): at 13:15

## 2020-09-06 RX ADMIN — APIXABAN 5 MILLIGRAM(S): 2.5 TABLET, FILM COATED ORAL at 17:02

## 2020-09-06 RX ADMIN — LIDOCAINE 1 PATCH: 4 CREAM TOPICAL at 01:00

## 2020-09-06 RX ADMIN — Medication 1 GRAM(S): at 05:31

## 2020-09-06 RX ADMIN — Medication 1 GRAM(S): at 17:02

## 2020-09-06 RX ADMIN — CHLORHEXIDINE GLUCONATE 1 APPLICATION(S): 213 SOLUTION TOPICAL at 05:31

## 2020-09-06 NOTE — PROGRESS NOTE ADULT - ASSESSMENT
ASSESSMENT AND PLAN:    The patient is a 64 year-old female with a PMH of HTN and hypothyroidism and a PSH of appendectomy and tonsillectomy presenting for shortness of breath, admitted for pulmonary embolism.    # High grade fever/ suspected  septic arthritis/ UTI   - c/w Ceftriaxone, ID consulted appreciated, pt needs ID follow up ( joint and blood Cx are negative)    - fever is likely non infectious, likely   due to acute DVT and PE   -  urine Cx positive for E.coli  - Tylenol PRN for fever      # Acute respiratory failure due to  submassive acute pulmonary embolism  - resolved   - s/p thrombectomy by  IR on 8/31  - pt is off oxygen and comfortable at rest now on RA  - start   Eliquis 5 mg po Q BID   - monitor pulse Ox   -  Lidoderm patch for pleuritic chest pain     # Acute diastolic CHF/ PHTN  - c/w  Fluid restriction 1500 ml in 24 hours, daily weight  - low sodium diet, intake and output monitoring   - pt is clinically improving after thrombectomy   - troponin WNL   - monitor for fluid overload       #  PUD Gastric ulcer at pylorus/ Gastritis   - pt has recent EGD , report is in the paper chart  - biopsy was negative for H.Pylori / dysplasia    - Protonix 40 mg BID    - Carafate 1 gm Q 12 hour   - avoid NSAIDS   - No evidence of active bleed  - pt was consulted by GI, recommendations noted     # Iron deficiency anemia  - received IV Venofer while in the hospital   - no active bleeding noted   - monitor H/H, keep Hb above 7.5 and active type/cross      # H/o hypertension  - DASH diet   -on  Lisinopril     # Inability to walk/ spinal stenosis/ degenerative Dz in L-spine/ Left hip pain   - MRI findings noted , case d/w ortho attending  - s/p  arthrocentesis by IR   - pain control  - PT/rehab evaluation   - fall precautions   - OOB to chair     #Progress Note Handoff  Pending (specify): start Eliquis, change Protonix to po BID, PT/Rehab with non weight bearing on LLE   Family discussion: I spoke with pt, she agreed with a plan of care   Disposition: Home_x __/SNF___/Other________/Unknown at this time________

## 2020-09-06 NOTE — DIETITIAN INITIAL EVALUATION ADULT. - OTHER INFO
Pt was admitted for pulmonary embolism, thrombectomy on 8/31/20 with IR. Acute diastolic CHF (needs fluid restriction 1500 ml/d per MD). PUD Gastric ulcer at pylorus/ Gastritis - seen by GI. Inability to walk/ spinal stenosis/ degenerative dz - Ortho and PT/rehab on board.

## 2020-09-06 NOTE — PROGRESS NOTE ADULT - SUBJECTIVE AND OBJECTIVE BOX
64 year-old female with a PMH of PUD, HTN and hypothyroidism and a PSH of appendectomy and tonsillectomy presenting for shortness of breath, weakness and dizziness. The patient was found to have submassive pulmonary embolism and underwent thrombectomy on 8/31/20 with IR. The patient was anticoagulated with Heparin ggt. She was followed by GI for PUD.  Pt was downgraded to telemetry from ICU on 9/1/20. While in the hospital pt was diagnosed with iron deficiency amenia.   MRI of the left hip showed significant joint effusion, case discussed with ortho attending, IR consulted , pt underwent arthrocentesis. .  Pt feels much better today, denies chest pain, SOB, c/o hip pain at times.       PAST MEDICAL & SURGICAL HISTORY  HTN (hypertension)  Gastric ulcer  S/P tonsillectomy  S/P appendectomy      ALLERGIES:  No Known Allergies    Vital Signs Last 24 Hrs  T(C): 37.6 (06 Sep 2020 04:59), Max: 38.5 (06 Sep 2020 02:38)  T(F): 99.6 (06 Sep 2020 04:59), Max: 101.3 (06 Sep 2020 02:38)  HR: 86 (06 Sep 2020 04:59) (86 - 90)  BP: 112/65 (06 Sep 2020 04:59) (110/60 - 133/65)  BP(mean): --  RR: 18 (06 Sep 2020 04:59) (18 - 18)  SpO2: 95% (05 Sep 2020 22:28) (95% - 95%)    PHYSICAL EXAM:  GEN: No acute distress  HEENT: normocephalic, atraumatic  LUNGS: decreased breath sounds at bases   HEART: S1/S2 present. RRR, no murmurs  ABD: Soft, non-tender, non-distended. Bowel sounds present  EXT: NC/NC/NE/2+PP/FRANCES, decreased ROM in LLE due to pain   NEURO: AAOX3, normal affect, no focal neuro deficit       LABS:                                   8.3    9.38  )-----------( 285      ( 06 Sep 2020 06:53 )             26.2   09-06    140  |  103  |  14  ----------------------------<  88  4.0   |  24  |  0.7    Ca    8.1<L>      06 Sep 2020 06:53  Phos  3.2     09-06  Mg     1.8     09-06      Culture - Body Fluid with Gram Stain (09.04.20 @ 10:30)    Gram Stain:   No polymorphonuclear cells seen  No organisms seen    Specimen Source: .Body Fluid Joint Fluid, Left Hip    Culture - Blood (09.03.20 @ 20:52)    Specimen Source: .Blood None    Culture Results:   No growth to date.    Culture - Urine (09.03.20 @ 01:30)    Specimen Source: .Urine Clean Catch (Midstream)    Culture Results:   >100,000 CFU/ml Escherichia coli      RADIOLOGY:  < from: [facility] Duplex Lower Ext Vein Scan, Bilat (08.31.20 @ 17:39) >  Impression:    No evidence of deep venous thrombosis or superficial thrombophlebitis in the right lower extremity.    Acute left popliteal, posterior tibial peroneal vein DVT    < end of copied text >    < from: IR Procedure (08.31.20 @ 12:48) >  FINDINGS:    1. Preliminary ultrasound demonstrates patent right common femoral vein.    2. Right pulmonary artery angiogram: Extensive filling defects within the right main pulmonary artery extending into the upper and lower lobes.    3. Left pulmonary artery angiogram: Small filling defects within the left lower lobe pulmonary arterial segmental branches with oligemia.    4. Successful mechanical suction thrombectomy in right main pulmonary artery as well as left lower lobe segmental pulmonary artery utilizing Inari suction thrombectomy catheter.    < end of copied text >  < from: Transthoracic Echocardiogram (08.31.20 @ 13:18) >  Summary:   1. Normal global left ventricular systolic function.   2. LV Ejection Fraction by Hardin's Method with a biplane EF of 59 %.   3. Mild left ventricular hypertrophy.   4. Mildly increased LV wall thickness.   5. Spectral Doppler shows impaired relaxation pattern of left ventricular myocardial filling (Grade I diastolic dysfunction).   6. Normal left atrial size.   7. Normal right atrial size.   8. Structurally normal mitral valve, with normal leaflet excursion.   9. Trace mitral valve regurgitation.  10. Mild-moderate tricuspid regurgitation.  11. Sclerotic aortic valve with normal opening.  12. Mild pulmonic valve regurgitation.  13. Estimated pulmonary artery systolic pressure is 47.5 mmHg assuming a right atrial pressure of 5 mmHg, which is consistent with mild pulmonary hypertension.  14. LA volume Index is 14.6 ml/m² ml/m2.    < from: MR Hip w/wo IV Cont, Left (09.02.20 @ 19:42) >  IMPRESSION:    1. 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 Associated stress related sclerosis/edema in the left femoral neck and left acetabulum. Extensive surrounding edema/inflammation. Given these findings, septic arthritis cannot be excluded.  2. Large heterogeneous joint effusion may be due to internal hemorrhage.  3. Apparent tendon tears as above.    < end of copied text >    S/p image guided left hip aspiration, obtaining approx 5cc thick purulent fluid using a 5Fr [employer] needle. Specimen given to primary team resident.    MEDICATIONS  (STANDING):  cefTRIAXone   IVPB 2000 milliGRAM(s) IV Intermittent every 24 hours  chlorhexidine 4% Liquid 1 Application(s) Topical <User Schedule>  enoxaparin Injectable 90 milliGRAM(s) SubCutaneous every 12 hours  levothyroxine 50 MICROGram(s) Oral daily  lisinopril 5 milliGRAM(s) Oral daily  pantoprazole  Injectable 40 milliGRAM(s) IV Push two times a day  polyethylene glycol 3350 17 Gram(s) Oral daily  sucralfate 1 Gram(s) Oral every 12 hours    MEDICATIONS  (PRN):  acetaminophen   Tablet .. 650 milliGRAM(s) Oral every 6 hours PRN Temp greater or equal to 38.5C (101.3F)  bisacodyl 5 milliGRAM(s) Oral every 12 hours PRN Constipation  lactulose Syrup 10 Gram(s) Oral two times a day PRN constipation  lidocaine   Patch 1 Patch Transdermal daily PRN chest pain  oxycodone    5 mG/acetaminophen 325 mG 1 Tablet(s) Oral every 6 hours PRN Severe Pain (7 - 10)

## 2020-09-06 NOTE — DIETITIAN INITIAL EVALUATION ADULT. - DIET TYPE
* Information obtained in Polish as pt is fluent and comfortable in this language. Pt reports good appetite at home, follows regular diet, avoids sugar. NKFA. No PO supplements. Pt prefers home made meals over hospital food. Her friend provides additional food from home (pt is aware of current 1.5 fluid restriction) * Information obtained in Polish as pt is fluent and comfortable in this language. Pt reports good appetite at home, follows regular diet, avoids sugar. NKFA. No PO supplements. Pt prefers home made meals over hospital food. Her friend provides additional food from home (pt is aware of current 1.5 fluid restriction). Pt reports stable weight, ~185lbs

## 2020-09-06 NOTE — DIETITIAN INITIAL EVALUATION ADULT. - ENERGY NEEDS
Estimated energy needs: ~1400kcal/day (MSJ x 1.0) - BMI > 30 considered  Estimated protein needs: 63-73gm/day (1.2-1.4gm/kg IBW)  - as above  Estimated fluid needs: 1500ml/day as per LIP

## 2020-09-06 NOTE — PHYSICAL THERAPY INITIAL EVALUATION ADULT - GENERAL OBSERVATIONS, REHAB EVAL
14:00-14:28 Pt encountered semifowler in bed in NAD. [place]  # [id number]. Pt agreeable for PT, reports pain 5/10 on pain scale but improving since admission .

## 2020-09-06 NOTE — DIETITIAN INITIAL EVALUATION ADULT. - ADD RECOMMEND
continue current diet. RD discussed fluid restriction and therapeutic diet. Nutrition goal - in 7 days PO intake continues > meals and snacks.

## 2020-09-06 NOTE — PHYSICAL THERAPY INITIAL EVALUATION ADULT - GAIT DEVIATIONS NOTED, PT EVAL
decreased step length/decreased weight-shifting ability/decreased katty/increased time in double stance

## 2020-09-06 NOTE — PHYSICAL THERAPY INITIAL EVALUATION ADULT - PERTINENT HX OF CURRENT PROBLEM, REHAB EVAL
pt is a 63 y/o MH of HTN and hypothyroidism and a PSH of appendectomy and tonsillectomy presenting for shortness of breath. The patient reports that on Thursday she began experiencing shortness of breath on minimal ambulation associated w/ weakness and dizziness. Pt also with c/o of L hip pain.

## 2020-09-07 LAB
ANION GAP SERPL CALC-SCNC: 13 MMOL/L — SIGNIFICANT CHANGE UP (ref 7–14)
BASOPHILS # BLD AUTO: 0.02 K/UL — SIGNIFICANT CHANGE UP (ref 0–0.2)
BASOPHILS NFR BLD AUTO: 0.2 % — SIGNIFICANT CHANGE UP (ref 0–1)
BUN SERPL-MCNC: 18 MG/DL — SIGNIFICANT CHANGE UP (ref 10–20)
CALCIUM SERPL-MCNC: 8.1 MG/DL — LOW (ref 8.5–10.1)
CHLORIDE SERPL-SCNC: 102 MMOL/L — SIGNIFICANT CHANGE UP (ref 98–110)
CO2 SERPL-SCNC: 24 MMOL/L — SIGNIFICANT CHANGE UP (ref 17–32)
CREAT SERPL-MCNC: 0.6 MG/DL — LOW (ref 0.7–1.5)
EOSINOPHIL # BLD AUTO: 0.15 K/UL — SIGNIFICANT CHANGE UP (ref 0–0.7)
EOSINOPHIL NFR BLD AUTO: 1.8 % — SIGNIFICANT CHANGE UP (ref 0–8)
GLUCOSE SERPL-MCNC: 101 MG/DL — HIGH (ref 70–99)
HCT VFR BLD CALC: 24.4 % — LOW (ref 37–47)
HGB BLD-MCNC: 7.7 G/DL — LOW (ref 12–16)
IMM GRANULOCYTES NFR BLD AUTO: 0.9 % — HIGH (ref 0.1–0.3)
LYMPHOCYTES # BLD AUTO: 1.57 K/UL — SIGNIFICANT CHANGE UP (ref 1.2–3.4)
LYMPHOCYTES # BLD AUTO: 18.4 % — LOW (ref 20.5–51.1)
MCHC RBC-ENTMCNC: 29.2 PG — SIGNIFICANT CHANGE UP (ref 27–31)
MCHC RBC-ENTMCNC: 31.6 G/DL — LOW (ref 32–37)
MCV RBC AUTO: 92.4 FL — SIGNIFICANT CHANGE UP (ref 81–99)
MONOCYTES # BLD AUTO: 0.74 K/UL — HIGH (ref 0.1–0.6)
MONOCYTES NFR BLD AUTO: 8.7 % — SIGNIFICANT CHANGE UP (ref 1.7–9.3)
NEUTROPHILS # BLD AUTO: 5.98 K/UL — SIGNIFICANT CHANGE UP (ref 1.4–6.5)
NEUTROPHILS NFR BLD AUTO: 70 % — SIGNIFICANT CHANGE UP (ref 42.2–75.2)
NRBC # BLD: 0 /100 WBCS — SIGNIFICANT CHANGE UP (ref 0–0)
PLATELET # BLD AUTO: 330 K/UL — SIGNIFICANT CHANGE UP (ref 130–400)
POTASSIUM SERPL-MCNC: 3.9 MMOL/L — SIGNIFICANT CHANGE UP (ref 3.5–5)
POTASSIUM SERPL-SCNC: 3.9 MMOL/L — SIGNIFICANT CHANGE UP (ref 3.5–5)
RBC # BLD: 2.64 M/UL — LOW (ref 4.2–5.4)
RBC # FLD: 13.2 % — SIGNIFICANT CHANGE UP (ref 11.5–14.5)
SODIUM SERPL-SCNC: 139 MMOL/L — SIGNIFICANT CHANGE UP (ref 135–146)
WBC # BLD: 8.54 K/UL — SIGNIFICANT CHANGE UP (ref 4.8–10.8)
WBC # FLD AUTO: 8.54 K/UL — SIGNIFICANT CHANGE UP (ref 4.8–10.8)

## 2020-09-07 PROCEDURE — 99233 SBSQ HOSP IP/OBS HIGH 50: CPT

## 2020-09-07 RX ORDER — TUBERCULIN PURIFIED PROTEIN DERIVATIVE 5 [IU]/.1ML
5 INJECTION, SOLUTION INTRADERMAL ONCE
Refills: 0 | Status: COMPLETED | OUTPATIENT
Start: 2020-09-07 | End: 2020-09-07

## 2020-09-07 RX ADMIN — APIXABAN 5 MILLIGRAM(S): 2.5 TABLET, FILM COATED ORAL at 06:19

## 2020-09-07 RX ADMIN — TUBERCULIN PURIFIED PROTEIN DERIVATIVE 5 UNIT(S): 5 INJECTION, SOLUTION INTRADERMAL at 11:40

## 2020-09-07 RX ADMIN — PANTOPRAZOLE SODIUM 40 MILLIGRAM(S): 20 TABLET, DELAYED RELEASE ORAL at 06:20

## 2020-09-07 RX ADMIN — Medication 650 MILLIGRAM(S): at 04:38

## 2020-09-07 RX ADMIN — OXYCODONE AND ACETAMINOPHEN 1 TABLET(S): 5; 325 TABLET ORAL at 13:32

## 2020-09-07 RX ADMIN — Medication 50 MICROGRAM(S): at 06:19

## 2020-09-07 RX ADMIN — Medication 650 MILLIGRAM(S): at 06:17

## 2020-09-07 RX ADMIN — CHLORHEXIDINE GLUCONATE 1 APPLICATION(S): 213 SOLUTION TOPICAL at 06:19

## 2020-09-07 RX ADMIN — APIXABAN 5 MILLIGRAM(S): 2.5 TABLET, FILM COATED ORAL at 17:19

## 2020-09-07 RX ADMIN — LISINOPRIL 5 MILLIGRAM(S): 2.5 TABLET ORAL at 06:19

## 2020-09-07 RX ADMIN — Medication 1 GRAM(S): at 06:19

## 2020-09-07 RX ADMIN — CEFTRIAXONE 100 MILLIGRAM(S): 500 INJECTION, POWDER, FOR SOLUTION INTRAMUSCULAR; INTRAVENOUS at 03:28

## 2020-09-07 RX ADMIN — POLYETHYLENE GLYCOL 3350 17 GRAM(S): 17 POWDER, FOR SOLUTION ORAL at 11:01

## 2020-09-07 RX ADMIN — Medication 1 GRAM(S): at 17:19

## 2020-09-07 RX ADMIN — PANTOPRAZOLE SODIUM 40 MILLIGRAM(S): 20 TABLET, DELAYED RELEASE ORAL at 17:19

## 2020-09-07 NOTE — PROGRESS NOTE ADULT - SUBJECTIVE AND OBJECTIVE BOX
64 year-old female with a PMH of PUD, HTN and hypothyroidism and a PSH of appendectomy and tonsillectomy presenting for shortness of breath, weakness and dizziness. The patient was found to have submassive pulmonary embolism and underwent thrombectomy on 8/31/20 with IR. The patient was anticoagulated with Heparin ggt. She was followed by GI for PUD.  Pt was downgraded to telemetry from ICU on 9/1/20. While in the hospital pt was diagnosed with iron deficiency amenia.   MRI of the left hip showed significant joint effusion, case discussed with ortho attending, IR consulted , pt underwent arthrocentesis. .  While in the hospital pt is spiking fever, most likely noninfectious, all Cx came back negative, pt completed course of IV Abx.  Today pt feels OK, asking for PT and discharge.       PAST MEDICAL & SURGICAL HISTORY  HTN (hypertension)  Gastric ulcer  S/P tonsillectomy  S/P appendectomy      ALLERGIES:  No Known Allergies    Vital Signs Last 24 Hrs  T(C): 37.2 (07 Sep 2020 13:52), Max: 38.8 (07 Sep 2020 04:37)  T(F): 98.9 (07 Sep 2020 13:52), Max: 101.9 (07 Sep 2020 04:37)  HR: 99 (07 Sep 2020 13:52) (90 - 99)  BP: 126/66 (07 Sep 2020 13:52) (122/66 - 126/66)  BP(mean): --  RR: 19 (07 Sep 2020 13:52) (18 - 19)  SpO2: 95% (07 Sep 2020 06:13) (95% - 95%)    PHYSICAL EXAM:  GEN: No acute distress  HEENT: normocephalic, atraumatic  LUNGS: decreased breath sounds at bases   HEART: S1/S2 present. RRR, no murmurs  ABD: Soft, non-tender, non-distended. Bowel sounds present  EXT: NC/NC/NE/2+PP/FRANCES, decreased ROM in LLE due to pain   NEURO: AAOX3, normal affect, no focal neuro deficit       LABS:                                   7.7    8.54  )-----------( 330      ( 07 Sep 2020 05:40 )             24.4   09-07    139  |  102  |  18  ----------------------------<  101<H>  3.9   |  24  |  0.6<L>    Ca    8.1<L>      07 Sep 2020 05:40  Phos  3.2     09-06  Mg     1.8     09-06      Culture - Body Fluid with Gram Stain (09.04.20 @ 10:30)    Gram Stain:   No polymorphonuclear cells seen  No organisms seen    Specimen Source: .Body Fluid Joint Fluid, Left Hip    Culture - Blood (09.03.20 @ 20:52)    Specimen Source: .Blood None    Culture Results:   No growth to date.    Culture - Urine (09.03.20 @ 01:30)    Specimen Source: .Urine Clean Catch (Midstream)    Culture Results:   >100,000 CFU/ml Escherichia coli      RADIOLOGY:  < from: [facility] Duplex Lower Ext Vein Scan, Bilat (08.31.20 @ 17:39) >  Impression:    No evidence of deep venous thrombosis or superficial thrombophlebitis in the right lower extremity.    Acute left popliteal, posterior tibial peroneal vein DVT    < end of copied text >    < from: IR Procedure (08.31.20 @ 12:48) >  FINDINGS:    1. Preliminary ultrasound demonstrates patent right common femoral vein.    2. Right pulmonary artery angiogram: Extensive filling defects within the right main pulmonary artery extending into the upper and lower lobes.    3. Left pulmonary artery angiogram: Small filling defects within the left lower lobe pulmonary arterial segmental branches with oligemia.    4. Successful mechanical suction thrombectomy in right main pulmonary artery as well as left lower lobe segmental pulmonary artery utilizing Inari suction thrombectomy catheter.    < end of copied text >  < from: Transthoracic Echocardiogram (08.31.20 @ 13:18) >  Summary:   1. Normal global left ventricular systolic function.   2. LV Ejection Fraction by Hardin's Method with a biplane EF of 59 %.   3. Mild left ventricular hypertrophy.   4. Mildly increased LV wall thickness.   5. Spectral Doppler shows impaired relaxation pattern of left ventricular myocardial filling (Grade I diastolic dysfunction).   6. Normal left atrial size.   7. Normal right atrial size.   8. Structurally normal mitral valve, with normal leaflet excursion.   9. Trace mitral valve regurgitation.  10. Mild-moderate tricuspid regurgitation.  11. Sclerotic aortic valve with normal opening.  12. Mild pulmonic valve regurgitation.  13. Estimated pulmonary artery systolic pressure is 47.5 mmHg assuming a right atrial pressure of 5 mmHg, which is consistent with mild pulmonary hypertension.  14. LA volume Index is 14.6 ml/m² ml/m2.    < from: MR Hip w/wo IV Cont, Left (09.02.20 @ 19:42) >  IMPRESSION:    1. 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 Associated stress related sclerosis/edema in the left femoral neck and left acetabulum. Extensive surrounding edema/inflammation. Given these findings, septic arthritis cannot be excluded.  2. Large heterogeneous joint effusion may be due to internal hemorrhage.  3. Apparent tendon tears as above.    < end of copied text >    S/p image guided left hip aspiration, obtaining approx 5cc thick purulent fluid using a 5Fr Yueh needle. Specimen given to primary team resident.    MEDICATIONS  (STANDING):  apixaban 5 milliGRAM(s) Oral every 12 hours  chlorhexidine 4% Liquid 1 Application(s) Topical <User Schedule>  levothyroxine 50 MICROGram(s) Oral daily  lisinopril 5 milliGRAM(s) Oral daily  pantoprazole   Suspension 40 milliGRAM(s) Oral two times a day  polyethylene glycol 3350 17 Gram(s) Oral daily  sucralfate 1 Gram(s) Oral every 12 hours    MEDICATIONS  (PRN):  acetaminophen   Tablet .. 650 milliGRAM(s) Oral every 6 hours PRN Temp greater or equal to 38.5C (101.3F)  bisacodyl 5 milliGRAM(s) Oral every 12 hours PRN Constipation  lactulose Syrup 10 Gram(s) Oral two times a day PRN constipation  lidocaine   Patch 1 Patch Transdermal daily PRN chest pain  oxycodone    5 mG/acetaminophen 325 mG 1 Tablet(s) Oral every 6 hours PRN Severe Pain (7 - 10)

## 2020-09-07 NOTE — PROGRESS NOTE ADULT - ASSESSMENT
ASSESSMENT  64yFemale with a PMH of hypertension, peptic ulcer disease, hypothyroidism admitted to hospital for submassive PE s/p thrombectomy of 4 clots. Patient complaining of left hip pain, underwent MRI revealing extensive necrosis of femoral head and acetabulum and joint effusion, now s/p image guided arthrocentesis by IR revealing purulent fluid high in RBCs w moderate WBCs. Patient started on ceftriaxone 2000mg IV q24hr. 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 with surrounding edema/inflammation with extensive joint effusion  # Low suspicion for septic arthritis s/p image guided arthrocentesis by IR revealing 5cc "purulent" fluid with high level of RBCs and moderate WBCs  - s/p IR guided drainage: 208812 RBCs and 3351 nucleated cells (83% neutrophils)  - Blood Cx 9/3 NG, 9/5 BCX NGTD   - Hip Cx 9/4 NGTD  #PE     RECOMMENDATIONS  - D/C ceftriaxone and monitor off abx   - malignancy workup per primary team   - f/u outpatient workup for necrosis of femoral head and possible hypercoagulable state that lead to PE   This is a preliminary incomplete pended note, all final recommendations to follow after interview and examination of the patient.     Please call with any questions or send a message on [employer] Teams  Spectra [phone] ASSESSMENT  64yFemale with a PMH of hypertension, peptic ulcer disease, hypothyroidism admitted to hospital for submassive PE s/p thrombectomy of 4 clots. Patient complaining of left hip pain, underwent MRI revealing extensive necrosis of femoral head and acetabulum and joint effusion, now s/p image guided arthrocentesis by IR revealing purulent fluid high in RBCs w moderate WBCs. Patient started on ceftriaxone 2000mg IV q24hr. 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   - could obtain gallium to see if lights up in hip to help distinguish btwn septic arthritis vs hematoma, pt would like to leave the hospital as she is returning back to [facility]. Fevers possibly secondary to PE/DVT  - PPD, send quantiferon   - malignancy workup per primary team   - f/u outpatient workup for necrosis of femoral head and possible hypercoagulable state that lead to PE     Please call with any questions or send a message on [employer] Teams  Spectra [phone]

## 2020-09-07 NOTE — PROGRESS NOTE ADULT - ASSESSMENT
ASSESSMENT AND PLAN:    The patient is a 64 year-old female with a PMH of HTN and hypothyroidism and a PSH of appendectomy and tonsillectomy presenting for shortness of breath, admitted for pulmonary embolism.    # High grade fever/ suspected  septic arthritis/ UTI   - c/w Ceftriaxone, ID consulted appreciated, will d/c Abx, place PPD and check QuantiFeron level  ( joint and blood Cx are negative)    - fever is likely non infectious, likely   due to acute DVT and PE   -  urine Cx positive for E.coli  - Tylenol PRN for fever      # Acute respiratory failure due to  submassive acute pulmonary embolism  - resolved   - s/p thrombectomy by  IR on 8/31  - pt is off oxygen and comfortable at rest now on RA  - started    Eliquis 5 mg po Q BID   - monitor pulse Ox   -  Lidoderm patch for pleuritic chest pain     # Acute diastolic CHF/ PHTN  - resolved   - c/w  Fluid restriction 1500 ml in 24 hours, daily weight  - low sodium diet, intake and output monitoring   - pt is clinically improving after thrombectomy   - troponin WNL   - monitor for fluid overload       #  PUD Gastric ulcer at pylorus/ Gastritis   - pt has recent EGD , report is in the paper chart  - biopsy was negative for H.Pylori / dysplasia    - Protonix 40 mg BID    - Carafate 1 gm Q 12 hour   - avoid NSAIDS   - No evidence of active bleed  - pt was consulted by GI, recommendations noted     # Iron deficiency anemia  - received IV Venofer while in the hospital   - no active bleeding noted   - monitor H/H, keep Hb above 7.5 and active type/cross  - check stool for occult blood       # H/o hypertension  - DASH diet   -on  Lisinopril     # Inability to walk/ spinal stenosis/ degenerative Dz in L-spine/ Left hip pain   - MRI findings noted , case d/w ortho attending  - s/p  arthrocentesis by IR   - pain control  - PT/rehab evaluation   - fall precautions   - OOB to chair     #Progress Note Handoff  Pending (specify): d/c Abx, check stool for occult blood , monitor H/H   Family discussion: I spoke with pt, she agreed with a plan of care   Disposition: will anticipate discharge in 24 hours ( home )

## 2020-09-07 NOTE — PROGRESS NOTE ADULT - SUBJECTIVE AND OBJECTIVE BOX
IOANA POTTER  64y, Female  Allergy: No Known Allergies      LOS  8d    CHIEF COMPLAINT: pulmonary embolism (06 Sep 2020 12:51)      INTERVAL EVENTS/HPI  - T(F): , Max: 101.9 (09-07-20 @ 04:37)  - Tolerating medication  - WBC Count: 8.54 (09-07-20 @ 05:40)  WBC Count: 9.38 (09-06-20 @ 06:53)  - Creatinine, Serum: 0.6 (09-07-20 @ 05:40)  Creatinine, Serum: 0.7 (09-06-20 @ 06:53)       ROS  ***    VITALS:  T(F): 99.7, Max: 101.9 (09-07-20 @ 04:37)  HR: 92  BP: 125/62  RR: 18Vital Signs Last 24 Hrs  T(C): 37.6 (07 Sep 2020 06:13), Max: 38.8 (07 Sep 2020 04:37)  T(F): 99.7 (07 Sep 2020 06:13), Max: 101.9 (07 Sep 2020 04:37)  HR: 92 (07 Sep 2020 06:13) (90 - 95)  BP: 125/62 (07 Sep 2020 04:37) (122/66 - 133/61)  BP(mean): --  RR: 18 (07 Sep 2020 06:13) (18 - 18)  SpO2: 95% (07 Sep 2020 06:13) (95% - 95%)    PHYSICAL EXAM:  ***    FH: Non-contributory  Social Hx: Non-contributory    TESTS & MEASUREMENTS:                        7.7    8.54  )-----------( 330      ( 07 Sep 2020 05:40 )             24.4     09-07    139  |  102  |  18  ----------------------------<  101<H>  3.9   |  24  |  0.6<L>    Ca    8.1<L>      07 Sep 2020 05:40  Phos  3.2     09-06  Mg     1.8     09-06      eGFR if Non African American: 95 mL/min/1.73M2 (09-07-20 @ 05:40)  eGFR if African American: 110 mL/min/1.73M2 (09-07-20 @ 05:40)          Culture - Blood (collected 09-05-20 @ 11:42)  Source: .Blood None  Preliminary Report (09-06-20 @ 19:01):    No growth to date.    Culture - Body Fluid with Gram Stain (collected 09-04-20 @ 10:30)  Source: .Body Fluid Joint Fluid, Left Hip  Gram Stain (09-04-20 @ 22:29):    No polymorphonuclear cells seen    No organisms seen  Preliminary Report (09-05-20 @ 17:11):    No growth    Culture - Blood (collected 09-03-20 @ 20:52)  Source: .Blood None  Preliminary Report (09-05-20 @ 04:01):    No growth to date.    Culture - Urine (collected 09-03-20 @ 18:00)  Source: .Urine Catheterized  Final Report (09-05-20 @ 20:47):    <10,000 CFU/mL Normal Urogenital Julianne    Culture - Urine (collected 09-03-20 @ 01:30)  Source: .Urine Clean Catch (Midstream)  Final Report (09-05-20 @ 18:36):    >100,000 CFU/ml Escherichia coli  Organism: Escherichia coli (09-05-20 @ 18:36)  Organism: Escherichia coli (09-05-20 @ 18:36)      -  Amikacin: S <=16      -  Amoxicillin/Clavulanic Acid: S <=8/4      -  Ampicillin: R >16 These ampicillin results predict results for amoxicillin      -  Ampicillin/Sulbactam: I 16/8 Enterobacter, Citrobacter, and Serratia may develop resistance during prolonged therapy (3-4 days)      -  Aztreonam: S <=4      -  Cefazolin: S <=2 (MIC_CL_COM_ENTERIC_CEFAZU) For uncomplicated UTI with K. pneumoniae, E. coli, or P. mirablis: HAKAN <=16 is sensitive and HAKAN >=32 is resistant. This also predicts results for oral agents cefaclor, cefdinir, cefpodoxime, cefprozil, cefuroxime axetil, cephalexin and locarbef for uncomplicated UTI. Note that some isolates may be susceptible to these agents while testing resistant to cefazolin.      -  Cefepime: S <=2      -  Cefoxitin: S <=8      -  Ceftriaxone: S <=1 Enterobacter, Citrobacter, and Serratia may develop resistance during prolonged therapy      -  Ciprofloxacin: S <=0.25      -  Ertapenem: S <=0.5      -  Gentamicin: S <=2      -  Imipenem: S <=1      -  Levofloxacin: S <=0.5      -  Meropenem: S <=1      -  Nitrofurantoin: S <=32 Should not be used to treat pyelonephritis      -  Piperacillin/Tazobactam: S <=8      -  Tigecycline: S <=2      -  Tobramycin: S <=2      -  Trimethoprim/Sulfamethoxazole: R >2/38      Method Type: [facility]            INFECTIOUS DISEASES TESTING  MRSA PCR Result.: Negative (09-05-20 @ 17:00)  COVID-19 PCR: NotDetec (08-30-20 @ 16:49)      INFLAMMATORY MARKERS      RADIOLOGY & ADDITIONAL TESTS:  I have personally reviewed the last available Chest xray  CXR      CT      CARDIOLOGY TESTING  Transthoracic Echocardiogram:    EXAM:  2-D ECHO (TTE) COMPLETE        PROCEDURE DATE:  08/31/2020      INTERPRETATION:  REPORT:  TRANSTHORACIC ECHOCARDIOGRAM REPORT        Patient Name:   IOANA POTTER Accession #: [id number]  Medical Rec #:  [id number]         Height:      63.0 in 160.0 cm  YOB: 1955        Weight:      185.0 lb 83.91 kg  Patient Age:    64 years          BSA:         1.87 m²  Patient Gender: F                 BP:          116/66 mmHg      Date of Exam:        8/31/2020 1:18:07 PM  Referring Physician: [id number] IRINA NAILS  Sonographer:         Felicia Griffin  Reading Physician:   Livier Colvin M.D.    Procedure:   2D Echo/Doppler/Color Doppler Complete.  Indications: R07.9 - Chest Pain, unspecified  Diagnosis:   Chest pain, unspecified - R07.9        Summary:   1. Normal global left ventricular systolic function.   2. LV Ejection Fraction by Hardin's Method with a biplane EF of 59 %.   3. Mild left ventricular hypertrophy.   4. Mildly increased LV wall thickness.   5. Spectral Doppler shows impaired relaxation pattern of left ventricular myocardial filling (Grade I diastolic dysfunction).   6. Normal left atrial size.   7. Normal right atrial size.   8. Structurally normal mitral valve, with normal leaflet excursion.   9. Trace mitral valve regurgitation.  10. Mild-moderate tricuspid regurgitation.  11. Sclerotic aortic valve with normal opening.  12. Mild pulmonic valve regurgitation.  13. Estimated pulmonary artery systolic pressure is 47.5 mmHg assuming a right atrial pressure of 5 mmHg, which is consistent with mild pulmonary hypertension.  14. LA volume Index is 14.6 ml/m² ml/m2.    PHYSICIAN INTERPRETATION:  Left Ventricle: The left ventricular internal cavity size is normal. Left ventricular wall thicknessis mildly increased. There is mild left ventricular hypertrophy. Global LV systolic function was normal. Spectral Doppler shows impaired relaxation pattern of left ventricular myocardial filling (Grade I diastolic dysfunction). Normal LV filling pressures.  Right Ventricle: Normal right ventricular size and function.  Left Atrium: Normal left atrial size. LA volume Index is 14.6 ml/m² ml/m2.  Right Atrium: Normal right atrial size.  Pericardium: There is no evidence of pericardial effusion.  Mitral Valve: Structurally normal mitral valve, with normal leaflet excursion. Trace mitral valve regurgitation is seen.  Tricuspid Valve: Structurally normal tricuspid valve, with normal leaflet excursion. Mild-moderate tricuspid regurgitation is visualized. Estimated pulmonary artery systolic pressure is 47.5 mmHg assuming a right atrial pressure of 5 mmHg, which is consistent with mild pulmonary hypertension.  Aortic Valve: The aortic valve is trileaflet. Sclerotic aortic valve with normal opening.No evidence of aortic valve regurgitation is seen.  Pulmonic Valve: Structurally normal pulmonic valve, with normal leaflet excursion. Mild pulmonic valve regurgitation.  Aorta: The aortic root and ascending aorta are structurally normal, with no evidence of dilitation.  Pulmonary Artery: The main pulmonary artery is normal in size.  Venous: The inferior vena cava was normal sized, with respiratory size variation greater than 50%.      2D AND M-MODE MEASUREMENTS (normal ranges within parentheses):  Left                  Normal   Aorta/Left             Normal  Ventricle:                     Atrium:  IVSd (2D):  1.13 cm  (0.7-1.1) AoV Cusp       1.76  (1.5-2.6)  LVPWd (2D): 0.72 cm  (0.7-1.1) Separation:     cm  LVIDd (2D): 3.98 cm  (3.4-5.7) Left Atrium    2.70  (1.9-4.0)  LVIDs (2D): 2.75 cm            (Mmode):        cm  LV FS (2D):  30.9 %   (>25%)   LA Volume      14.5  Relative      0.36    (<0.42)  Index         ml/m²  Wall                           Right  Thickness   Ventricle:                                 RVd (2D):      3.19 cm    SPECTRAL DOPPLER ANALYSIS:  LV DIASTOLIC FUNCTION:  MV Peak E: 0.49 m/s Decel Time: 270 msec  MV Peak A: 0.90 m/s  E/A Ratio: 0.54    LVOT Vmax: 1.31 m/s  LVOT VTI:  0.18 m  LVOTDiam: 1.77 cm    Mitral Valve:  MV P1/2 Time: 78.40 msec  MV Area, PHT: 2.81 cm²    Tricuspid Valve and PA/RV Systolic Pressure: TR Max Velocity: 3.26 m/s RA Pressure: 5 mmHg RVSP/PASP: 47.5 mmHg      [id number] Livier Colvin M.D., Electronically signedon 8/31/2020 at 4:35:15 PM            *** Final ***              LIVIER COLVIN M.D., RESIDENT RADIOLOGIST  This document has been electronically signed.  LIVIER COLVIN M.D., RESIDENT RADIOLOGIST  This document has been electronically signed. Aug [id number]  1:18PM             (08-31-20 @ 13:18)  12 Lead ECG:   Ventricular Rate 98 BPM    Atrial Rate 98 BPM    P-R Interval 128 ms    QRS Duration 90 ms    Q-T Interval 356 ms    QTC Calculation(Bezet) 454 ms    P Axis 65 degrees    R Axis 65 degrees    T Axis 4 degrees    Diagnosis Line Normal sinus rhythm  Nonspecific T wave abnormality  Low voltage QRS chest leads  Abnormal ECG    Confirmed by LIVIER COLVIN MD ([id number]) on 8/30/2020 8:20:21 PM (08-30-20 @ 17:44)      MEDICATIONS  apixaban 5 Oral every 12 hours  cefTRIAXone   IVPB 2000 IV Intermittent every 24 hours  chlorhexidine 4% Liquid 1 Topical <User Schedule>  levothyroxine 50 Oral daily  lisinopril 5 Oral daily  pantoprazole   Suspension 40 Oral two times a day  polyethylene glycol 3350 17 Oral daily  sucralfate 1 Oral every 12 hours      WEIGHT  Weight (kg): 87.3 (09-01-20 @ 15:49)  Creatinine, Serum: 0.6 mg/dL (09-07-20 @ 05:40)      ANTIBIOTICS:  cefTRIAXone   IVPB 2000 milliGRAM(s) IV Intermittent every 24 hours      All available historical records have been reviewed IOANA POTTER  64y, Female  Allergy: No Known Allergies      LOS  8d    CHIEF COMPLAINT: pulmonary embolism (06 Sep 2020 12:51)      INTERVAL EVENTS/HPI  - T(F): , Max: 101.9 (09-07-20 @ 04:37)  - Tolerating medication  - WBC Count: 8.54 (09-07-20 @ 05:40)  WBC Count: 9.38 (09-06-20 @ 06:53)  - Creatinine, Serum: 0.6 (09-07-20 @ 05:40)  Creatinine, Serum: 0.7 (09-06-20 @ 06:53)       ROS  General: Denies rigors, nightsweats  HEENT: Denies headache, rhinorrhea, sore throat, eye pain  CV: Denies CP, palpitations  PULM: Denies wheezing, hemoptysis  GI: Denies hematemesis, hematochezia, melena  : Denies discharge, hematuria  MSK: Denies arthralgias, myalgias  SKIN: Denies rash, lesions  NEURO: Denies paresthesias, weakness  PSYCH: Denies depression, anxiety     VITALS:  T(F): 99.7, Max: 101.9 (09-07-20 @ 04:37)  HR: 92  BP: 125/62  RR: 18Vital Signs Last 24 Hrs  T(C): 37.6 (07 Sep 2020 06:13), Max: 38.8 (07 Sep 2020 04:37)  T(F): 99.7 (07 Sep 2020 06:13), Max: 101.9 (07 Sep 2020 04:37)  HR: 92 (07 Sep 2020 06:13) (90 - 95)  BP: 125/62 (07 Sep 2020 04:37) (122/66 - 133/61)  BP(mean): --  RR: 18 (07 Sep 2020 06:13) (18 - 18)  SpO2: 95% (07 Sep 2020 06:13) (95% - 95%)    PHYSICAL EXAM:  Gen: NAD, resting in bed  HEENT: Normocephalic, atraumatic  Neck: supple, no lymphadenopathy  CV: Regular rate & regular rhythm  Lungs: decreased BS at bases, no fremitus  Abdomen: Soft, BS present  Ext: Warm, well perfused  Neuro: non focal, awake  Skin: no rash, no erythema  Lines: no phlebitis     FH: Non-contributory  Social Hx: Non-contributory    TESTS & MEASUREMENTS:                        7.7    8.54  )-----------( 330      ( 07 Sep 2020 05:40 )             24.4     09-07    139  |  102  |  18  ----------------------------<  101<H>  3.9   |  24  |  0.6<L>    Ca    8.1<L>      07 Sep 2020 05:40  Phos  3.2     09-06  Mg     1.8     09-06      eGFR if Non African American: 95 mL/min/1.73M2 (09-07-20 @ 05:40)  eGFR if African American: 110 mL/min/1.73M2 (09-07-20 @ 05:40)          Culture - Blood (collected 09-05-20 @ 11:42)  Source: .Blood None  Preliminary Report (09-06-20 @ 19:01):    No growth to date.    Culture - Body Fluid with Gram Stain (collected 09-04-20 @ 10:30)  Source: .Body Fluid Joint Fluid, Left Hip  Gram Stain (09-04-20 @ 22:29):    No polymorphonuclear cells seen    No organisms seen  Preliminary Report (09-05-20 @ 17:11):    No growth    Culture - Blood (collected 09-03-20 @ 20:52)  Source: .Blood None  Preliminary Report (09-05-20 @ 04:01):    No growth to date.    Culture - Urine (collected 09-03-20 @ 18:00)  Source: .Urine Catheterized  Final Report (09-05-20 @ 20:47):    <10,000 CFU/mL Normal Urogenital Julianne    Culture - Urine (collected 09-03-20 @ 01:30)  Source: .Urine Clean Catch (Midstream)  Final Report (09-05-20 @ 18:36):    >100,000 CFU/ml Escherichia coli  Organism: Escherichia coli (09-05-20 @ 18:36)  Organism: Escherichia coli (09-05-20 @ 18:36)      -  Amikacin: S <=16      -  Amoxicillin/Clavulanic Acid: S <=8/4      -  Ampicillin: R >16 These ampicillin results predict results for amoxicillin      -  Ampicillin/Sulbactam: I 16/8 Enterobacter, Citrobacter, and Serratia may develop resistance during prolonged therapy (3-4 days)      -  Aztreonam: S <=4      -  Cefazolin: S <=2 (MIC_CL_COM_ENTERIC_CEFAZU) For uncomplicated UTI with K. pneumoniae, E. coli, or P. mirablis: HAKAN <=16 is sensitive and HAKAN >=32 is resistant. This also predicts results for oral agents cefaclor, cefdinir, cefpodoxime, cefprozil, cefuroxime axetil, cephalexin and locarbef for uncomplicated UTI. Note that some isolates may be susceptible to these agents while testing resistant to cefazolin.      -  Cefepime: S <=2      -  Cefoxitin: S <=8      -  Ceftriaxone: S <=1 Enterobacter, Citrobacter, and Serratia may develop resistance during prolonged therapy      -  Ciprofloxacin: S <=0.25      -  Ertapenem: S <=0.5      -  Gentamicin: S <=2      -  Imipenem: S <=1      -  Levofloxacin: S <=0.5      -  Meropenem: S <=1      -  Nitrofurantoin: S <=32 Should not be used to treat pyelonephritis      -  Piperacillin/Tazobactam: S <=8      -  Tigecycline: S <=2      -  Tobramycin: S <=2      -  Trimethoprim/Sulfamethoxazole: R >2/38      Method Type: [facility]            INFECTIOUS DISEASES TESTING  MRSA PCR Result.: Negative (09-05-20 @ 17:00)  COVID-19 PCR: NotDetec (08-30-20 @ 16:49)      INFLAMMATORY MARKERS      RADIOLOGY & ADDITIONAL TESTS:  I have personally reviewed the last available Chest xray  CXR      CT      CARDIOLOGY TESTING  Transthoracic Echocardiogram:    EXAM:  2-D ECHO (TTE) COMPLETE        PROCEDURE DATE:  08/31/2020      INTERPRETATION:  REPORT:  TRANSTHORACIC ECHOCARDIOGRAM REPORT        Patient Name:   IOANA POTTER Accession #: [id number]  Medical Rec #:  [id number]         Height:      63.0 in 160.0 cm  YOB: 1955        Weight:      185.0 lb 83.91 kg  Patient Age:    64 years          BSA:         1.87 m²  Patient Gender: F                 BP:          116/66 mmHg      Date of Exam:        8/31/2020 1:18:07 PM  Referring Physician: [id number] NAILS  Sonographer:         Felicia Griffin  Reading Physician:   Livier Colvin M.D.    Procedure:   2D Echo/Doppler/Color Doppler Complete.  Indications: R07.9 - Chest Pain, unspecified  Diagnosis:   Chest pain, unspecified - R07.9        Summary:   1. Normal global left ventricular systolic function.   2. LV Ejection Fraction by Hardin's Method with a biplane EF of 59 %.   3. Mild left ventricular hypertrophy.   4. Mildly increased LV wall thickness.   5. Spectral Doppler shows impaired relaxation pattern of left ventricular myocardial filling (Grade I diastolic dysfunction).   6. Normal left atrial size.   7. Normal right atrial size.   8. Structurally normal mitral valve, with normal leaflet excursion.   9. Trace mitral valve regurgitation.  10. Mild-moderate tricuspid regurgitation.  11. Sclerotic aortic valve with normal opening.  12. Mild pulmonic valve regurgitation.  13. Estimated pulmonary artery systolic pressure is 47.5 mmHg assuming a right atrial pressure of 5 mmHg, which is consistent with mild pulmonary hypertension.  14. LA volume Index is 14.6 ml/m² ml/m2.    PHYSICIAN INTERPRETATION:  Left Ventricle: The left ventricular internal cavity size is normal. Left ventricular wall thicknessis mildly increased. There is mild left ventricular hypertrophy. Global LV systolic function was normal. Spectral Doppler shows impaired relaxation pattern of left ventricular myocardial filling (Grade I diastolic dysfunction). Normal LV filling pressures.  Right Ventricle: Normal right ventricular size and function.  Left Atrium: Normal left atrial size. LA volume Index is 14.6 ml/m² ml/m2.  Right Atrium: Normal right atrial size.  Pericardium: There is no evidence of pericardial effusion.  Mitral Valve: Structurally normal mitral valve, with normal leaflet excursion. Trace mitral valve regurgitation is seen.  Tricuspid Valve: Structurally normal tricuspid valve, with normal leaflet excursion. Mild-moderate tricuspid regurgitation is visualized. Estimated pulmonary artery systolic pressure is 47.5 mmHg assuming a right atrial pressure of 5 mmHg, which is consistent with mild pulmonary hypertension.  Aortic Valve: The aortic valve is trileaflet. Sclerotic aortic valve with normal opening.No evidence of aortic valve regurgitation is seen.  Pulmonic Valve: Structurally normal pulmonic valve, with normal leaflet excursion. Mild pulmonic valve regurgitation.  Aorta: The aortic root and ascending aorta are structurally normal, with no evidence of dilitation.  Pulmonary Artery: The main pulmonary artery is normal in size.  Venous: The inferior vena cava was normal sized, with respiratory size variation greater than 50%.      2D AND M-MODE MEASUREMENTS (normal ranges within parentheses):  Left                  Normal   Aorta/Left             Normal  Ventricle:                     Atrium:  IVSd (2D):  1.13 cm  (0.7-1.1) AoV Cusp       1.76  (1.5-2.6)  LVPWd (2D): 0.72 cm  (0.7-1.1) Separation:     cm  LVIDd (2D): 3.98 cm  (3.4-5.7) Left Atrium    2.70  (1.9-4.0)  LVIDs (2D): 2.75 cm            (Mmode):        cm  LV FS (2D):  30.9 %   (>25%)   LA Volume      14.5  Relative      0.36    (<0.42)  Index         ml/m²  Wall                           Right  Thickness   Ventricle:                                 RVd (2D):      3.19 cm    SPECTRAL DOPPLER ANALYSIS:  LV DIASTOLIC FUNCTION:  MV Peak E: 0.49 m/s Decel Time: 270 msec  MV Peak A: 0.90 m/s  E/A Ratio: 0.54    LVOT Vmax: 1.31 m/s  LVOT VTI:  0.18 m  LVOTDiam: 1.77 cm    Mitral Valve:  MV P1/2 Time: 78.40 msec  MV Area, PHT: 2.81 cm²    Tricuspid Valve and PA/RV Systolic Pressure: TR Max Velocity: 3.26 m/s RA Pressure: 5 mmHg RVSP/PASP: 47.5 mmHg      [id number] Livier Colvin M.D., Electronically signedon 8/31/2020 at 4:35:15 PM            *** Final ***              LIVIER COLVIN M.D., RESIDENT RADIOLOGIST  This document has been electronically signed.  LIVIER COLVIN M.D., RESIDENT RADIOLOGIST  This document has been electronically signed. Aug [id number]  1:18PM             (08-31-20 @ 13:18)  12 Lead ECG:   Ventricular Rate 98 BPM    Atrial Rate 98 BPM    P-R Interval 128 ms    QRS Duration 90 ms    Q-T Interval 356 ms    QTC Calculation(Bezet) 454 ms    P Axis 65 degrees    R Axis 65 degrees    T Axis 4 degrees    Diagnosis Line Normal sinus rhythm  Nonspecific T wave abnormality  Low voltage QRS chest leads  Abnormal ECG    Confirmed by LIVIER COLVIN MD ([id number]) on 8/30/2020 8:20:21 PM (08-30-20 @ 17:44)      MEDICATIONS  apixaban 5 Oral every 12 hours  cefTRIAXone   IVPB 2000 IV Intermittent every 24 hours  chlorhexidine 4% Liquid 1 Topical <User Schedule>  levothyroxine 50 Oral daily  lisinopril 5 Oral daily  pantoprazole   Suspension 40 Oral two times a day  polyethylene glycol 3350 17 Oral daily  sucralfate 1 Oral every 12 hours      WEIGHT  Weight (kg): 87.3 (09-01-20 @ 15:49)  Creatinine, Serum: 0.6 mg/dL (09-07-20 @ 05:40)      ANTIBIOTICS:  cefTRIAXone   IVPB 2000 milliGRAM(s) IV Intermittent every 24 hours      All available historical records have been reviewed

## 2020-09-07 NOTE — PROGRESS NOTE ADULT - SUBJECTIVE AND OBJECTIVE BOX
IOANA POTTER 64y Female  MRN#: [id number]       SUBJECTIVE  Patient is a 64y old woman with PMH of HTN, PUD and hypothyroidism coming in for shortness of breath. Found to have bilateral PE on CT angio chest. Patient underwent thrombectomy. Hospital course complicated by necrosis of femoral head found on MRI. Arthrocentesis performed but was negative. Patient with fevers throughout stay but no infectious source as of now.    Patient examined at bedside. Walking, eating. No complaints. Febrile last night to 101.7.      OBJECTIVE  PAST MEDICAL & SURGICAL HISTORY  HTN (hypertension)  Gastric ulcer  S/P tonsillectomy  S/P appendectomy    ALLERGIES:  No Known Allergies    MEDICATIONS:  STANDING MEDICATIONS  apixaban 5 milliGRAM(s) Oral every 12 hours  chlorhexidine 4% Liquid 1 Application(s) Topical <User Schedule>  levothyroxine 50 MICROGram(s) Oral daily  lisinopril 5 milliGRAM(s) Oral daily  pantoprazole   Suspension 40 milliGRAM(s) Oral two times a day  polyethylene glycol 3350 17 Gram(s) Oral daily  sucralfate 1 Gram(s) Oral every 12 hours    PRN MEDICATIONS  acetaminophen   Tablet .. 650 milliGRAM(s) Oral every 6 hours PRN  bisacodyl 5 milliGRAM(s) Oral every 12 hours PRN  lactulose Syrup 10 Gram(s) Oral two times a day PRN  lidocaine   Patch 1 Patch Transdermal daily PRN  oxycodone    5 mG/acetaminophen 325 mG 1 Tablet(s) Oral every 6 hours PRN      VITAL SIGNS: Last 24 Hours  T(C): 37.6 (07 Sep 2020 06:13), Max: 38.8 (07 Sep 2020 04:37)  T(F): 99.7 (07 Sep 2020 06:13), Max: 101.9 (07 Sep 2020 04:37)  HR: 92 (07 Sep 2020 06:13) (90 - 95)  BP: 125/62 (07 Sep 2020 04:37) (122/66 - 125/62)  BP(mean): --  RR: 18 (07 Sep 2020 06:13) (18 - 18)  SpO2: 95% (07 Sep 2020 06:13) (95% - 95%)    LABS:                        7.7    8.54  )-----------( 330      ( 07 Sep 2020 05:40 )             24.4     09-07    139  |  102  |  18  ----------------------------<  101<H>  3.9   |  24  |  0.6<L>    Ca    8.1<L>      07 Sep 2020 05:40  Phos  3.2     09-06  Mg     1.8     09-06                Culture - Blood (collected 05 Sep 2020 11:42)  Source: .Blood None  Preliminary Report (06 Sep 2020 19:01):    No growth to date.          RADIOLOGY:      PHYSICAL EXAM:    GEN: NAD, Resting comfortably in bed  PULM: Clear to auscultation bilaterally, No wheezes  CVS: RRR, S1-S2, no murmurs  ABD: Soft, non-tender, non-distended, no guarding  EXT: No edema  NEURO: A&Ox3, no focal deficits        ASSESSMENT & PLAN    Patient is a 63 y/o woman with PMH of HTN, PUD, and hypothyroidism presenting for SOB. Found to have PE. Course complicated by necrosis of femoral head; cause unknown.     1. Pulmonary embolism  -Had thrombectomy on 8/31  -Currently on room air, saturating well  -Lovenox 90mg BID switched to Eliquis 5mg BID; will discharge on this  -Lidoderm patch for chest pain    2. Femoral head necrosis   -Infectious vs other  -Arthrocentesis results negative  -ID consulted  -Will perform Gallium scan  -PPD placed; quantiferon ordered (suspicion for tuberculous arthritis)  -Can D/C antibiotics for now  -PT    3. Fever  -ID consulted; likely non-infectious  -Was on rocephin for E.coli; will d/c  -Repeat UCX, BCX negative     4. CHF  -Fluid restriction 1.5L QD  -Daily weights  -I+O    5, PUD  -EGD outpatient showing deep ulcer  -Protonix 40 BID  -Carafate 1gm BID  -CBC  -Type and screen     6. Iron Deficiency anemia  -Monitor hemoglobin  -Transfuse if <7.5    7. HTN  -DASH  -Lisinopril    8. Spinal stenosis  -PT/Rehab  -Pain control  -Fall precautions

## 2020-09-08 LAB
ALBUMIN SERPL ELPH-MCNC: 2.9 G/DL — LOW (ref 3.5–5.2)
ALP SERPL-CCNC: 168 U/L — HIGH (ref 30–115)
ALT FLD-CCNC: 107 U/L — HIGH (ref 0–41)
ANION GAP SERPL CALC-SCNC: 11 MMOL/L — SIGNIFICANT CHANGE UP (ref 7–14)
ANISOCYTOSIS BLD QL: SLIGHT — SIGNIFICANT CHANGE UP
AST SERPL-CCNC: 137 U/L — HIGH (ref 0–41)
BASOPHILS # BLD AUTO: 0 K/UL — SIGNIFICANT CHANGE UP (ref 0–0.2)
BASOPHILS NFR BLD AUTO: 0 % — SIGNIFICANT CHANGE UP (ref 0–1)
BILIRUB SERPL-MCNC: 0.7 MG/DL — SIGNIFICANT CHANGE UP (ref 0.2–1.2)
BUN SERPL-MCNC: 16 MG/DL — SIGNIFICANT CHANGE UP (ref 10–20)
CALCIUM SERPL-MCNC: 8.1 MG/DL — LOW (ref 8.5–10.1)
CHLORIDE SERPL-SCNC: 102 MMOL/L — SIGNIFICANT CHANGE UP (ref 98–110)
CO2 SERPL-SCNC: 23 MMOL/L — SIGNIFICANT CHANGE UP (ref 17–32)
CREAT SERPL-MCNC: 0.7 MG/DL — SIGNIFICANT CHANGE UP (ref 0.7–1.5)
EOSINOPHIL # BLD AUTO: 0.08 K/UL — SIGNIFICANT CHANGE UP (ref 0–0.7)
EOSINOPHIL NFR BLD AUTO: 0.9 % — SIGNIFICANT CHANGE UP (ref 0–8)
GIANT PLATELETS BLD QL SMEAR: PRESENT — SIGNIFICANT CHANGE UP
GLUCOSE SERPL-MCNC: 100 MG/DL — HIGH (ref 70–99)
HCT VFR BLD CALC: 26.4 % — LOW (ref 37–47)
HGB BLD-MCNC: 8.2 G/DL — LOW (ref 12–16)
LYMPHOCYTES # BLD AUTO: 1.72 K/UL — SIGNIFICANT CHANGE UP (ref 1.2–3.4)
LYMPHOCYTES # BLD AUTO: 19.6 % — LOW (ref 20.5–51.1)
MACROCYTES BLD QL: SLIGHT — SIGNIFICANT CHANGE UP
MAGNESIUM SERPL-MCNC: 1.7 MG/DL — LOW (ref 1.8–2.4)
MANUAL SMEAR VERIFICATION: SIGNIFICANT CHANGE UP
MCHC RBC-ENTMCNC: 29.1 PG — SIGNIFICANT CHANGE UP (ref 27–31)
MCHC RBC-ENTMCNC: 31.1 G/DL — LOW (ref 32–37)
MCV RBC AUTO: 93.6 FL — SIGNIFICANT CHANGE UP (ref 81–99)
MICROCYTES BLD QL: SLIGHT — SIGNIFICANT CHANGE UP
MONOCYTES # BLD AUTO: 0.39 K/UL — SIGNIFICANT CHANGE UP (ref 0.1–0.6)
MONOCYTES NFR BLD AUTO: 4.5 % — SIGNIFICANT CHANGE UP (ref 1.7–9.3)
NEUTROPHILS # BLD AUTO: 6.5 K/UL — SIGNIFICANT CHANGE UP (ref 1.4–6.5)
NEUTROPHILS NFR BLD AUTO: 74.1 % — SIGNIFICANT CHANGE UP (ref 42.2–75.2)
PLAT MORPH BLD: ABNORMAL
PLATELET # BLD AUTO: 379 K/UL — SIGNIFICANT CHANGE UP (ref 130–400)
POIKILOCYTOSIS BLD QL AUTO: SLIGHT — SIGNIFICANT CHANGE UP
POLYCHROMASIA BLD QL SMEAR: SLIGHT — SIGNIFICANT CHANGE UP
POTASSIUM SERPL-MCNC: 4.3 MMOL/L — SIGNIFICANT CHANGE UP (ref 3.5–5)
POTASSIUM SERPL-SCNC: 4.3 MMOL/L — SIGNIFICANT CHANGE UP (ref 3.5–5)
PROT SERPL-MCNC: 5.8 G/DL — LOW (ref 6–8)
RBC # BLD: 2.82 M/UL — LOW (ref 4.2–5.4)
RBC # FLD: 13.4 % — SIGNIFICANT CHANGE UP (ref 11.5–14.5)
RBC BLD AUTO: ABNORMAL
SODIUM SERPL-SCNC: 136 MMOL/L — SIGNIFICANT CHANGE UP (ref 135–146)
STOMATOCYTES BLD QL SMEAR: SLIGHT — SIGNIFICANT CHANGE UP
VARIANT LYMPHS # BLD: 0.9 % — SIGNIFICANT CHANGE UP (ref 0–5)
WBC # BLD: 8.77 K/UL — SIGNIFICANT CHANGE UP (ref 4.8–10.8)
WBC # FLD AUTO: 8.77 K/UL — SIGNIFICANT CHANGE UP (ref 4.8–10.8)

## 2020-09-08 PROCEDURE — 76705 ECHO EXAM OF ABDOMEN: CPT | Mod: 26

## 2020-09-08 PROCEDURE — 99233 SBSQ HOSP IP/OBS HIGH 50: CPT

## 2020-09-08 PROCEDURE — 74177 CT ABD & PELVIS W/CONTRAST: CPT | Mod: 26

## 2020-09-08 RX ORDER — OMEPRAZOLE 10 MG/1
1 CAPSULE, DELAYED RELEASE ORAL
Qty: 0 | Refills: 0 | DISCHARGE

## 2020-09-08 RX ORDER — ACETAMINOPHEN 500 MG
650 TABLET ORAL EVERY 6 HOURS
Refills: 0 | Status: DISCONTINUED | OUTPATIENT
Start: 2020-09-08 | End: 2020-09-17

## 2020-09-08 RX ORDER — ACETAMINOPHEN 500 MG
1 TABLET ORAL
Qty: 120 | Refills: 0
Start: 2020-09-08 | End: 2020-10-07

## 2020-09-08 RX ORDER — LEVOTHYROXINE SODIUM 125 MCG
1 TABLET ORAL
Qty: 0 | Refills: 0 | DISCHARGE

## 2020-09-08 RX ORDER — LIDOCAINE 4 G/100G
1 CREAM TOPICAL
Qty: 30 | Refills: 0
Start: 2020-09-08 | End: 2020-10-07

## 2020-09-08 RX ORDER — MAGNESIUM OXIDE 400 MG ORAL TABLET 241.3 MG
400 TABLET ORAL ONCE
Refills: 0 | Status: COMPLETED | OUTPATIENT
Start: 2020-09-08 | End: 2020-09-08

## 2020-09-08 RX ORDER — SUCRALFATE 1 G
1 TABLET ORAL
Qty: 60 | Refills: 0
Start: 2020-09-08 | End: 2020-10-07

## 2020-09-08 RX ORDER — APIXABAN 2.5 MG/1
1 TABLET, FILM COATED ORAL
Qty: 60 | Refills: 0
Start: 2020-09-08 | End: 2020-10-07

## 2020-09-08 RX ORDER — ACETAMINOPHEN 500 MG
2 TABLET ORAL
Qty: 0 | Refills: 0 | DISCHARGE
Start: 2020-09-08

## 2020-09-08 RX ORDER — MENTHOL AND CAPSAICIN .0375; 5 G/100G; G/100G
1 PATCH TOPICAL
Qty: 0 | Refills: 0 | DISCHARGE

## 2020-09-08 RX ORDER — LISINOPRIL 2.5 MG/1
1 TABLET ORAL
Qty: 30 | Refills: 0
Start: 2020-09-08 | End: 2020-10-07

## 2020-09-08 RX ORDER — POLYETHYLENE GLYCOL 3350 17 G/17G
17 POWDER, FOR SOLUTION ORAL
Qty: 510 | Refills: 0
Start: 2020-09-08 | End: 2020-10-07

## 2020-09-08 RX ORDER — LEVOTHYROXINE SODIUM 125 MCG
1 TABLET ORAL
Qty: 30 | Refills: 0
Start: 2020-09-08 | End: 2020-10-07

## 2020-09-08 RX ORDER — SODIUM CHLORIDE 9 MG/ML
1000 INJECTION INTRAMUSCULAR; INTRAVENOUS; SUBCUTANEOUS
Refills: 0 | Status: DISCONTINUED | OUTPATIENT
Start: 2020-09-08 | End: 2020-09-16

## 2020-09-08 RX ORDER — PANTOPRAZOLE SODIUM 20 MG/1
1 TABLET, DELAYED RELEASE ORAL
Qty: 60 | Refills: 0
Start: 2020-09-08 | End: 2020-10-07

## 2020-09-08 RX ORDER — APIXABAN 2.5 MG/1
1 TABLET, FILM COATED ORAL
Qty: 180 | Refills: 0
Start: 2020-09-08 | End: 2020-12-06

## 2020-09-08 RX ADMIN — CHLORHEXIDINE GLUCONATE 1 APPLICATION(S): 213 SOLUTION TOPICAL at 05:37

## 2020-09-08 RX ADMIN — APIXABAN 5 MILLIGRAM(S): 2.5 TABLET, FILM COATED ORAL at 05:37

## 2020-09-08 RX ADMIN — SODIUM CHLORIDE 75 MILLILITER(S): 9 INJECTION INTRAMUSCULAR; INTRAVENOUS; SUBCUTANEOUS at 17:50

## 2020-09-08 RX ADMIN — Medication 50 MICROGRAM(S): at 05:37

## 2020-09-08 RX ADMIN — PANTOPRAZOLE SODIUM 40 MILLIGRAM(S): 20 TABLET, DELAYED RELEASE ORAL at 17:02

## 2020-09-08 RX ADMIN — APIXABAN 5 MILLIGRAM(S): 2.5 TABLET, FILM COATED ORAL at 17:02

## 2020-09-08 RX ADMIN — Medication 1 GRAM(S): at 17:02

## 2020-09-08 RX ADMIN — POLYETHYLENE GLYCOL 3350 17 GRAM(S): 17 POWDER, FOR SOLUTION ORAL at 11:19

## 2020-09-08 RX ADMIN — LISINOPRIL 5 MILLIGRAM(S): 2.5 TABLET ORAL at 05:37

## 2020-09-08 RX ADMIN — Medication 1 GRAM(S): at 05:37

## 2020-09-08 RX ADMIN — PANTOPRAZOLE SODIUM 40 MILLIGRAM(S): 20 TABLET, DELAYED RELEASE ORAL at 05:38

## 2020-09-08 NOTE — PROGRESS NOTE ADULT - SUBJECTIVE AND OBJECTIVE BOX
64 year-old female with a PMH of PUD, HTN and hypothyroidism and a PSH of appendectomy and tonsillectomy presenting for shortness of breath, weakness and dizziness. The patient was found to have submassive pulmonary embolism and underwent thrombectomy on 8/31/20 with IR. The patient was anticoagulated with Heparin ggt. She was followed by GI for PUD.  Pt was downgraded to telemetry from ICU on 9/1/20. While in the hospital pt was diagnosed with iron deficiency amenia.   MRI of the left hip showed significant joint effusion, case discussed with ortho attending, IR consulted , pt underwent arthrocentesis. .  While in the hospital pt is spiking fever, most likely noninfectious, all Cx came back negative, pt completed course of IV Abx. She developed transaminitis likely due to frequent Tylenol use, will get RUQ US today and change Tylenol to rectal suppositories.   Today pt feels better today, she was able to walk with a walker.       PAST MEDICAL & SURGICAL HISTORY  HTN (hypertension)  Gastric ulcer  S/P tonsillectomy  S/P appendectomy      ALLERGIES:  No Known Allergies    Vital Signs Last 24 Hrs  T(C): 37.3 (08 Sep 2020 12:40), Max: 38.3 (08 Sep 2020 04:42)  T(F): 99.2 (08 Sep 2020 12:40), Max: 101 (08 Sep 2020 04:42)  HR: 90 (08 Sep 2020 12:40) (90 - 94)  BP: 136/65 (08 Sep 2020 12:40) (108/68 - 137/75)  BP(mean): --  RR: 18 (08 Sep 2020 12:40) (18 - 18)  SpO2: 98% (07 Sep 2020 21:42) (97% - 98%)    PHYSICAL EXAM:  GEN: No acute distress  HEENT: normocephalic, atraumatic  LUNGS: decreased breath sounds at bases   HEART: S1/S2 present. RRR, no murmurs  ABD: Soft, non-tender, non-distended. Bowel sounds present  EXT: NC/NC/NE/2+PP/FRANCES, decreased ROM in LLE due to pain   NEURO: AAOX3, normal affect, no focal neuro deficit       LABS:                                   8.2    8.77  )-----------( 379      ( 08 Sep 2020 06:34 )             26.4   09-08    136  |  102  |  16  ----------------------------<  100<H>  4.3   |  23  |  0.7    Ca    8.1<L>      08 Sep 2020 06:34  Mg     1.7     09-08    TPro  5.8<L>  /  Alb  2.9<L>  /  TBili  0.7  /  DBili  x   /  AST  137<H>  /  ALT  107<H>  /  AlkPhos  168<H>  09-08    Culture - Body Fluid with Gram Stain (09.04.20 @ 10:30)    Gram Stain:   No polymorphonuclear cells seen  No organisms seen    Specimen Source: .Body Fluid Joint Fluid, Left Hip    Culture - Blood (09.03.20 @ 20:52)    Specimen Source: .Blood None    Culture Results:   No growth to date.    Culture - Urine (09.03.20 @ 01:30)    Specimen Source: .Urine Clean Catch (Midstream)    Culture Results:   >100,000 CFU/ml Escherichia coli      RADIOLOGY:  < from: [facility] Duplex Lower Ext Vein Scan, Bilat (08.31.20 @ 17:39) >  Impression:    No evidence of deep venous thrombosis or superficial thrombophlebitis in the right lower extremity.    Acute left popliteal, posterior tibial peroneal vein DVT    < end of copied text >    < from: IR Procedure (08.31.20 @ 12:48) >  FINDINGS:    1. Preliminary ultrasound demonstrates patent right common femoral vein.    2. Right pulmonary artery angiogram: Extensive filling defects within the right main pulmonary artery extending into the upper and lower lobes.    3. Left pulmonary artery angiogram: Small filling defects within the left lower lobe pulmonary arterial segmental branches with oligemia.    4. Successful mechanical suction thrombectomy in right main pulmonary artery as well as left lower lobe segmental pulmonary artery utilizing Inari suction thrombectomy catheter.    < end of copied text >  < from: Transthoracic Echocardiogram (08.31.20 @ 13:18) >  Summary:   1. Normal global left ventricular systolic function.   2. LV Ejection Fraction by Hardin's Method with a biplane EF of 59 %.   3. Mild left ventricular hypertrophy.   4. Mildly increased LV wall thickness.   5. Spectral Doppler shows impaired relaxation pattern of left ventricular myocardial filling (Grade I diastolic dysfunction).   6. Normal left atrial size.   7. Normal right atrial size.   8. Structurally normal mitral valve, with normal leaflet excursion.   9. Trace mitral valve regurgitation.  10. Mild-moderate tricuspid regurgitation.  11. Sclerotic aortic valve with normal opening.  12. Mild pulmonic valve regurgitation.  13. Estimated pulmonary artery systolic pressure is 47.5 mmHg assuming a right atrial pressure of 5 mmHg, which is consistent with mild pulmonary hypertension.  14. LA volume Index is 14.6 ml/m² ml/m2.    < from: MR Hip w/wo IV Cont, Left (09.02.20 @ 19:42) >  IMPRESSION:    1. 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 Associated stress related sclerosis/edema in the left femoral neck and left acetabulum. Extensive surrounding edema/inflammation. Given these findings, septic arthritis cannot be excluded.  2. Large heterogeneous joint effusion may be due to internal hemorrhage.  3. Apparent tendon tears as above.    < end of copied text >    S/p image guided left hip aspiration, obtaining approx 5cc thick purulent fluid using a 5Fr Yueh needle. Specimen given to primary team resident.    MEDICATIONS  (STANDING):  apixaban 5 milliGRAM(s) Oral every 12 hours  chlorhexidine 4% Liquid 1 Application(s) Topical <User Schedule>  levothyroxine 50 MICROGram(s) Oral daily  lisinopril 5 milliGRAM(s) Oral daily  magnesium oxide 400 milliGRAM(s) Oral once  pantoprazole   Suspension 40 milliGRAM(s) Oral two times a day  polyethylene glycol 3350 17 Gram(s) Oral daily  sucralfate 1 Gram(s) Oral every 12 hours    MEDICATIONS  (PRN):  acetaminophen   Tablet .. 650 milliGRAM(s) Oral every 6 hours PRN Temp greater or equal to 38.5C (101.3F)  bisacodyl 5 milliGRAM(s) Oral every 12 hours PRN Constipation  lactulose Syrup 10 Gram(s) Oral two times a day PRN constipation  lidocaine   Patch 1 Patch Transdermal daily PRN chest pain

## 2020-09-08 NOTE — DISCHARGE NOTE PROVIDER - NSDCMRMEDTOKEN_GEN_ALL_CORE_FT
acetaminophen 325 mg oral tablet: 2 tab(s) orally every 6 hours, As needed, Temp greater or equal to 38.5C (101.3F)  apixaban 5 mg oral tablet: 1 tab(s) orally every 12 hours  levothyroxine 50 mcg (0.05 mg) oral tablet: 1 tab(s) orally once a day  lidocaine 5% topical film: Apply topically to affected area once a day   lisinopril 5 mg oral tablet: 1 tab(s) orally once a day  pantoprazole 40 mg oral granule, delayed release: 1 cap(s) orally 2 times a day   polyethylene glycol 3350 oral powder for reconstitution: 17 gram(s) orally once a day  sucralfate 1 g oral tablet: 1 tab(s) orally every 12 hours acetaminophen 650 mg rectal suppository: 1 suppository(ies) rectal every 6 hours, As needed, Temp greater or equal to 38C (100.4F)  amoxicillin 500 mg oral tablet: 2 tab(s) orally 3 times a day   apixaban 5 mg oral tablet: 1 tab(s) orally every 12 hours  levothyroxine 50 mcg (0.05 mg) oral tablet: 1 tab(s) orally once a day  lisinopril 5 mg oral tablet: 1 tab(s) orally once a day  metroNIDAZOLE 500 mg oral tablet: 1 tab(s) orally 3 times a day   pantoprazole 40 mg oral granule, delayed release: 1 cap(s) orally 2 times a day   polyethylene glycol 3350 oral powder for reconstitution: 17 gram(s) orally once a day  Salonpas 0.025%-1.25% topical film: Apply topically to affected area 3 times a day  sucralfate 1 g oral tablet: 1 tab(s) orally every 12 hours   amoxicillin 500 mg oral tablet: 2 tab(s) orally 3 times a day   apixaban 5 mg oral tablet: 1 tab(s) orally every 12 hours  levothyroxine 50 mcg (0.05 mg) oral tablet: 1 tab(s) orally once a day  lisinopril 5 mg oral tablet: 1 tab(s) orally once a day  metroNIDAZOLE 500 mg oral tablet: 1 tab(s) orally 3 times a day   pantoprazole 40 mg oral granule, delayed release: 1 cap(s) orally 2 times a day   polyethylene glycol 3350 oral powder for reconstitution: 17 gram(s) orally once a day  Salonpas 0.025%-1.25% topical film: Apply topically to affected area 3 times a day  sucralfate 1 g oral tablet: 1 tab(s) orally every 12 hours

## 2020-09-08 NOTE — PROGRESS NOTE ADULT - ASSESSMENT
ASSESSMENT AND PLAN:    The patient is a 64 year-old female with a PMH of HTN and hypothyroidism and a PSH of appendectomy and tonsillectomy presenting for shortness of breath, admitted for pulmonary embolism.    # Noninfectious  fever  - completed  Ceftriaxone for UTI  - ID consult appreciated, recommendations noted   ( joint and blood Cx are negative)    - will read PPD in 24 hours ( pt has no induration /erythema at the injections site )   - fever is likely non infectious, likely   due to acute DVT and PE   - Tylenol PRN for fever, will change po to suppositories     # transaminitis   - likely due to  frequent Tylenol use   - will get RUQ US today and change Tylenol to suppositories   - monitor LFTs       # Acute respiratory failure due to  submassive acute pulmonary embolism  - resolved   - s/p thrombectomy by  IR on 8/31  - pt is off oxygen and comfortable at rest now on RA  - c/w    Eliquis 5 mg po Q BID   - monitor pulse Ox   -  Lidoderm patch for pleuritic chest pain     # Acute diastolic CHF/ PHTN  - resolved   - c/w  Fluid restriction 1500 ml in 24 hours, daily weight  - low sodium diet, intake and output monitoring   - pt is clinically improving after thrombectomy   - troponin WNL   - monitor for fluid overload       #  PUD Gastric ulcer at pylorus/ Gastritis   - pt has recent EGD , report is in the paper chart  - biopsy was negative for H.Pylori / dysplasia    - Protonix 40 mg BID    - Carafate 1 gm Q 12 hour   - avoid NSAIDS   - No evidence of active bleed  - pt was consulted by GI, recommendations noted     # Iron deficiency anemia  - received IV Venofer while in the hospital   - no active bleeding noted   - monitor H/H, keep Hb above 7.5 and active type/cross        # H/o hypertension  - DASH diet   -on  Lisinopril     # Inability to walk/ spinal stenosis/ degenerative Dz in L-spine/ Left hip pain   - MRI findings noted , case d/w ortho attending  - s/p  arthrocentesis by IR   - pain control  - PT/rehab evaluation   - fall precautions   - OOB to chair     #Progress Note Handoff  Pending (specify): f/u RUQ US, change Tylenol to suppositories   Family discussion: I spoke with pt, she agreed with a plan of care   Disposition: home ( today after US)

## 2020-09-08 NOTE — DISCHARGE NOTE PROVIDER - HOSPITAL COURSE
Patient is a 63 y/o woman with PMH of HTN, PUD, and hypothyroidism who presented with shortness of breath. Patient found to have bilateral PE on CT angio of the chest. IR was consulted and patient underwent successful thrombectomy. Patient currently on Eliquis 5 BID for anticoagulation. Patient currently saturating well on room air. Hospital course complicated by finding of left femoral head necrosis on MRI. Arthrocentesis performed of the femoral head was negative for infectious etiology. Patient spiking fevers throughout hospital course. Patient placed on Rocephin with persistent fevers. Infectious disease consulted and determined etiology was likely non-infectious; blood cultures and urine cultures both negative. Patient with PPD placement and quantiferon test which will be followed outpatient. Medically stable and ready for discharge. Patient is a 65 y/o woman with PMH of HTN, PUD, and hypothyroidism who presented with shortness of breath. Patient found to have bilateral PE on CT angio of the chest. IR was consulted and patient underwent successful thrombectomy. Patient currently on Eliquis 5 BID for anticoagulation. Patient currently saturating well on room air. Hospital course complicated by finding of left femoral head necrosis on MRI. Arthrocentesis performed of the femoral head was negative for infectious etiology. Patient spiking fevers throughout hospital course. Patient placed on Rocephin with persistent fevers. Patient developed transaminitis and imaging showed a liver abscess. IR was re-consulted and placed a drain and ID recommended IV ceftriaxone and flagyl. Medically stable and ready for discharge.   Patient is a 63 y/o woman with PMH of HTN, PUD, and hypothyroidism who presented with shortness of breath. Patient found to have bilateral PE on CT angio of the chest. IR was consulted and patient underwent successful thrombectomy. Patient currently on Eliquis 5 BID for anticoagulation. Patient currently saturating well on room air. Hospital course complicated by finding of left femoral head necrosis on MRI. Arthrocentesis performed of the femoral head was negative for infectious etiology. Patient spiking fevers throughout hospital course. Patient placed on Rocephin with persistent fevers. Patient developed transaminitis and imaging showed a liver abscess. IR was re-consulted and placed a drain and ID recommended IV ceftriaxone and flagyl. Medically stable and ready for discharge. Will follow up outpatient to remove drain and continue oral antibiotics for 6 weeks.

## 2020-09-08 NOTE — DISCHARGE NOTE PROVIDER - PROVIDER TOKENS
PROVIDER:[TOKEN:[[id number]],FOLLOWUP:[1 week]] PROVIDER:[TOKEN:[[id number]],FOLLOWUP:[1 week]],FREE:[LAST:[elsie],FIRST:[brianne],PHONE:[phone]:[phone]],ADDRESS:[[address] [place] [address]],SCHEDULEDAPPT:[10/05/2020]]

## 2020-09-08 NOTE — CHART NOTE - NSCHARTNOTEFT_GEN_A_CORE
Patient was supposed to be discharged, however had recurring transaminitis AST-137, ALT-107. Ultrasound abdomen ordered. Attending radiologist called with read suspicious for abscess in left liver. Patient to stay for CT abdomen w and w/out contrast. Patient made aware.

## 2020-09-08 NOTE — DISCHARGE NOTE PROVIDER - NSDCCPCAREPLAN_GEN_ALL_CORE_FT
PRINCIPAL DISCHARGE DIAGNOSIS  Diagnosis: Pulmonary embolism  Assessment and Plan of Treatment: You came in with shortness of breath. We did imaging tests and it was found out that the cause of this shortness of breath was a clot in the lung arteries; this is known as pulmonary embolism. We treated you with a procedure and your breathing improved. We also prescribed Eliquis, a medication which prevents clots from getting bigger. Please continue to take this as prescribed. Please visit the emergency room immediately if you feel any chest pain and shortness of breath out of the ordinary.      SECONDARY DISCHARGE DIAGNOSES  Diagnosis: Peptic ulcer  Assessment and Plan of Treatment: You have an ulcer; please avoid taking NSAIDs (drugs such as ibuprofen). Please take carafate and pantoprazole as prescribed. Please follow with a stomach doctor outpatient.    Diagnosis: Aseptic necrosis of femoral head  Assessment and Plan of Treatment: The head of your femur, the bone in your leg connecting to your hip, was found to have some cellular death. Please follow outpatient with a doctor for treatment and rehabilation. Please take fall precautions at your place of residence.    Diagnosis: Fever  Assessment and Plan of Treatment: You had high fevers throughout your stay here. We treated you with antibiotics to treat any potential infection, but the fever continued. We believe this fever is due to your pulmonary embolism. We placed a PPD and ordered a quantiferon gold test; these test for tuberculosis. Please follow with a doctor for the results of this testing. PRINCIPAL DISCHARGE DIAGNOSIS  Diagnosis: Pulmonary embolism  Assessment and Plan of Treatment: You came in with shortness of breath. We did imaging tests and it was found out that the cause of this shortness of breath was a clot in the lung arteries; this is known as pulmonary embolism. We treated you with a procedure and your breathing improved. We also prescribed Eliquis, a medication which prevents clots from getting bigger. Please continue to take this as prescribed. Please visit the emergency room immediately if you feel any chest pain and shortness of breath out of the ordinary.      SECONDARY DISCHARGE DIAGNOSES  Diagnosis: Liver abscess  Assessment and Plan of Treatment: You developed an infection in your liver and were started on antibiotics in the hospital. We also performed a procedure where we placed a tube into your liver to drain the infected fluid out of your body. You had a bag attached to a tube that will fill up with fluid. It is important that you drain this bag everyday.    Diagnosis: Peptic ulcer  Assessment and Plan of Treatment: You have an ulcer; please avoid taking NSAIDs (drugs such as ibuprofen). Please take carafate and pantoprazole as prescribed. Please follow with a stomach doctor outpatient.    Diagnosis: Aseptic necrosis of femoral head  Assessment and Plan of Treatment: The head of your femur, the bone in your leg connecting to your hip, was found to have some cellular death. Please follow outpatient with a doctor for treatment and rehabilation. Please take fall precautions at your place of residence.    Diagnosis: Fever  Assessment and Plan of Treatment: You had high fevers throughout your stay here. We treated you with antibiotics to treat any potential infection, but the fever continued. We believe this fever is due to your pulmonary embolism. We placed a PPD and ordered a quantiferon gold test; these test for tuberculosis. Please follow with a doctor for the results of this testing.     PRINCIPAL DISCHARGE DIAGNOSIS  Diagnosis: Pulmonary embolism  Assessment and Plan of Treatment: You came in with shortness of breath. We did imaging tests and it was found out that the cause of this shortness of breath was a clot in the lung arteries; this is known as pulmonary embolism. We treated you with a procedure and your breathing improved. We also prescribed Eliquis, a medication which prevents clots from getting bigger. Please continue to take this as prescribed. Please visit the emergency room immediately if you feel any chest pain and shortness of breath out of the ordinary.      SECONDARY DISCHARGE DIAGNOSES  Diagnosis: Liver abscess  Assessment and Plan of Treatment: You developed an infection in your liver and were started on antibiotics in the hospital. We also performed a procedure where we placed a tube into your liver to drain the infected fluid out of your body. You had a bag attached to a tube that will fill up with fluid. It is important that you drain this bag everyday. Please flush the drain with 5 cc saline everyday. Follow up outpatient with interventional radiology in 3-4 weeks to remove the drain.    Diagnosis: Peptic ulcer  Assessment and Plan of Treatment: You have an ulcer; please avoid taking NSAIDs (drugs such as ibuprofen). Please take carafate and pantoprazole as prescribed. Please follow with a stomach doctor outpatient.    Diagnosis: Aseptic necrosis of femoral head  Assessment and Plan of Treatment: The head of your femur, the bone in your leg connecting to your hip, was found to have some cellular death. Please follow outpatient with a doctor for treatment and rehabilation. Please take fall precautions at your place of residence.    Diagnosis: Fever  Assessment and Plan of Treatment: You had high fevers throughout your stay here. We treated you with antibiotics to treat any potential infection, but the fever continued. We believe this fever is due to your pulmonary embolism. We placed a PPD and ordered a quantiferon gold test; these test for tuberculosis. Please follow with a doctor for the results of this testing.

## 2020-09-08 NOTE — DISCHARGE NOTE PROVIDER - CARE PROVIDER_API CALL
Monica Teague  INTERNAL MEDICINE  [address] [place] [address]  Phone: [phone]  Fax: [phone]  Follow Up Time: 1 week Monica Teague  INTERNAL MEDICINE  [address] [place] [address]  Phone: [phone]  Fax: [phone]  Follow Up Time: 1 week    brianne zimmerman  [address] [place] [address]  Phone: [phone]  Fax: [phone]  Scheduled Appointment: 10/05/2020

## 2020-09-09 LAB
ALBUMIN SERPL ELPH-MCNC: 2.6 G/DL — LOW (ref 3.5–5.2)
ALP SERPL-CCNC: 173 U/L — HIGH (ref 30–115)
ALT FLD-CCNC: 106 U/L — HIGH (ref 0–41)
ANION GAP SERPL CALC-SCNC: 12 MMOL/L — SIGNIFICANT CHANGE UP (ref 7–14)
AST SERPL-CCNC: 105 U/L — HIGH (ref 0–41)
BASOPHILS # BLD AUTO: 0.03 K/UL — SIGNIFICANT CHANGE UP (ref 0–0.2)
BASOPHILS NFR BLD AUTO: 0.3 % — SIGNIFICANT CHANGE UP (ref 0–1)
BILIRUB SERPL-MCNC: 0.4 MG/DL — SIGNIFICANT CHANGE UP (ref 0.2–1.2)
BUN SERPL-MCNC: 10 MG/DL — SIGNIFICANT CHANGE UP (ref 10–20)
CALCIUM SERPL-MCNC: 8.3 MG/DL — LOW (ref 8.5–10.1)
CHLORIDE SERPL-SCNC: 98 MMOL/L — SIGNIFICANT CHANGE UP (ref 98–110)
CO2 SERPL-SCNC: 22 MMOL/L — SIGNIFICANT CHANGE UP (ref 17–32)
CREAT SERPL-MCNC: 0.7 MG/DL — SIGNIFICANT CHANGE UP (ref 0.7–1.5)
CULTURE RESULTS: SIGNIFICANT CHANGE UP
EOSINOPHIL # BLD AUTO: 0.14 K/UL — SIGNIFICANT CHANGE UP (ref 0–0.7)
EOSINOPHIL NFR BLD AUTO: 1.6 % — SIGNIFICANT CHANGE UP (ref 0–8)
GAMMA INTERFERON BACKGROUND BLD IA-ACNC: 0.04 IU/ML — SIGNIFICANT CHANGE UP
GLUCOSE SERPL-MCNC: 95 MG/DL — SIGNIFICANT CHANGE UP (ref 70–99)
HAV IGM SER-ACNC: SIGNIFICANT CHANGE UP
HBV CORE IGM SER-ACNC: SIGNIFICANT CHANGE UP
HBV SURFACE AG SER-ACNC: SIGNIFICANT CHANGE UP
HCT VFR BLD CALC: 25.5 % — LOW (ref 37–47)
HCV AB S/CO SERPL IA: 0.09 S/CO — SIGNIFICANT CHANGE UP (ref 0–0.99)
HCV AB SERPL-IMP: SIGNIFICANT CHANGE UP
HGB BLD-MCNC: 8 G/DL — LOW (ref 12–16)
IMM GRANULOCYTES NFR BLD AUTO: 1.2 % — HIGH (ref 0.1–0.3)
LYMPHOCYTES # BLD AUTO: 1.53 K/UL — SIGNIFICANT CHANGE UP (ref 1.2–3.4)
LYMPHOCYTES # BLD AUTO: 17.6 % — LOW (ref 20.5–51.1)
M TB IFN-G BLD-IMP: NEGATIVE — SIGNIFICANT CHANGE UP
M TB IFN-G CD4+ BCKGRND COR BLD-ACNC: 0.02 IU/ML — SIGNIFICANT CHANGE UP
M TB IFN-G CD4+CD8+ BCKGRND COR BLD-ACNC: 0.06 IU/ML — SIGNIFICANT CHANGE UP
MAGNESIUM SERPL-MCNC: 1.8 MG/DL — SIGNIFICANT CHANGE UP (ref 1.8–2.4)
MCHC RBC-ENTMCNC: 29.2 PG — SIGNIFICANT CHANGE UP (ref 27–31)
MCHC RBC-ENTMCNC: 31.4 G/DL — LOW (ref 32–37)
MCV RBC AUTO: 93.1 FL — SIGNIFICANT CHANGE UP (ref 81–99)
MONOCYTES # BLD AUTO: 0.77 K/UL — HIGH (ref 0.1–0.6)
MONOCYTES NFR BLD AUTO: 8.9 % — SIGNIFICANT CHANGE UP (ref 1.7–9.3)
NEUTROPHILS # BLD AUTO: 6.12 K/UL — SIGNIFICANT CHANGE UP (ref 1.4–6.5)
NEUTROPHILS NFR BLD AUTO: 70.4 % — SIGNIFICANT CHANGE UP (ref 42.2–75.2)
NRBC # BLD: 0 /100 WBCS — SIGNIFICANT CHANGE UP (ref 0–0)
PLATELET # BLD AUTO: 393 K/UL — SIGNIFICANT CHANGE UP (ref 130–400)
POTASSIUM SERPL-MCNC: 4.4 MMOL/L — SIGNIFICANT CHANGE UP (ref 3.5–5)
POTASSIUM SERPL-SCNC: 4.4 MMOL/L — SIGNIFICANT CHANGE UP (ref 3.5–5)
PROCALCITONIN SERPL-MCNC: 0.23 NG/ML — HIGH (ref 0.02–0.1)
PROT SERPL-MCNC: 5.7 G/DL — LOW (ref 6–8)
QUANT TB PLUS MITOGEN MINUS NIL: 1.92 IU/ML — SIGNIFICANT CHANGE UP
RBC # BLD: 2.74 M/UL — LOW (ref 4.2–5.4)
RBC # FLD: 13.4 % — SIGNIFICANT CHANGE UP (ref 11.5–14.5)
SARS-COV-2 RNA SPEC QL NAA+PROBE: SIGNIFICANT CHANGE UP
SODIUM SERPL-SCNC: 132 MMOL/L — LOW (ref 135–146)
SPECIMEN SOURCE: SIGNIFICANT CHANGE UP
WBC # BLD: 8.69 K/UL — SIGNIFICANT CHANGE UP (ref 4.8–10.8)
WBC # FLD AUTO: 8.69 K/UL — SIGNIFICANT CHANGE UP (ref 4.8–10.8)

## 2020-09-09 PROCEDURE — 99233 SBSQ HOSP IP/OBS HIGH 50: CPT

## 2020-09-09 RX ORDER — CEFTRIAXONE 500 MG/1
2000 INJECTION, POWDER, FOR SOLUTION INTRAMUSCULAR; INTRAVENOUS EVERY 24 HOURS
Refills: 0 | Status: COMPLETED | OUTPATIENT
Start: 2020-09-09 | End: 2020-09-15

## 2020-09-09 RX ORDER — HEPARIN SODIUM 5000 [USP'U]/ML
1700 INJECTION INTRAVENOUS; SUBCUTANEOUS
Qty: 25000 | Refills: 0 | Status: DISCONTINUED | OUTPATIENT
Start: 2020-09-09 | End: 2020-09-09

## 2020-09-09 RX ORDER — MAGNESIUM OXIDE 400 MG ORAL TABLET 241.3 MG
400 TABLET ORAL
Refills: 0 | Status: COMPLETED | OUTPATIENT
Start: 2020-09-09 | End: 2020-09-09

## 2020-09-09 RX ORDER — METRONIDAZOLE 500 MG
500 TABLET ORAL EVERY 8 HOURS
Refills: 0 | Status: DISCONTINUED | OUTPATIENT
Start: 2020-09-09 | End: 2020-09-17

## 2020-09-09 RX ORDER — HEPARIN SODIUM 5000 [USP'U]/ML
1700 INJECTION INTRAVENOUS; SUBCUTANEOUS
Qty: 25000 | Refills: 0 | Status: DISCONTINUED | OUTPATIENT
Start: 2020-09-09 | End: 2020-09-11

## 2020-09-09 RX ADMIN — MAGNESIUM OXIDE 400 MG ORAL TABLET 400 MILLIGRAM(S): 241.3 TABLET ORAL at 17:08

## 2020-09-09 RX ADMIN — Medication 100 MILLIGRAM(S): at 22:55

## 2020-09-09 RX ADMIN — MAGNESIUM OXIDE 400 MG ORAL TABLET 400 MILLIGRAM(S): 241.3 TABLET ORAL at 11:21

## 2020-09-09 RX ADMIN — Medication 100 MILLIGRAM(S): at 13:29

## 2020-09-09 RX ADMIN — PANTOPRAZOLE SODIUM 40 MILLIGRAM(S): 20 TABLET, DELAYED RELEASE ORAL at 05:38

## 2020-09-09 RX ADMIN — LISINOPRIL 5 MILLIGRAM(S): 2.5 TABLET ORAL at 05:38

## 2020-09-09 RX ADMIN — PANTOPRAZOLE SODIUM 40 MILLIGRAM(S): 20 TABLET, DELAYED RELEASE ORAL at 17:08

## 2020-09-09 RX ADMIN — OXYCODONE AND ACETAMINOPHEN 1 TABLET(S): 5; 325 TABLET ORAL at 16:01

## 2020-09-09 RX ADMIN — MAGNESIUM OXIDE 400 MG ORAL TABLET 400 MILLIGRAM(S): 241.3 TABLET ORAL at 11:20

## 2020-09-09 RX ADMIN — Medication 1 GRAM(S): at 05:38

## 2020-09-09 RX ADMIN — HEPARIN SODIUM 17 UNIT(S)/HR: 5000 INJECTION INTRAVENOUS; SUBCUTANEOUS at 15:40

## 2020-09-09 RX ADMIN — Medication 1 GRAM(S): at 17:08

## 2020-09-09 RX ADMIN — APIXABAN 5 MILLIGRAM(S): 2.5 TABLET, FILM COATED ORAL at 05:38

## 2020-09-09 RX ADMIN — CHLORHEXIDINE GLUCONATE 1 APPLICATION(S): 213 SOLUTION TOPICAL at 05:38

## 2020-09-09 RX ADMIN — TUBERCULIN PURIFIED PROTEIN DERIVATIVE 5 UNIT(S): 5 INJECTION, SOLUTION INTRADERMAL at 11:17

## 2020-09-09 RX ADMIN — MAGNESIUM OXIDE 400 MG ORAL TABLET 400 MILLIGRAM(S): 241.3 TABLET ORAL at 09:00

## 2020-09-09 RX ADMIN — CEFTRIAXONE 100 MILLIGRAM(S): 500 INJECTION, POWDER, FOR SOLUTION INTRAMUSCULAR; INTRAVENOUS at 11:20

## 2020-09-09 RX ADMIN — Medication 50 MICROGRAM(S): at 05:38

## 2020-09-09 NOTE — PROGRESS NOTE ADULT - ASSESSMENT
ASSESSMENT AND PLAN:    The patient is a 64 year-old female with a PMH of HTN and hypothyroidism and a PSH of appendectomy and tonsillectomy presenting for shortness of breath, admitted for pulmonary embolism.    # Liver abscess  on CT  - restarted on rocephin and flagyl   - discussed with Id Dr marshall  - IR for drainage on Friday 9/11 due to eliquis this am  - unclear etiology of liver abscess     # transaminitis   - possibly combined due to liver abscess and medication induced - cont to monitor      # Acute respiratory failure due to  submassive acute pulmonary embolism  - resolved   - s/p thrombectomy by  IR on 8/31  - pt is off oxygen and comfortable at rest now on RA  - hold eliquis for IR procedure - start IV heparin at 1700 today and check PTT q6 goal 50-80  - monitor pulse Ox   -  Lidoderm patch for pleuritic chest pain     # Acute diastolic CHF/ PHTN  - resolved   - c/w  Fluid restriction 1500 ml in 24 hours, daily weight  - low sodium diet, intake and output monitoring   - pt is clinically improving after thrombectomy   - troponin WNL   - monitor for fluid overload       #  PUD Gastric ulcer at pylorus/ Gastritis   - pt has recent EGD , report is in the paper chart  - biopsy was negative for H.Pylori / dysplasia    - Protonix 40 mg BID    - Carafate 1 gm Q 12 hour   - avoid NSAIDS   - No evidence of active bleed  - pt was consulted by GI, recommendations noted     # Iron deficiency anemia  - received IV Venofer while in the hospital   - no active bleeding noted   - monitor H/H, keep Hb above 7.5 and active type/cross        # H/o hypertension  - DASH diet   -on  Lisinopril     # Inability to walk/ spinal stenosis/ degenerative Dz in L-spine/ Left hip pain   - MRI findings noted , case d/w ortho attending  - s/p  arthrocentesis by IR   - pain control  - PT/rehab evaluation   - fall precautions   - OOB to chair     #Progress Note Handoff  IR for liver abscess drainage on 9/11  PTT for IV heparin   [place] translation provided by KATYA Hardy

## 2020-09-09 NOTE — PROGRESS NOTE ADULT - SUBJECTIVE AND OBJECTIVE BOX
64 year-old female with a PMH of PUD, HTN and hypothyroidism and a PSH of appendectomy and tonsillectomy presenting for shortness of breath, weakness and dizziness. The patient was found to have submassive pulmonary embolism and underwent thrombectomy on 8/31/20 with IR. The patient was anticoagulated with Heparin ggt. She was followed by GI for PUD.  Pt was downgraded to telemetry from ICU on 9/1/20. While in the hospital pt was diagnosed with iron deficiency amenia.   MRI of the left hip showed significant joint effusion, case discussed with ortho attending, IR consulted , pt underwent arthrocentesis. .  While in the hospital pt is spiking fever, and LFts elevated - found to have liver abscess on CT abdomen     PAST MEDICAL & SURGICAL HISTORY  HTN (hypertension)  Gastric ulcer  S/P tonsillectomy  S/P appendectomy      ALLERGIES:  No Known Allergies    Vital Signs Last 24 Hrs  Vital Signs Last 24 Hrs  T(C): 37.7 (09 Sep 2020 12:25), Max: 37.7 (09 Sep 2020 12:25)  T(F): 99.8 (09 Sep 2020 12:25), Max: 99.8 (09 Sep 2020 12:25)  HR: 95 (09 Sep 2020 12:25) (90 - 99)  BP: 119/64 (09 Sep 2020 12:25) (118/67 - 136/65)  BP(mean): --  RR: 19 (09 Sep 2020 12:25) (18 - 19)  SpO2: 95% (09 Sep 2020 08:14) (95% - 95%)    PHYSICAL EXAM:  GEN: No acute distress  HEENT: normocephalic, atraumatic  LUNGS: decreased breath sounds at bases   HEART: S1/S2 present. RRR, no murmurs  ABD: Soft, non-tender, non-distended. Bowel sounds present  EXT: NC/NC/NE/2+PP/FRANCES, decreased ROM in LLE due to pain   NEURO: AAOX3, normal affect, no focal neuro deficit       LABS:                            8.0    8.69  )-----------( 393      ( 09 Sep 2020 06:18 )             25.5     09-09    132<L>  |  98  |  10  ----------------------------<  95  4.4   |  22  |  0.7    Ca    8.3<L>      09 Sep 2020 06:18  Mg     1.8     09-09    TPro  5.7<L>  /  Alb  2.6<L>  /  TBili  0.4  /  DBili  x   /  AST  105<H>  /  ALT  106<H>  /  AlkPhos  173<H>  09-09    Culture - Body Fluid with Gram Stain (09.04.20 @ 10:30)    Gram Stain:   No polymorphonuclear cells seen  No organisms seen    Specimen Source: .Body Fluid Joint Fluid, Left Hip    Culture - Blood (09.03.20 @ 20:52)    Specimen Source: .Blood None    Culture Results:   No growth to date.    Culture - Urine (09.03.20 @ 01:30)    Specimen Source: .Urine Clean Catch (Midstream)    Culture Results:   >100,000 CFU/ml Escherichia coli      RADIOLOGY:  < from: [facility] Duplex Lower Ext Vein Scan, Bilat (08.31.20 @ 17:39) >  Impression:    No evidence of deep venous thrombosis or superficial thrombophlebitis in the right lower extremity.    Acute left popliteal, posterior tibial peroneal vein DVT    < end of copied text >    < from: IR Procedure (08.31.20 @ 12:48) >  FINDINGS:    1. Preliminary ultrasound demonstrates patent right common femoral vein.    2. Right pulmonary artery angiogram: Extensive filling defects within the right main pulmonary artery extending into the upper and lower lobes.    3. Left pulmonary artery angiogram: Small filling defects within the left lower lobe pulmonary arterial segmental branches with oligemia.    4. Successful mechanical suction thrombectomy in right main pulmonary artery as well as left lower lobe segmental pulmonary artery utilizing Inari suction thrombectomy catheter.    < end of copied text >  < from: Transthoracic Echocardiogram (08.31.20 @ 13:18) >  Summary:   1. Normal global left ventricular systolic function.   2. LV Ejection Fraction by Hardin's Method with a biplane EF of 59 %.   3. Mild left ventricular hypertrophy.   4. Mildly increased LV wall thickness.   5. Spectral Doppler shows impaired relaxation pattern of left ventricular myocardial filling (Grade I diastolic dysfunction).   6. Normal left atrial size.   7. Normal right atrial size.   8. Structurally normal mitral valve, with normal leaflet excursion.   9. Trace mitral valve regurgitation.  10. Mild-moderate tricuspid regurgitation.  11. Sclerotic aortic valve with normal opening.  12. Mild pulmonic valve regurgitation.  13. Estimated pulmonary artery systolic pressure is 47.5 mmHg assuming a right atrial pressure of 5 mmHg, which is consistent with mild pulmonary hypertension.  14. LA volume Index is 14.6 ml/m² ml/m2.    < from: MR Hip w/wo IV Cont, Left (09.02.20 @ 19:42) >  IMPRESSION:    1. 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 Associated stress related sclerosis/edema in the left femoral neck and left acetabulum. Extensive surrounding edema/inflammation. Given these findings, septic arthritis cannot be excluded.  2. Large heterogeneous joint effusion may be due to internal hemorrhage.  3. Apparent tendon tears as above.    < end of copied text >    S/p image guided left hip aspiration, obtaining approx 5cc thick purulent fluid using a 5Fr Roundarcheh needle. Specimen given to primary team resident.    < from: CT Abdomen and Pelvis w/ IV Cont (09.08.20 @ 22:42) >  IMPRESSION:    1.  6.1 x 4.9 x 6.0 cm multiloculated hypoattenuating structure in the left hepatic lobe, compatible with an abscess in the appropriate clinical setting. Mild associated left intrahepatic biliary ductal dilatation. Recommend imaging follow up after treatment to assess for resolution.  2.  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      < end of copied text >    MEDICATIONS  (STANDING):  cefTRIAXone   IVPB 2000 milliGRAM(s) IV Intermittent every 24 hours  chlorhexidine 4% Liquid 1 Application(s) Topical <User Schedule>  levothyroxine 50 MICROGram(s) Oral daily  lisinopril 5 milliGRAM(s) Oral daily  magnesium oxide 400 milliGRAM(s) Oral three times a day with meals  metroNIDAZOLE  IVPB 500 milliGRAM(s) IV Intermittent every 8 hours  pantoprazole   Suspension 40 milliGRAM(s) Oral two times a day  polyethylene glycol 3350 17 Gram(s) Oral daily  sodium chloride 0.9%. 1000 milliLiter(s) (75 mL/Hr) IV Continuous <Continuous>  sucralfate 1 Gram(s) Oral every 12 hours    MEDICATIONS  (PRN):  acetaminophen  Suppository .. 650 milliGRAM(s) Rectal every 6 hours PRN Temp greater or equal to 38C (100.4F)  bisacodyl 5 milliGRAM(s) Oral every 12 hours PRN Constipation  lactulose Syrup 10 Gram(s) Oral two times a day PRN constipation  lidocaine   Patch 1 Patch Transdermal daily PRN chest pain

## 2020-09-09 NOTE — PROGRESS NOTE ADULT - ASSESSMENT
ASSESSMENT  64yFemale with a PMH of hypertension, peptic ulcer disease, hypothyroidism admitted to hospital for submassive PE s/p thrombectomy of 4 clots. Patient complaining of left hip pain, underwent MRI revealing extensive necrosis of femoral head and acetabulum and joint effusion, now s/p image guided arthrocentesis by IR revealing purulent fluid high in RBCs w moderate WBCs. Patient started on ceftriaxone 2000mg IV q24hr. WBC trending down since admission    IMPRESSION  # Possible liver abscess< from: CT Abdomen and Pelvis w/ IV Cont (09.08.20 @ 22:42) >  6.1 x 4.9 x 6.0 cm multiloculated hypoattenuating structure in the left hepatic lobe, compatible with an abscess in the appropriate clinical setting.   #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 with surrounding edema/inflammation with extensive joint effusion  # Low suspicion for septic arthritis s/p image guided arthrocentesis by IR revealing 5cc "purulent" fluid with high level of RBCs and moderate WBCs  - s/p IR guided drainage: 235630 RBCs and 3351 nucleated cells (83% neutrophils)  - Blood Cx 9/3 NG, 9/5 BCX NGTD   - Hip Cx 9/4 NGTD  - Rule out TB arthritis as chronic appearing  #PE     RECOMMENDATIONS  - Consult IR for drainage: send for G/S & culture, AFB  - Ceftriaxone 2g q24h IV and flagyl 500mg q8h IV  - PPD NEGATIVE    Please call with any questions or send a message on [employer] Teams  Spectra [phone]

## 2020-09-09 NOTE — CONSULT NOTE ADULT - SUBJECTIVE AND OBJECTIVE BOX
INTERVENTIONAL RADIOLOGY CONSULT:     Procedure Requested: liver abscess drainage    HPI:  The patient is a 64 year-old female with a PMH of HTN and hypothyroidism and a PSH of appendectomy and tonsillectomy presenting for shortness of breath. The patient reports that on Thursday she began experiencing shortness of breath on minimal ambulation associated w/ weakness and dizziness; she denied associated chest pain or pressure; as the shortness of breath worsened she presented to the ED today.    Of note, about 1 week ago she started having pain in the left hip; had an MRI done which showed spinal stenosis at L3-L4 and L4-L5; was given a "steroid shot" which did not help the pain. Around the same time the patient began complaining of nausea; started to have non-bloody non-bilious vomiting; had an EGD in [place] (records faxed to us, in chart) which revealed thickening of the gastric pylorus with a deep ulcer suspicious for perforation but with no free air; she was started on a PPI BID which she reports alleviated the nausea and vomiting.     In the ED, T 97.6 F, , /94, RR 19; O2 sat 94%. CT angio chest revealed bilateral central pulmonary embolism extending into subsegmental branches w/ evidence of right heart strain. She was started on a heparin gtt; IR was c/s and are planning to do a thrombectomy tomorrow in the AM. The patient reports no recent history of trauma or any history of blood clotting disorders either in herself or in her family. pert called started on heparin and protonix drip hb stable (30 Aug 2020 22:24)      PAST MEDICAL & SURGICAL HISTORY:  HTN (hypertension)  Gastric ulcer  S/P tonsillectomy  S/P appendectomy      MEDICATIONS  (STANDING):  cefTRIAXone   IVPB 2000 milliGRAM(s) IV Intermittent every 24 hours  chlorhexidine 4% Liquid 1 Application(s) Topical <User Schedule>  levothyroxine 50 MICROGram(s) Oral daily  lisinopril 5 milliGRAM(s) Oral daily  magnesium oxide 400 milliGRAM(s) Oral three times a day with meals  metroNIDAZOLE  IVPB 500 milliGRAM(s) IV Intermittent every 8 hours  pantoprazole   Suspension 40 milliGRAM(s) Oral two times a day  polyethylene glycol 3350 17 Gram(s) Oral daily  sodium chloride 0.9%. 1000 milliLiter(s) (75 mL/Hr) IV Continuous <Continuous>  sucralfate 1 Gram(s) Oral every 12 hours    MEDICATIONS  (PRN):  acetaminophen  Suppository .. 650 milliGRAM(s) Rectal every 6 hours PRN Temp greater or equal to 38C (100.4F)  bisacodyl 5 milliGRAM(s) Oral every 12 hours PRN Constipation  lactulose Syrup 10 Gram(s) Oral two times a day PRN constipation  lidocaine   Patch 1 Patch Transdermal daily PRN chest pain      Allergies    No Known Allergies    Intolerances    FAMILY HISTORY:  Family history of early CAD: son      Physical Exam:   Vital Signs Last 24 Hrs  T(C): 37.3 (09 Sep 2020 05:14), Max: 37.3 (08 Sep 2020 12:40)  T(F): 99.2 (09 Sep 2020 05:14), Max: 99.2 (08 Sep 2020 12:40)  HR: 97 (09 Sep 2020 05:14) (90 - 99)  BP: 125/69 (09 Sep 2020 05:14) (118/67 - 136/65)  BP(mean): --  RR: 18 (09 Sep 2020 05:14) (18 - 19)  SpO2: 95% (09 Sep 2020 08:14) (95% - 95%)    Labs:                         8.0    8.69  )-----------( 393      ( 09 Sep 2020 06:18 )             25.5     09-09    132<L>  |  98  |  10  ----------------------------<  95  4.4   |  22  |  0.7    Ca    8.3<L>      09 Sep 2020 06:18  Mg     1.8     09-09    TPro  5.7<L>  /  Alb  2.6<L>  /  TBili  0.4  /  DBili  x   /  AST  105<H>  /  ALT  106<H>  /  AlkPhos  173<H>  09-09        Pertinent labs:                      8.0    8.69  )-----------( 393      ( 09 Sep 2020 06:18 )             25.5       09-09    132<L>  |  98  |  10  ----------------------------<  95  4.4   |  22  |  0.7    Ca    8.3<L>      09 Sep 2020 06:18  Mg     1.8     09-09    TPro  5.7<L>  /  Alb  2.6<L>  /  TBili  0.4  /  DBili  x   /  AST  105<H>  /  ALT  106<H>  /  AlkPhos  173<H>  09-09          Radiology & Additional Studies:     Radiology imaging reviewed.       ASSESSMENT/ PLAN:   64yFemale with a PMH of hypertension, peptic ulcer disease, hypothyroidism admitted to hospital for submassive PE s/p IR thrombectomy of 4 clots. Patient complaining of left hip pain, underwent MRI revealing extensive necrosis of femoral head and acetabulum and joint effusion, now s/p image guided arthrocentesis by IR revealing purulent fluid high in RBCs w moderate WBCs. Patient started on ceftriaxone 2000mg IV q24hr. WBC trending down since admission.  - CT shows 6cm liver abscess  - IR consulted for drainage  - Pt on eliquis  - Pt HD stable, currently afebrile, WBC wnl  - hold eliquis x2 days, can switch to heparin drip  - will schedule liver drainage for friday 9/11   - hold heparin 2 hours prior to procedure  - discussed with resident        Risks, benefits, and alternatives to treatment discussed. All questions answered with understanding.    Thank you for the courtesy of this consult, please call [phone] with any further questions. INTERVENTIONAL RADIOLOGY CONSULT:     Procedure Requested: liver abscess drainage    HPI:  The patient is a 64 year-old female with a PMH of HTN and hypothyroidism and a PSH of appendectomy and tonsillectomy presenting for shortness of breath. The patient reports that on Thursday she began experiencing shortness of breath on minimal ambulation associated w/ weakness and dizziness; she denied associated chest pain or pressure; as the shortness of breath worsened she presented to the ED today.    Of note, about 1 week ago she started having pain in the left hip; had an MRI done which showed spinal stenosis at L3-L4 and L4-L5; was given a "steroid shot" which did not help the pain. Around the same time the patient began complaining of nausea; started to have non-bloody non-bilious vomiting; had an EGD in [place] (records faxed to us, in chart) which revealed thickening of the gastric pylorus with a deep ulcer suspicious for perforation but with no free air; she was started on a PPI BID which she reports alleviated the nausea and vomiting.     In the ED, T 97.6 F, , /94, RR 19; O2 sat 94%. CT angio chest revealed bilateral central pulmonary embolism extending into subsegmental branches w/ evidence of right heart strain. She was started on a heparin gtt; IR was c/s and are planning to do a thrombectomy tomorrow in the AM. The patient reports no recent history of trauma or any history of blood clotting disorders either in herself or in her family. pert called started on heparin and protonix drip hb stable (30 Aug 2020 22:24)      PAST MEDICAL & SURGICAL HISTORY:  HTN (hypertension)  Gastric ulcer  S/P tonsillectomy  S/P appendectomy      MEDICATIONS  (STANDING):  cefTRIAXone   IVPB 2000 milliGRAM(s) IV Intermittent every 24 hours  chlorhexidine 4% Liquid 1 Application(s) Topical <User Schedule>  levothyroxine 50 MICROGram(s) Oral daily  lisinopril 5 milliGRAM(s) Oral daily  magnesium oxide 400 milliGRAM(s) Oral three times a day with meals  metroNIDAZOLE  IVPB 500 milliGRAM(s) IV Intermittent every 8 hours  pantoprazole   Suspension 40 milliGRAM(s) Oral two times a day  polyethylene glycol 3350 17 Gram(s) Oral daily  sodium chloride 0.9%. 1000 milliLiter(s) (75 mL/Hr) IV Continuous <Continuous>  sucralfate 1 Gram(s) Oral every 12 hours    MEDICATIONS  (PRN):  acetaminophen  Suppository .. 650 milliGRAM(s) Rectal every 6 hours PRN Temp greater or equal to 38C (100.4F)  bisacodyl 5 milliGRAM(s) Oral every 12 hours PRN Constipation  lactulose Syrup 10 Gram(s) Oral two times a day PRN constipation  lidocaine   Patch 1 Patch Transdermal daily PRN chest pain      Allergies    No Known Allergies    Intolerances    FAMILY HISTORY:  Family history of early CAD: son      Physical Exam:   Vital Signs Last 24 Hrs  T(C): 37.3 (09 Sep 2020 05:14), Max: 37.3 (08 Sep 2020 12:40)  T(F): 99.2 (09 Sep 2020 05:14), Max: 99.2 (08 Sep 2020 12:40)  HR: 97 (09 Sep 2020 05:14) (90 - 99)  BP: 125/69 (09 Sep 2020 05:14) (118/67 - 136/65)  BP(mean): --  RR: 18 (09 Sep 2020 05:14) (18 - 19)  SpO2: 95% (09 Sep 2020 08:14) (95% - 95%)    Labs:                         8.0    8.69  )-----------( 393      ( 09 Sep 2020 06:18 )             25.5     09-09    132<L>  |  98  |  10  ----------------------------<  95  4.4   |  22  |  0.7    Ca    8.3<L>      09 Sep 2020 06:18  Mg     1.8     09-09    TPro  5.7<L>  /  Alb  2.6<L>  /  TBili  0.4  /  DBili  x   /  AST  105<H>  /  ALT  106<H>  /  AlkPhos  173<H>  09-09        Pertinent labs:                      8.0    8.69  )-----------( 393      ( 09 Sep 2020 06:18 )             25.5       09-09    132<L>  |  98  |  10  ----------------------------<  95  4.4   |  22  |  0.7    Ca    8.3<L>      09 Sep 2020 06:18  Mg     1.8     09-09    TPro  5.7<L>  /  Alb  2.6<L>  /  TBili  0.4  /  DBili  x   /  AST  105<H>  /  ALT  106<H>  /  AlkPhos  173<H>  09-09          Radiology & Additional Studies:     Radiology imaging reviewed.       ASSESSMENT/ PLAN:   64yFemale with a PMH of hypertension, peptic ulcer disease, hypothyroidism admitted to hospital for submassive PE s/p IR thrombectomy of 4 clots. Patient complaining of left hip pain, underwent MRI revealing extensive necrosis of femoral head and acetabulum and joint effusion, now s/p image guided arthrocentesis by IR revealing purulent fluid high in RBCs w moderate WBCs. Patient started on ceftriaxone 2000mg IV q24hr. WBC trending down since admission.  - CT shows 6cm liver abscess  - IR consulted for drainage  - Pt on eliquis  - Pt HD stable, currently afebrile, WBC wnl  - hold eliquis x2 days, can switch to heparin drip  - will schedule liver drainage for friday 9/11   - hold heparin 2 hours prior to procedure  - Keep NPO midnight on thursday night  - discussed with resident        Risks, benefits, and alternatives to treatment discussed. All questions answered with understanding.    Thank you for the courtesy of this consult, please call [phone] with any further questions.

## 2020-09-09 NOTE — PROGRESS NOTE ADULT - SUBJECTIVE AND OBJECTIVE BOX
IOANA POTTER 64y Female  MRN#: [id number]       SUBJECTIVE  Patient is a 64y old woman with PMH of HTN, PUD and hypothyroidism coming in for shortness of breath. Found to have bilateral PE on CT angio chest. Patient underwent thrombectomy. Hospital course complicated by necrosis of femoral head found on MRI. Arthrocentesis performed but was negative. Patient with fevers throughout stay but no infectious source as of now.    Patient meant for discharge yesterday however had transaminitis for which ultrasound was offered. Ultrasound performed showed hypoechogenicity in left liver. CT scan with contrast performed showed a 6.1 x 4.9x 6.0 cm multiloculated structure in left hepatic lobe concerning for abscess. ID consulted and stated to initiate on ceftriaxone 2g QD and metronidazole 500 q8hrs. Spoke to IR who stated she can have drainage on Friday. Patient placed on heparin drip in the interim.    Patient examined at bedside. Aware of situation. No new complaints.      OBJECTIVE  PAST MEDICAL & SURGICAL HISTORY  HTN (hypertension)  Gastric ulcer  S/P tonsillectomy  S/P appendectomy    ALLERGIES:  No Known Allergies    MEDICATIONS:  STANDING MEDICATIONS  cefTRIAXone   IVPB 2000 milliGRAM(s) IV Intermittent every 24 hours  chlorhexidine 4% Liquid 1 Application(s) Topical <User Schedule>  heparin  Infusion 1700 Unit(s)/Hr IV Continuous <Continuous>  levothyroxine 50 MICROGram(s) Oral daily  lisinopril 5 milliGRAM(s) Oral daily  magnesium oxide 400 milliGRAM(s) Oral three times a day with meals  metroNIDAZOLE  IVPB 500 milliGRAM(s) IV Intermittent every 8 hours  pantoprazole   Suspension 40 milliGRAM(s) Oral two times a day  polyethylene glycol 3350 17 Gram(s) Oral daily  sodium chloride 0.9%. 1000 milliLiter(s) IV Continuous <Continuous>  sucralfate 1 Gram(s) Oral every 12 hours    PRN MEDICATIONS  acetaminophen  Suppository .. 650 milliGRAM(s) Rectal every 6 hours PRN  bisacodyl 5 milliGRAM(s) Oral every 12 hours PRN  lactulose Syrup 10 Gram(s) Oral two times a day PRN  lidocaine   Patch 1 Patch Transdermal daily PRN      VITAL SIGNS: Last 24 Hours  T(C): 37.7 (09 Sep 2020 12:25), Max: 37.7 (09 Sep 2020 12:25)  T(F): 99.8 (09 Sep 2020 12:25), Max: 99.8 (09 Sep 2020 12:25)  HR: 95 (09 Sep 2020 12:25) (95 - 99)  BP: 119/64 (09 Sep 2020 12:25) (118/67 - 125/69)  BP(mean): --  RR: 19 (09 Sep 2020 12:25) (18 - 19)  SpO2: 95% (09 Sep 2020 08:14) (95% - 95%)    LABS:                        8.0    8.69  )-----------( 393      ( 09 Sep 2020 06:18 )             25.5     09-09    132<L>  |  98  |  10  ----------------------------<  95  4.4   |  22  |  0.7    Ca    8.3<L>      09 Sep 2020 06:18  Mg     1.8     09-09    TPro  5.7<L>  /  Alb  2.6<L>  /  TBili  0.4  /  DBili  x   /  AST  105<H>  /  ALT  106<H>  /  AlkPhos  173<H>  09-09                  RADIOLOGY:    < from: CT Abdomen and Pelvis w/ IV Cont (09.08.20 @ 22:42) >    1.  6.1 x 4.9 x 6.0 cm multiloculated hypoattenuating structure in the left hepatic lobe, compatible with an abscess in the appropriate clinical setting. Mild associated left intrahepatic biliary ductal dilatation. Recommend imaging follow up after treatment to assess for resolution.  2.  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    < end of copied text >    < from: US Abdomen Limited (09.08.20 @ 14:41) >    IMPRESSION:    Ill-defined hypoechoic foci of the left hepatic lobe measuring up to 4 cm.    Hepatic abscess is not excluded and further evaluation with a postcontrast CT is recommended    Dr. Vargas was made aware of the above findings on 9/8/2020 at 4:29 PM with read back    < end of copied text >        PHYSICAL EXAM:    GEN: NAD, Resting comfortably in bed  PULM: Clear to auscultation bilaterally, No wheezes  CVS: RRR, S1-S2, no murmurs  ABD: Soft, non-tender, non-distended, no guarding  EXT: No edema  NEURO: A&Ox3, no focal deficits        ASSESSMENT & PLAN    Patient is a 63 y/o woman with PMH of HTN, PUD, and hypothyroidism presenting for SOB. Found to have PE. Course complicated by necrosis of femoral head; cause unknown. Patient found to have hypoechogenicity on US of liver; suspicious for abscess. CT again revealed a foci suspicious for abscess.    1. Pulmonary embolism  -Had thrombectomy on 8/31  -Currently on room air, saturating well  -Holding Eliquis for IR drainage on Friday  -Started on heparin drip; follow PTT (goal 50-80)  -Lidoderm patch for chest pain    2. Liver abscess  -Transaminitis with fevers (latest fever 9/8-101F)  -Ultrasound findings suspicious for abscess  -CT scan with contrast performed showed a 6.1 x 4.9x 6.0 cm multiloculated structure in left hepatic lobe concerning for abscess  -ID consulted: ceftriaxone 2g Q24, metronidazole 500 Q8  -IR consulted; for drainage Friday 9/11  -Eliquis held; heparin gtt  -NPO Thursday night    3. Femoral head necrosis   -Infectious vs other  -Arthrocentesis results negative  -ID consulted  -PPD placed; negative  -F/U quantiferon   -PT    4. CHF  -Fluid restriction 1.5L QD  -Daily weights  -I+O    5, PUD  -EGD outpatient showing deep ulcer  -Protonix 40 BID  -Carafate 1gm BID  -CBC  -Type and screen     6. Iron Deficiency anemia  -Monitor hemoglobin  -Transfuse if <7.5    7. HTN  -DASH  -Lisinopril    8. Spinal stenosis  -PT/Rehab  -Pain control  -Fall precautions

## 2020-09-09 NOTE — CONSULT NOTE ADULT - PROVIDER SPECIALTY LIST ADULT
Orthopedics
Gastroenterology
Infectious Disease
Intervent Radiology

## 2020-09-09 NOTE — PROGRESS NOTE ADULT - SUBJECTIVE AND OBJECTIVE BOX
IOANA POTTER  64y, Female  Allergy: No Known Allergies      LOS  10d    CHIEF COMPLAINT: pulmonary embolism (08 Sep 2020 14:02)      INTERVAL EVENTS/HPI  - Given rising LFTs , RUQ and then CT performed concerning for liver abscess  - T(F): , Max: 99.2 (09-08-20 @ 12:40)  - Denies any worsening symptoms  - Tolerating medication  - WBC Count: 8.69 (09-09-20 @ 06:18)  WBC Count: 8.77 (09-08-20 @ 06:34)  - Creatinine, Serum: 0.7 (09-09-20 @ 06:18)  Creatinine, Serum: 0.7 (09-08-20 @ 06:34)       ROS  General: Denies rigors, nightsweats  HEENT: Denies headache, rhinorrhea, sore throat, eye pain  CV: Denies CP, palpitations  PULM: Denies wheezing, hemoptysis  GI: Denies hematemesis, hematochezia, melena  : Denies discharge, hematuria  MSK: Denies arthralgias, myalgias  SKIN: Denies rash, lesions  NEURO: Denies paresthesias, weakness  PSYCH: Denies depression, anxiety    VITALS:  T(F): 99.2, Max: 99.2 (09-08-20 @ 12:40)  HR: 97  BP: 125/69  RR: 18Vital Signs Last 24 Hrs  T(C): 37.3 (09 Sep 2020 05:14), Max: 37.3 (08 Sep 2020 12:40)  T(F): 99.2 (09 Sep 2020 05:14), Max: 99.2 (08 Sep 2020 12:40)  HR: 97 (09 Sep 2020 05:14) (90 - 99)  BP: 125/69 (09 Sep 2020 05:14) (118/67 - 136/65)  BP(mean): --  RR: 18 (09 Sep 2020 05:14) (18 - 19)  SpO2: 95% (09 Sep 2020 08:14) (95% - 95%)    PHYSICAL EXAM:  Gen: NAD, resting in bed  HEENT: Normocephalic, atraumatic  Neck: supple, no lymphadenopathy  CV: Regular rate & regular rhythm  Lungs: decreased BS at bases, no fremitus  Abdomen: Soft, BS present  Ext: Warm, well perfused  Neuro: non focal, awake  Skin: no rash, no erythema  Lines: no phlebitis    FH: Non-contributory  Social Hx: Non-contributory    TESTS & MEASUREMENTS:                        8.0    8.69  )-----------( 393      ( 09 Sep 2020 06:18 )             25.5     09-09    132<L>  |  98  |  10  ----------------------------<  95  4.4   |  22  |  0.7    Ca    8.3<L>      09 Sep 2020 06:18  Mg     1.8     09-09    TPro  5.7<L>  /  Alb  2.6<L>  /  TBili  0.4  /  DBili  x   /  AST  105<H>  /  ALT  106<H>  /  AlkPhos  173<H>  09-09    eGFR if Non African American: 90 mL/min/1.73M2 (09-09-20 @ 06:18)  eGFR if African American: 105 mL/min/1.73M2 (09-09-20 @ 06:18)    LIVER FUNCTIONS - ( 09 Sep 2020 06:18 )  Alb: 2.6 g/dL / Pro: 5.7 g/dL / ALK PHOS: 173 U/L / ALT: 106 U/L / AST: 105 U/L / GGT: x               Culture - Blood (collected 09-06-20 @ 06:53)  Source: .Blood None  Preliminary Report (09-07-20 @ 18:01):    No growth to date.    Culture - Blood (collected 09-05-20 @ 11:42)  Source: .Blood None  Preliminary Report (09-06-20 @ 19:01):    No growth to date.    Culture - Body Fluid with Gram Stain (collected 09-04-20 @ 10:30)  Source: .Body Fluid Joint Fluid, Left Hip  Gram Stain (09-04-20 @ 22:29):    No polymorphonuclear cells seen    No organisms seen  Preliminary Report (09-05-20 @ 17:11):    No growth    Culture - Blood (collected 09-03-20 @ 20:52)  Source: .Blood None  Final Report (09-09-20 @ 04:01):    No Growth Final    Culture - Urine (collected 09-03-20 @ 18:00)  Source: .Urine Catheterized  Final Report (09-05-20 @ 20:47):    <10,000 CFU/mL Normal Urogenital Julianne    Culture - Urine (collected 09-03-20 @ 01:30)  Source: .Urine Clean Catch (Midstream)  Final Report (09-05-20 @ 18:36):    >100,000 CFU/ml Escherichia coli  Organism: Escherichia coli (09-05-20 @ 18:36)  Organism: Escherichia coli (09-05-20 @ 18:36)      -  Amikacin: S <=16      -  Amoxicillin/Clavulanic Acid: S <=8/4      -  Ampicillin: R >16 These ampicillin results predict results for amoxicillin      -  Ampicillin/Sulbactam: I 16/8 Enterobacter, Citrobacter, and Serratia may develop resistance during prolonged therapy (3-4 days)      -  Aztreonam: S <=4      -  Cefazolin: S <=2 (MIC_CL_COM_ENTERIC_CEFAZU) For uncomplicated UTI with K. pneumoniae, E. coli, or P. mirablis: HAKAN <=16 is sensitive and HAKAN >=32 is resistant. This also predicts results for oral agents cefaclor, cefdinir, cefpodoxime, cefprozil, cefuroxime axetil, cephalexin and locarbef for uncomplicated UTI. Note that some isolates may be susceptible to these agents while testing resistant to cefazolin.      -  Cefepime: S <=2      -  Cefoxitin: S <=8      -  Ceftriaxone: S <=1 Enterobacter, Citrobacter, and Serratia may develop resistance during prolonged therapy      -  Ciprofloxacin: S <=0.25      -  Ertapenem: S <=0.5      -  Gentamicin: S <=2      -  Imipenem: S <=1      -  Levofloxacin: S <=0.5      -  Meropenem: S <=1      -  Nitrofurantoin: S <=32 Should not be used to treat pyelonephritis      -  Piperacillin/Tazobactam: S <=8      -  Tigecycline: S <=2      -  Tobramycin: S <=2      -  Trimethoprim/Sulfamethoxazole: R >2/38      Method Type: HAKAN            INFECTIOUS DISEASES TESTING  COVID-19 PCR: NotDetec (09-08-20 @ 10:02)  MRSA PCR Result.: Negative (09-05-20 @ 17:00)  COVID-19 PCR: NotDetec (08-30-20 @ 16:49)      INFLAMMATORY MARKERS      RADIOLOGY & ADDITIONAL TESTS:  I have personally reviewed the last available Chest xray  CXR      CT  CT Abdomen and Pelvis w/ IV Cont:   EXAM:  CT ABDOMEN AND PELVIS IC            PROCEDURE DATE:  09/08/2020            INTERPRETATION:  CLINICAL STATEMENT: Transaminitis    TECHNIQUE: Contiguous axial CT images were obtained from the lower chest to the pubic symphysis following administration of 100 cc Omnipaque 350 intravenous contrast.  Oral contrast was not administered.  Reformatted images in the coronal and sagittal planes were acquired.    COMPARISON: US ABDOMEN 9/8/2020.    FINDINGS:    LOWER CHEST: Bibasilar atelectasis.    HEPATOBILIARY: 6.1 x 4.9 x 6.0 cm multiloculated hypoattenuating structure in the left hepatic lobe, compatible with an abscess in the appropriate clinical setting - did not appear to be present on chest CT of 8/30/2020 where the liver was partiallyimaged. Mild left intrahepatic biliary ductal dilatation secondary to mass effect. Contracted gallbladder.    SPLEEN: Unremarkable.    PANCREAS: Unremarkable.    ADRENAL GLANDS: Unremarkable.    KIDNEYS: No hydronephrosis.    ABDOMINOPELVIC NODES: Unremarkable.    PELVIC ORGANS: Vaginal pessary noted.    PERITONEUM/MESENTERY/BOWEL: No evidence of bowel obstruction, free air, or ascites.    BONES/SOFT TISSUES: Extensive destructive changes of the left femoral head as seen on MRI of 9/2/2020 with surrounding inflammatory change in this patient with septic arthritis. Hyperdense material in the left hip joint likely from recent arthrocentesis. Degenerative changes of the spine    OTHER: Circumaortic left renal vein. Atherosclerotic calcificationsof the aorta.    IMPRESSION:    1.  6.1 x 4.9 x 6.0 cm multiloculated hypoattenuating structure in the left hepatic lobe, compatible with an abscess in the appropriate clinical setting. Mild associated left intrahepatic biliary ductal dilatation. Recommend imaging follow up after treatment to assess for resolution.  2.  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              THERON WILEY M.D., RESIDENT RADIOLOGIST  This document has been electronically signed.  MARTINEZ PAGAN M.D., ATTENDING RADIOLOGIST  This document has been electronically signed. Sep  9 2020  9:37AM             (09-08-20 @ 22:42)      CARDIOLOGY TESTING  12 Lead ECG:   Ventricular Rate 91 BPM    Atrial Rate 91 BPM    P-R Interval 128 ms    QRS Duration 72 ms    Q-T Interval 348 ms    QTC Calculation(Bezet) 428 ms    P Axis 40 degrees    R Axis -23 degrees    T Axis 11 degrees    Diagnosis Line Normal sinus rhythm  Possible Left atrial enlargement  Low voltage QRS  Possible Lateral infarct , age undetermined  Abnormal ECG    Confirmed by ALDA KRAMER MD ([id number]) on 9/7/2020 11:16:38 PM (09-05-20 @ 13:42)  Transthoracic Echocardiogram:    EXAM:  2-D ECHO (TTE) COMPLETE        PROCEDURE DATE:  08/31/2020      INTERPRETATION:  REPORT:  TRANSTHORACIC ECHOCARDIOGRAM REPORT        Patient Name:   IOANA POTTER Accession #: [id number]  Medical Rec #:  [id number]         Height:      63.0 in 160.0 cm  YOB: 1955        Weight:      185.0 lb 83.91 kg  Patient Age:    64 years          BSA:         1.87 m²  Patient Gender: F                 BP:          116/66 mmHg      Date of Exam:        8/31/2020 1:18:07 PM  Referring Physician: [id number] IRINA NAILS  Sonographer:         Felicia Griffin  Reading Physician:   Livier Colvin M.D.    Procedure:   2D Echo/Doppler/Color Doppler Complete.  Indications: R07.9 - Chest Pain, unspecified  Diagnosis:   Chest pain, unspecified - R07.9        Summary:   1. Normal global left ventricular systolic function.   2. LV Ejection Fraction by Hardin's Method with a biplane EF of 59 %.   3. Mild left ventricular hypertrophy.   4. Mildly increased LV wall thickness.   5. Spectral Doppler shows impaired relaxation pattern of left ventricular myocardial filling (Grade I diastolic dysfunction).   6. Normal left atrial size.   7. Normal right atrial size.   8. Structurally normal mitral valve, with normal leaflet excursion.   9. Trace mitral valve regurgitation.  10. Mild-moderate tricuspid regurgitation.  11. Sclerotic aortic valve with normal opening.  12. Mild pulmonic valve regurgitation.  13. Estimated pulmonary artery systolic pressure is 47.5 mmHg assuming a right atrial pressure of 5 mmHg, which is consistent with mild pulmonary hypertension.  14. LA volume Index is 14.6 ml/m² ml/m2.    PHYSICIAN INTERPRETATION:  Left Ventricle: The left ventricular internal cavity size is normal. Left ventricular wall thicknessis mildly increased. There is mild left ventricular hypertrophy. Global LV systolic function was normal. Spectral Doppler shows impaired relaxation pattern of left ventricular myocardial filling (Grade I diastolic dysfunction). Normal LV filling pressures.  Right Ventricle: Normal right ventricular size and function.  Left Atrium: Normal left atrial size. LA volume Index is 14.6 ml/m² ml/m2.  Right Atrium: Normal right atrial size.  Pericardium: There is no evidence of pericardial effusion.  Mitral Valve: Structurally normal mitral valve, with normal leaflet excursion. Trace mitral valve regurgitation is seen.  Tricuspid Valve: Structurally normal tricuspid valve, with normal leaflet excursion. Mild-moderate tricuspid regurgitation is visualized. Estimated pulmonary artery systolic pressure is 47.5 mmHg assuming a right atrial pressure of 5 mmHg, which is consistent with mild pulmonary hypertension.  Aortic Valve: The aortic valve is trileaflet. Sclerotic aortic valve with normal opening.No evidence of aortic valve regurgitation is seen.  Pulmonic Valve: Structurally normal pulmonic valve, with normal leaflet excursion. Mild pulmonic valve regurgitation.  Aorta: The aortic root and ascending aorta are structurally normal, with no evidence of dilitation.  Pulmonary Artery: The main pulmonary artery is normal in size.  Venous: The inferior vena cava was normal sized, with respiratory size variation greater than 50%.      2D AND M-MODE MEASUREMENTS (normal ranges within parentheses):  Left                  Normal   Aorta/Left             Normal  Ventricle:                     Atrium:  IVSd (2D):  1.13 cm  (0.7-1.1) AoV Cusp       1.76  (1.5-2.6)  LVPWd (2D): 0.72 cm  (0.7-1.1) Separation:     cm  LVIDd (2D): 3.98 cm  (3.4-5.7) Left Atrium    2.70  (1.9-4.0)  LVIDs (2D): 2.75 cm            (Mmode):        cm  LV FS (2D):  30.9 %   (>25%)   LA Volume      14.5  Relative      0.36    (<0.42)  Index         ml/m²  Wall                           Right  Thickness   Ventricle:                                 RVd (2D):      3.19 cm    SPECTRAL DOPPLER ANALYSIS:  LV DIASTOLIC FUNCTION:  MV Peak E: 0.49 m/s Decel Time: 270 msec  MV Peak A: 0.90 m/s  E/A Ratio: 0.54    LVOT Vmax: 1.31 m/s  LVOT VTI:  0.18 m  LVOTDiam: 1.77 cm    Mitral Valve:  MV P1/2 Time: 78.40 msec  MV Area, PHT: 2.81 cm²    Tricuspid Valve and PA/RV Systolic Pressure: TR Max Velocity: 3.26 m/s RA Pressure: 5 mmHg RVSP/PASP: 47.5 mmHg      [id number] Livier Colvin M.D., Electronically signedon 8/31/2020 at 4:35:15 PM            *** Final ***              LIVIER COLVIN M.D., RESIDENT RADIOLOGIST  This document has been electronically signed.  LIVIER COLVIN M.D., RESIDENT RADIOLOGIST  This document has been electronically signed. Aug [id number]  1:18PM             (08-31-20 @ 13:18)      MEDICATIONS  cefTRIAXone   IVPB 2000 IV Intermittent every 24 hours  chlorhexidine 4% Liquid 1 Topical <User Schedule>  levothyroxine 50 Oral daily  lisinopril 5 Oral daily  magnesium oxide 400 Oral three times a day with meals  metroNIDAZOLE  IVPB 500 IV Intermittent every 8 hours  pantoprazole   Suspension 40 Oral two times a day  polyethylene glycol 3350 17 Oral daily  sodium chloride 0.9%. 1000 IV Continuous <Continuous>  sucralfate 1 Oral every 12 hours      WEIGHT  Weight (kg): 87.3 (09-01-20 @ 15:49)  Creatinine, Serum: 0.7 mg/dL (09-09-20 @ 06:18)      ANTIBIOTICS:  cefTRIAXone   IVPB 2000 milliGRAM(s) IV Intermittent every 24 hours  metroNIDAZOLE  IVPB 500 milliGRAM(s) IV Intermittent every 8 hours      All available historical records have been reviewed

## 2020-09-09 NOTE — CONSULT NOTE ADULT - CONSULT REQUESTED DATE/TIME
03-Sep-2020 13:11
03-Sep-2020 13:33
09-Sep-2020 11:57
31-Aug-2020 16:18
03-Sep-2020 13:38
30-Aug-2020 19:59

## 2020-09-09 NOTE — CONSULT NOTE ADULT - CONSULT REASON
h/o PUD
high grade fever, UTI, r/o septic arthritis
left hip pain< from: MR Hip w/wo IV Cont, Left (09.02.20 @ 19:42) >    IMPRESSION:    1. 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 Associated stress related sclerosis/edema in the left femoral neck and left acetabulum. Extensive surrounding edema/inflammation. Given these findings, septic arthritis cannot be excluded.  2. Large heterogeneous joint effusion may be due to internal hemorrhage.  3. Apparent tendon tears as above.      < end of copied text >
liver abscess drainage
PE
left hip aspiration

## 2020-09-10 LAB
ALBUMIN SERPL ELPH-MCNC: 2.6 G/DL — LOW (ref 3.5–5.2)
ALP SERPL-CCNC: 177 U/L — HIGH (ref 30–115)
ALT FLD-CCNC: 87 U/L — HIGH (ref 0–41)
ANION GAP SERPL CALC-SCNC: 12 MMOL/L — SIGNIFICANT CHANGE UP (ref 7–14)
APTT BLD: 56 SEC — HIGH (ref 27–39.2)
APTT BLD: 69.2 SEC — HIGH (ref 27–39.2)
APTT BLD: 76.8 SEC — CRITICAL HIGH (ref 27–39.2)
AST SERPL-CCNC: 61 U/L — HIGH (ref 0–41)
BASOPHILS # BLD AUTO: 0.02 K/UL — SIGNIFICANT CHANGE UP (ref 0–0.2)
BASOPHILS NFR BLD AUTO: 0.3 % — SIGNIFICANT CHANGE UP (ref 0–1)
BILIRUB SERPL-MCNC: 0.3 MG/DL — SIGNIFICANT CHANGE UP (ref 0.2–1.2)
BLD GP AB SCN SERPL QL: SIGNIFICANT CHANGE UP
BLD GP AB SCN SERPL QL: SIGNIFICANT CHANGE UP
BUN SERPL-MCNC: 14 MG/DL — SIGNIFICANT CHANGE UP (ref 10–20)
CALCIUM SERPL-MCNC: 8.1 MG/DL — LOW (ref 8.5–10.1)
CHLORIDE SERPL-SCNC: 98 MMOL/L — SIGNIFICANT CHANGE UP (ref 98–110)
CO2 SERPL-SCNC: 23 MMOL/L — SIGNIFICANT CHANGE UP (ref 17–32)
CREAT SERPL-MCNC: 0.7 MG/DL — SIGNIFICANT CHANGE UP (ref 0.7–1.5)
CULTURE RESULTS: SIGNIFICANT CHANGE UP
EOSINOPHIL # BLD AUTO: 0.1 K/UL — SIGNIFICANT CHANGE UP (ref 0–0.7)
EOSINOPHIL NFR BLD AUTO: 1.3 % — SIGNIFICANT CHANGE UP (ref 0–8)
GLUCOSE SERPL-MCNC: 123 MG/DL — HIGH (ref 70–99)
HCT VFR BLD CALC: 24.5 % — LOW (ref 37–47)
HCT VFR BLD CALC: 28.3 % — LOW (ref 37–47)
HGB BLD-MCNC: 7.6 G/DL — LOW (ref 12–16)
HGB BLD-MCNC: 8.4 G/DL — LOW (ref 12–16)
IMM GRANULOCYTES NFR BLD AUTO: 1 % — HIGH (ref 0.1–0.3)
LYMPHOCYTES # BLD AUTO: 1.83 K/UL — SIGNIFICANT CHANGE UP (ref 1.2–3.4)
LYMPHOCYTES # BLD AUTO: 23.9 % — SIGNIFICANT CHANGE UP (ref 20.5–51.1)
MAGNESIUM SERPL-MCNC: 1.9 MG/DL — SIGNIFICANT CHANGE UP (ref 1.8–2.4)
MCHC RBC-ENTMCNC: 28.4 PG — SIGNIFICANT CHANGE UP (ref 27–31)
MCHC RBC-ENTMCNC: 28.6 PG — SIGNIFICANT CHANGE UP (ref 27–31)
MCHC RBC-ENTMCNC: 29.7 G/DL — LOW (ref 32–37)
MCHC RBC-ENTMCNC: 31 G/DL — LOW (ref 32–37)
MCV RBC AUTO: 92.1 FL — SIGNIFICANT CHANGE UP (ref 81–99)
MCV RBC AUTO: 95.6 FL — SIGNIFICANT CHANGE UP (ref 81–99)
MONOCYTES # BLD AUTO: 0.5 K/UL — SIGNIFICANT CHANGE UP (ref 0.1–0.6)
MONOCYTES NFR BLD AUTO: 6.5 % — SIGNIFICANT CHANGE UP (ref 1.7–9.3)
NEUTROPHILS # BLD AUTO: 5.13 K/UL — SIGNIFICANT CHANGE UP (ref 1.4–6.5)
NEUTROPHILS NFR BLD AUTO: 67 % — SIGNIFICANT CHANGE UP (ref 42.2–75.2)
NRBC # BLD: 0 /100 WBCS — SIGNIFICANT CHANGE UP (ref 0–0)
NRBC # BLD: 0 /100 WBCS — SIGNIFICANT CHANGE UP (ref 0–0)
PLATELET # BLD AUTO: 186 K/UL — SIGNIFICANT CHANGE UP (ref 130–400)
PLATELET # BLD AUTO: 288 K/UL — SIGNIFICANT CHANGE UP (ref 130–400)
POTASSIUM SERPL-MCNC: 4.1 MMOL/L — SIGNIFICANT CHANGE UP (ref 3.5–5)
POTASSIUM SERPL-SCNC: 4.1 MMOL/L — SIGNIFICANT CHANGE UP (ref 3.5–5)
PROT SERPL-MCNC: 5.7 G/DL — LOW (ref 6–8)
RBC # BLD: 2.66 M/UL — LOW (ref 4.2–5.4)
RBC # BLD: 2.96 M/UL — LOW (ref 4.2–5.4)
RBC # FLD: 13.2 % — SIGNIFICANT CHANGE UP (ref 11.5–14.5)
RBC # FLD: 13.2 % — SIGNIFICANT CHANGE UP (ref 11.5–14.5)
SODIUM SERPL-SCNC: 133 MMOL/L — LOW (ref 135–146)
SPECIMEN SOURCE: SIGNIFICANT CHANGE UP
WBC # BLD: 6.44 K/UL — SIGNIFICANT CHANGE UP (ref 4.8–10.8)
WBC # BLD: 7.66 K/UL — SIGNIFICANT CHANGE UP (ref 4.8–10.8)
WBC # FLD AUTO: 6.44 K/UL — SIGNIFICANT CHANGE UP (ref 4.8–10.8)
WBC # FLD AUTO: 7.66 K/UL — SIGNIFICANT CHANGE UP (ref 4.8–10.8)

## 2020-09-10 PROCEDURE — 99233 SBSQ HOSP IP/OBS HIGH 50: CPT

## 2020-09-10 RX ADMIN — Medication 100 MILLIGRAM(S): at 05:29

## 2020-09-10 RX ADMIN — HEPARIN SODIUM 17 UNIT(S)/HR: 5000 INJECTION INTRAVENOUS; SUBCUTANEOUS at 17:00

## 2020-09-10 RX ADMIN — LISINOPRIL 5 MILLIGRAM(S): 2.5 TABLET ORAL at 05:30

## 2020-09-10 RX ADMIN — Medication 100 MILLIGRAM(S): at 12:58

## 2020-09-10 RX ADMIN — Medication 100 MILLIGRAM(S): at 21:06

## 2020-09-10 RX ADMIN — PANTOPRAZOLE SODIUM 40 MILLIGRAM(S): 20 TABLET, DELAYED RELEASE ORAL at 17:01

## 2020-09-10 RX ADMIN — Medication 50 MICROGRAM(S): at 05:30

## 2020-09-10 RX ADMIN — PANTOPRAZOLE SODIUM 40 MILLIGRAM(S): 20 TABLET, DELAYED RELEASE ORAL at 05:30

## 2020-09-10 RX ADMIN — Medication 650 MILLIGRAM(S): at 05:30

## 2020-09-10 RX ADMIN — CHLORHEXIDINE GLUCONATE 1 APPLICATION(S): 213 SOLUTION TOPICAL at 05:29

## 2020-09-10 RX ADMIN — CEFTRIAXONE 100 MILLIGRAM(S): 500 INJECTION, POWDER, FOR SOLUTION INTRAMUSCULAR; INTRAVENOUS at 11:06

## 2020-09-10 RX ADMIN — HEPARIN SODIUM 17 UNIT(S)/HR: 5000 INJECTION INTRAVENOUS; SUBCUTANEOUS at 05:51

## 2020-09-10 RX ADMIN — Medication 1 GRAM(S): at 17:00

## 2020-09-10 RX ADMIN — POLYETHYLENE GLYCOL 3350 17 GRAM(S): 17 POWDER, FOR SOLUTION ORAL at 11:06

## 2020-09-10 RX ADMIN — Medication 1 GRAM(S): at 05:30

## 2020-09-10 NOTE — PROGRESS NOTE ADULT - ASSESSMENT
ASSESSMENT AND PLAN:    The patient is a 64 year-old female with a PMH of HTN and hypothyroidism and a PSH of appendectomy and tonsillectomy presenting for shortness of breath, admitted for pulmonary embolism.    # Liver abscess  on CT  - restarted on rocephin and flagyl   - discussed with Id Dr marshall  - IR for drainage on Friday 9/11 due to eliquis - npo after mn  - unclear etiology of liver abscess   - loose stool - dc laxatives - if persists then check cdiff    # transaminitis   - possibly combined due to liver abscess and medication induced - cont to monitor      # Acute respiratory failure due to  submassive acute pulmonary embolism  - resolved   - s/p thrombectomy by  IR on 8/31  - pt is off oxygen and comfortable at rest now on RA  - hold eliquis for IR procedure - start IV heparin at 1700 today and check PTT q6 goal 50-80  - monitor pulse Ox   -  Lidoderm patch for pleuritic chest pain     # Acute diastolic CHF/ PHTN  - resolved   - c/w  Fluid restriction 1500 ml in 24 hours, daily weight  - low sodium diet, intake and output monitoring   - pt is clinically improving after thrombectomy   - troponin WNL   - monitor for fluid overload       #  PUD Gastric ulcer at pylorus/ Gastritis   - pt has recent EGD , report is in the paper chart  - biopsy was negative for H.Pylori / dysplasia    - Protonix 40 mg BID    - Carafate 1 gm Q 12 hour   - avoid NSAIDS   - No evidence of active bleed  - pt was consulted by GI, recommendations noted     # Iron deficiency anemia  - received IV Venofer while in the hospital   - no active bleeding noted   - monitor H/H, keep Hb above 7.5 and active type/cross        # H/o hypertension  - DASH diet   -on  Lisinopril     # Inability to walk/ spinal stenosis/ degenerative Dz in L-spine/ Left hip pain   - MRI findings noted , case d/w ortho attending  - s/p  arthrocentesis by IR   - pain control  - PT/rehab evaluation   - fall precautions   - OOB to chair     #Progress Note Handoff  IR for liver abscess drainage on 9/11  PTT for IV heparin   updated friend Dr Terrazas on number listed as emergency contact

## 2020-09-10 NOTE — PROGRESS NOTE ADULT - ASSESSMENT
ASSESSMENT  64yFemale with a PMH of hypertension, peptic ulcer disease, hypothyroidism admitted to hospital for submassive PE s/p thrombectomy of 4 clots. Patient complaining of left hip pain, underwent MRI revealing extensive necrosis of femoral head and acetabulum and joint effusion, now s/p image guided arthrocentesis by IR revealing purulent fluid high in RBCs w moderate WBCs. Patient started on ceftriaxone 2000mg IV q24hr. WBC trending down since admission    IMPRESSION  # Possible liver abscess< from: CT Abdomen and Pelvis w/ IV Cont (09.08.20 @ 22:42) >  6.1 x 4.9 x 6.0 cm multiloculated hypoattenuating structure in the left hepatic lobe, compatible with an abscess in the appropriate clinical setting.   #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 with surrounding edema/inflammation with extensive joint effusion  # Low suspicion for septic arthritis s/p image guided arthrocentesis by IR revealing 5cc "purulent" fluid with high level of RBCs and moderate WBCs  - s/p IR guided drainage: [id number] RBCs and 3351 nucleated cells (83% neutrophils)  - Blood Cx 9/3 NG, 9/5 BCX NGTD   - Hip Cx 9/4 NGTD  - Quantiferon NEGATIVE   #PE     RECOMMENDATIONS  - Consulted IR for drainage: send for G/S & culture  - Ceftriaxone 2g q24h IV and flagyl 500mg q8h IV  - PPD NEGATIVE    Please call with any questions or send a message on [employer] Teams  Spectra [phone]

## 2020-09-10 NOTE — PROGRESS NOTE ADULT - SUBJECTIVE AND OBJECTIVE BOX
64 year-old female with a PMH of PUD, HTN and hypothyroidism and a PSH of appendectomy and tonsillectomy presenting for shortness of breath, weakness and dizziness. The patient was found to have submassive pulmonary embolism and underwent thrombectomy on 8/31/20 with IR. The patient was anticoagulated with Heparin ggt. She was followed by GI for PUD.  Pt was downgraded to telemetry from ICU on 9/1/20. While in the hospital pt was diagnosed with iron deficiency amenia.   MRI of the left hip showed significant joint effusion, case discussed with ortho attending, IR consulted , pt underwent arthrocentesis. .  While in the hospital pt is spiking fever, and LFts elevated - found to have liver abscess on CT abdomen     PAST MEDICAL & SURGICAL HISTORY  HTN (hypertension)  Gastric ulcer  S/P tonsillectomy  S/P appendectomy      ALLERGIES:  No Known Allergies    Vital Signs Last 24 Hrs  Vital Signs Last 24 Hrs  T(C): 37.2 (10 Sep 2020 12:42), Max: 38.5 (10 Sep 2020 05:05)  T(F): 98.9 (10 Sep 2020 12:42), Max: 101.3 (10 Sep 2020 05:05)  HR: 98 (10 Sep 2020 12:42) (90 - 98)  BP: 110/52 (10 Sep 2020 12:42) (105/61 - 113/56)  BP(mean): --  RR: 18 (10 Sep 2020 12:42) (18 - 18)  SpO2: 93% (10 Sep 2020 07:51) (93% - 96%)    PHYSICAL EXAM:  GEN: No acute distress  HEENT: normocephalic, atraumatic  LUNGS: decreased breath sounds at bases   HEART: S1/S2 present. RRR, no murmurs  ABD: Soft, non-tender, non-distended. Bowel sounds present  EXT: NC/NC/NE/2+PP/FRANCES, decreased ROM in LLE due to pain   NEURO: AAOX3, normal affect, no focal neuro deficit       LABS:                          CBC Full  -  ( 10 Sep 2020 05:58 )  WBC Count : 7.66 K/uL  RBC Count : 2.66 M/uL  Hemoglobin : 7.6 g/dL  Hematocrit : 24.5 %  Platelet Count - Automated : 288 K/uL  Mean Cell Volume : 92.1 fL  Mean Cell Hemoglobin : 28.6 pg  Mean Cell Hemoglobin Concentration : 31.0 g/dL  Auto Neutrophil # : 5.13 K/uL  Auto Lymphocyte # : 1.83 K/uL  Auto Monocyte # : 0.50 K/uL  Auto Eosinophil # : 0.10 K/uL  Auto Basophil # : 0.02 K/uL  Auto Neutrophil % : 67.0 %  Auto Lymphocyte % : 23.9 %  Auto Monocyte % : 6.5 %  Auto Eosinophil % : 1.3 %  Auto Basophil % : 0.3 %  09-10    133<L>  |  98  |  14  ----------------------------<  123<H>  4.1   |  23  |  0.7    Ca    8.1<L>      10 Sep 2020 05:58  Mg     1.9     09-10    TPro  5.7<L>  /  Alb  2.6<L>  /  TBili  0.3  /  DBili  x   /  AST  61<H>  /  ALT  87<H>  /  AlkPhos  177<H>  09-10    Culture - Body Fluid with Gram Stain (09.04.20 @ 10:30)    Gram Stain:   No polymorphonuclear cells seen  No organisms seen    Specimen Source: .Body Fluid Joint Fluid, Left Hip    Culture - Blood (09.03.20 @ 20:52)    Specimen Source: .Blood None    Culture Results:   No growth to date.    Culture - Urine (09.03.20 @ 01:30)    Specimen Source: .Urine Clean Catch (Midstream)    Culture Results:   >100,000 CFU/ml Escherichia coli      RADIOLOGY:  < from: [facility] Duplex Lower Ext Vein Scan, Bilat (08.31.20 @ 17:39) >  Impression:    No evidence of deep venous thrombosis or superficial thrombophlebitis in the right lower extremity.    Acute left popliteal, posterior tibial peroneal vein DVT    < end of copied text >    < from: IR Procedure (08.31.20 @ 12:48) >  FINDINGS:    1. Preliminary ultrasound demonstrates patent right common femoral vein.    2. Right pulmonary artery angiogram: Extensive filling defects within the right main pulmonary artery extending into the upper and lower lobes.    3. Left pulmonary artery angiogram: Small filling defects within the left lower lobe pulmonary arterial segmental branches with oligemia.    4. Successful mechanical suction thrombectomy in right main pulmonary artery as well as left lower lobe segmental pulmonary artery utilizing Inari suction thrombectomy catheter.    < end of copied text >  < from: Transthoracic Echocardiogram (08.31.20 @ 13:18) >  Summary:   1. Normal global left ventricular systolic function.   2. LV Ejection Fraction by Hardin's Method with a biplane EF of 59 %.   3. Mild left ventricular hypertrophy.   4. Mildly increased LV wall thickness.   5. Spectral Doppler shows impaired relaxation pattern of left ventricular myocardial filling (Grade I diastolic dysfunction).   6. Normal left atrial size.   7. Normal right atrial size.   8. Structurally normal mitral valve, with normal leaflet excursion.   9. Trace mitral valve regurgitation.  10. Mild-moderate tricuspid regurgitation.  11. Sclerotic aortic valve with normal opening.  12. Mild pulmonic valve regurgitation.  13. Estimated pulmonary artery systolic pressure is 47.5 mmHg assuming a right atrial pressure of 5 mmHg, which is consistent with mild pulmonary hypertension.  14. LA volume Index is 14.6 ml/m² ml/m2.    < from: MR Hip w/wo IV Cont, Left (09.02.20 @ 19:42) >  IMPRESSION:    1. 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 Associated stress related sclerosis/edema in the left femoral neck and left acetabulum. Extensive surrounding edema/inflammation. Given these findings, septic arthritis cannot be excluded.  2. Large heterogeneous joint effusion may be due to internal hemorrhage.  3. Apparent tendon tears as above.    < end of copied text >    S/p image guided left hip aspiration, obtaining approx 5cc thick purulent fluid using a 5Fr Yueh needle. Specimen given to primary team resident.    < from: CT Abdomen and Pelvis w/ IV Cont (09.08.20 @ 22:42) >  IMPRESSION:    1.  6.1 x 4.9 x 6.0 cm multiloculated hypoattenuating structure in the left hepatic lobe, compatible with an abscess in the appropriate clinical setting. Mild associated left intrahepatic biliary ductal dilatation. Recommend imaging follow up after treatment to assess for resolution.  2.  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      < end of copied text >    MEDICATIONS  (STANDING):  cefTRIAXone   IVPB 2000 milliGRAM(s) IV Intermittent every 24 hours  chlorhexidine 4% Liquid 1 Application(s) Topical <User Schedule>  levothyroxine 50 MICROGram(s) Oral daily  lisinopril 5 milliGRAM(s) Oral daily  magnesium oxide 400 milliGRAM(s) Oral three times a day with meals  metroNIDAZOLE  IVPB 500 milliGRAM(s) IV Intermittent every 8 hours  pantoprazole   Suspension 40 milliGRAM(s) Oral two times a day  polyethylene glycol 3350 17 Gram(s) Oral daily  sodium chloride 0.9%. 1000 milliLiter(s) (75 mL/Hr) IV Continuous <Continuous>  sucralfate 1 Gram(s) Oral every 12 hours    MEDICATIONS  (PRN):  acetaminophen  Suppository .. 650 milliGRAM(s) Rectal every 6 hours PRN Temp greater or equal to 38C (100.4F)  bisacodyl 5 milliGRAM(s) Oral every 12 hours PRN Constipation  lactulose Syrup 10 Gram(s) Oral two times a day PRN constipation  lidocaine   Patch 1 Patch Transdermal daily PRN chest pain

## 2020-09-10 NOTE — PROGRESS NOTE ADULT - ASSESSMENT
Patient is a 63 y/o woman with PMH of HTN, PUD, and hypothyroidism presenting for SOB. Found to have PE. Course complicated by necrosis of femoral head; cause unknown. Patient found to have hypoechogenicity on US of liver; suspicious for abscess. CT again revealed a foci suspicious for abscess.    #Pulmonary embolism  -Had thrombectomy on 8/31  -Currently on room air, saturating well  -Holding Eliquis for IR drainage on Friday  -Started on heparin drip; follow PTT (goal 50-80), 69.2 this AM  -Lidoderm patch for chest pain    # Liver abscess  -Transaminitis with fevers Tmax 24hrs- 101.3  -AST/ALT - Downtrending   -Ultrasound findings suspicious for abscess  -CT scan with contrast performed showed a 6.1 x 4.9x 6.0 cm multiloculated structure in left hepatic lobe concerning for abscess  -ID consulted: continue ceftriaxone 2g Q24, metronidazole 500 Q8  -IR consulted; for drainage Friday 9/11  -Eliquis held; heparin gtt- hold in AM  -NPO Thursday night    # Femoral head necrosis   -Infectious vs other  -Arthrocentesis results negative  -per ortho- If no evidence of infection , the pt should complete treatment for PE and undergo elective MARIA C at a later date   -ID consulted  -PPD placed; negative  - quantiferon - negative  -PT following     # CHF  -Fluid restriction 1.5L QD  -Daily weights  -I+O    # PUD  -EGD outpatient showing deep ulcer  -Protonix 40 BID  -Carafate 1gm BID  -CBC  -keep active Type and screen     # Iron Deficiency anemia  -Monitor hemoglobin  -Transfuse if <7.5  -Hb 7.6 this AM, f/u CBC at 4pm     # HTN  -DASH  -Lisinopril    # Spinal stenosis  -PT/Rehab  -Pain control  -Fall precautions    Dispo: Pending Liver Abscess drainage Patient is a 63 y/o woman with PMH of HTN, PUD, and hypothyroidism presenting for SOB. Found to have PE. Course complicated by necrosis of femoral head; cause unknown. Patient found to have hypoechogenicity on US of liver; suspicious for abscess. CT again revealed a foci suspicious for abscess.    #Pulmonary embolism  -Had thrombectomy on 8/31  -Currently on room air, saturating well  -Holding Eliquis for IR drainage on Friday  -Started on heparin drip; follow PTT (goal 50-80), 69.2 this AM      # Liver abscess  -Transaminitis with fevers Tmax 24hrs- 101.3  -AST/ALT - Downtrending   -Ultrasound findings suspicious for abscess  -CT scan with contrast performed showed a 6.1 x 4.9x 6.0 cm multiloculated structure in left hepatic lobe concerning for abscess  -ID consulted: continue ceftriaxone 2g Q24, metronidazole 500 Q8  -IR consulted; for drainage Friday 9/11  -Eliquis held; heparin gtt- hold in AM  -NPO Thursday night    # Femoral head necrosis   -Infectious vs other  -Arthrocentesis results negative  -per ortho- If no evidence of infection , the pt should complete treatment for PE and undergo elective MARIA C at a later date   -ID consulted  -PPD placed; negative  - quantiferon - negative  -PT following     # CHF  -Fluid restriction 1.5L QD  -Daily weights  -I+O    # PUD  -EGD outpatient showing deep ulcer  -Protonix 40 BID  -Carafate 1gm BID  -CBC  -keep active Type and screen     # Iron Deficiency anemia  -Monitor hemoglobin  -Transfuse if <7.5  -Hb 7.6 this AM, f/u CBC at 4pm     #Loose Bowel movements (3)  Pt got Miralax this AM    #Monitor Hyponatremia 133 this AM    # HTN  -DASH  -Lisinopril    # Spinal stenosis  -PT/Rehab  -Pain control  -Fall precautions    Dispo: Pending Liver Abscess drainage

## 2020-09-10 NOTE — PROGRESS NOTE ADULT - SUBJECTIVE AND OBJECTIVE BOX
DAILY PROGRESS NOTE  ===========================================================    Patient Information:  IOANA POTTER  /  64y  /  Female  /  MRN#: [id number]    Hospital Day: 11d     |:::::::::::::::::::::::::::| SUBJECTIVE |:::::::::::::::::::::::::::|  Patient is a 64y old woman with PMH of HTN, PUD and hypothyroidism coming in for shortness of breath. Found to have bilateral PE on CT angio chest. Patient underwent thrombectomy. Hospital course complicated by necrosis of femoral head found on MRI. Arthrocentesis performed but was negative. Patient with fevers throughout stay but no infectious source as of now.    Patient meant for discharge yesterday however had transaminitis for which ultrasound was offered. Ultrasound performed showed hypoechogenicity in left liver. CT scan with contrast performed showed a 6.1 x 4.9x 6.0 cm multiloculated structure in left hepatic lobe concerning for abscess. ID consulted and stated to initiate on ceftriaxone 2g QD and metronidazole 500 q8hrs. Spoke to IR who stated she can have drainage on Friday. Patient placed on heparin drip in the interim.      OVERNIGHT EVENTS:   TODAY: Patient was seen today at bedside. Appears comfortable, denies abdominal pain. Review of systems is otherwise negative.    |:::::::::::::::::::::::::::| OBJECTIVE |:::::::::::::::::::::::::::|    VITAL SIGNS: Last 24 Hours  T(C): 37.1 (10 Sep 2020 07:51), Max: 38.5 (10 Sep 2020 05:05)  T(F): 98.7 (10 Sep 2020 07:51), Max: 101.3 (10 Sep 2020 05:05)  HR: 92 (10 Sep 2020 05:05) (90 - 95)  BP: 113/56 (10 Sep 2020 05:05) (105/61 - 119/64)  BP(mean): --  RR: 18 (10 Sep 2020 05:05) (18 - 19)  SpO2: 93% (10 Sep 2020 07:51) (93% - 96%)    PHYSICAL EXAM:  GENERAL:   Awake, alert; NAD.  HEENT:  Head NC/AT; Conjunctivae pink, Sclera anicteric; Oral mucosa moist.  CARDIO:   Regular rate; Regular rhythm; S1 & S2.  RESP:   No rales, wheezing, or rhonchi appreciated.  GI:   Soft; NT/ND; BS; No guarding; No rebound tenderness.  EXT:   Stregnth UE 5/5; Strength LE 5/5; No edema.   SKIN:   Intact.    LAB RESULTS:                        7.6    7.66  )-----------( 288      ( 10 Sep 2020 05:58 )             24.5     09-10    133<L>  |  98  |  14  ----------------------------<  123<H>  4.1   |  23  |  0.7    Ca    8.1<L>      10 Sep 2020 05:58  Mg     1.9     09-10    TPro  5.7<L>  /  Alb  2.6<L>  /  TBili  0.3  /  DBili  x   /  AST  61<H>  /  ALT  87<H>  /  AlkPhos  177<H>  09-10    PTT - ( 10 Sep 2020 05:58 )  PTT:69.2 sec            MICROBIOLOGY:    RADIOLOGY:  < from: CT Abdomen and Pelvis w/ IV Cont (09.08.20 @ 22:42) >    1.  6.1 x 4.9 x 6.0 cm multiloculated hypoattenuating structure in the left hepatic lobe, compatible with an abscess in the appropriate clinical setting. Mild associated left intrahepatic biliary ductal dilatation. Recommend imaging follow up after treatment to assess for resolution.  2.  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    < end of copied text >    < from: US Abdomen Limited (09.08.20 @ 14:41) >    IMPRESSION:    Ill-defined hypoechoic foci of the left hepatic lobe measuring up to 4 cm.    Hepatic abscess is not excluded and further evaluation with a postcontrast CT is recommended    Dr. Vargas was made aware of the above findings on 9/8/2020 at 4:29 PM with read back    < end of copied text >  ALLERGIES:  No Known Allergies      ===========================================================

## 2020-09-10 NOTE — PROGRESS NOTE ADULT - SUBJECTIVE AND OBJECTIVE BOX
IOANA POTTER  64y, Female  Allergy: No Known Allergies      LOS  11d    CHIEF COMPLAINT: pulmonary embolism (10 Sep 2020 10:38)      INTERVAL EVENTS/HPI  - No acute events overnight  - T(F): , Max: 101.3 (09-10-20 @ 05:05)  - Denies any worsening symptoms  - Tolerating medication  - WBC Count: 7.66 (09-10-20 @ 05:58)  WBC Count: 8.69 (09-09-20 @ 06:18)  - Creatinine, Serum: 0.7 (09-10-20 @ 05:58)  Creatinine, Serum: 0.7 (09-09-20 @ 06:18)       ROS  General: Denies rigors, nightsweats  HEENT: Denies headache, rhinorrhea, sore throat, eye pain  CV: Denies CP, palpitations  PULM: Denies wheezing, hemoptysis  GI: Denies hematemesis, hematochezia, melena  : Denies discharge, hematuria  MSK: Denies arthralgias, myalgias  SKIN: Denies rash, lesions  NEURO: Denies paresthesias, weakness  PSYCH: Denies depression, anxiety    VITALS:  T(F): 98.9, Max: 101.3 (09-10-20 @ 05:05)  HR: 98  BP: 110/52  RR: 18Vital Signs Last 24 Hrs  T(C): 37.2 (10 Sep 2020 12:42), Max: 38.5 (10 Sep 2020 05:05)  T(F): 98.9 (10 Sep 2020 12:42), Max: 101.3 (10 Sep 2020 05:05)  HR: 98 (10 Sep 2020 12:42) (90 - 98)  BP: 110/52 (10 Sep 2020 12:42) (105/61 - 113/56)  BP(mean): --  RR: 18 (10 Sep 2020 12:42) (18 - 18)  SpO2: 93% (10 Sep 2020 07:51) (93% - 96%)    PHYSICAL EXAM:  Gen: NAD, resting in bed  HEENT: Normocephalic, atraumatic  Neck: supple, no lymphadenopathy  CV: Regular rate & regular rhythm  Lungs: decreased BS at bases, no fremitus  Abdomen: Soft, BS present  Ext: Warm, well perfused  Neuro: non focal, awake  Skin: no rash, no erythema  Lines: no phlebitis    FH: Non-contributory  Social Hx: Non-contributory    TESTS & MEASUREMENTS:                        7.6    7.66  )-----------( 288      ( 10 Sep 2020 05:58 )             24.5     09-10    133<L>  |  98  |  14  ----------------------------<  123<H>  4.1   |  23  |  0.7    Ca    8.1<L>      10 Sep 2020 05:58  Mg     1.9     09-10    TPro  5.7<L>  /  Alb  2.6<L>  /  TBili  0.3  /  DBili  x   /  AST  61<H>  /  ALT  87<H>  /  AlkPhos  177<H>  09-10    eGFR if Non African American: 90 mL/min/1.73M2 (09-10-20 @ 05:58)  eGFR if African American: 105 mL/min/1.73M2 (09-10-20 @ 05:58)    LIVER FUNCTIONS - ( 10 Sep 2020 05:58 )  Alb: 2.6 g/dL / Pro: 5.7 g/dL / ALK PHOS: 177 U/L / ALT: 87 U/L / AST: 61 U/L / GGT: x               Culture - Blood (collected 09-06-20 @ 06:53)  Source: .Blood None  Preliminary Report (09-07-20 @ 18:01):    No growth to date.    Culture - Blood (collected 09-05-20 @ 11:42)  Source: .Blood None  Preliminary Report (09-06-20 @ 19:01):    No growth to date.    Culture - Body Fluid with Gram Stain (collected 09-04-20 @ 10:30)  Source: .Body Fluid Joint Fluid, Left Hip  Gram Stain (09-04-20 @ 22:29):    No polymorphonuclear cells seen    No organisms seen  Preliminary Report (09-05-20 @ 17:11):    No growth    Culture - Blood (collected 09-03-20 @ 20:52)  Source: .Blood None  Final Report (09-09-20 @ 04:01):    No Growth Final    Culture - Urine (collected 09-03-20 @ 18:00)  Source: .Urine Catheterized  Final Report (09-05-20 @ 20:47):    <10,000 CFU/mL Normal Urogenital Julianne    Culture - Urine (collected 09-03-20 @ 01:30)  Source: .Urine Clean Catch (Midstream)  Final Report (09-05-20 @ 18:36):    >100,000 CFU/ml Escherichia coli  Organism: Escherichia coli (09-05-20 @ 18:36)  Organism: Escherichia coli (09-05-20 @ 18:36)      -  Amikacin: S <=16      -  Amoxicillin/Clavulanic Acid: S <=8/4      -  Ampicillin: R >16 These ampicillin results predict results for amoxicillin      -  Ampicillin/Sulbactam: I 16/8 Enterobacter, Citrobacter, and Serratia may develop resistance during prolonged therapy (3-4 days)      -  Aztreonam: S <=4      -  Cefazolin: S <=2 (MIC_CL_COM_ENTERIC_CEFAZU) For uncomplicated UTI with K. pneumoniae, E. coli, or P. mirablis: HAKAN <=16 is sensitive and HAKAN >=32 is resistant. This also predicts results for oral agents cefaclor, cefdinir, cefpodoxime, cefprozil, cefuroxime axetil, cephalexin and locarbef for uncomplicated UTI. Note that some isolates may be susceptible to these agents while testing resistant to cefazolin.      -  Cefepime: S <=2      -  Cefoxitin: S <=8      -  Ceftriaxone: S <=1 Enterobacter, Citrobacter, and Serratia may develop resistance during prolonged therapy      -  Ciprofloxacin: S <=0.25      -  Ertapenem: S <=0.5      -  Gentamicin: S <=2      -  Imipenem: S <=1      -  Levofloxacin: S <=0.5      -  Meropenem: S <=1      -  Nitrofurantoin: S <=32 Should not be used to treat pyelonephritis      -  Piperacillin/Tazobactam: S <=8      -  Tigecycline: S <=2      -  Tobramycin: S <=2      -  Trimethoprim/Sulfamethoxazole: R >2/38      Method Type: HAKAN            INFECTIOUS DISEASES TESTING  Procalcitonin, Serum: 0.23 (09-09-20 @ 06:18)  COVID-19 PCR: NotDetec (09-08-20 @ 10:02)  MRSA PCR Result.: Negative (09-05-20 @ 17:00)  COVID-19 PCR: NotDetec (08-30-20 @ 16:49)      INFLAMMATORY MARKERS      RADIOLOGY & ADDITIONAL TESTS:  I have personally reviewed the last available Chest xray  CXR      CT  CT Abdomen and Pelvis w/ IV Cont:   EXAM:  CT ABDOMEN AND PELVIS IC            PROCEDURE DATE:  09/08/2020            INTERPRETATION:  CLINICAL STATEMENT: Transaminitis    TECHNIQUE: Contiguous axial CT images were obtained from the lower chest to the pubic symphysis following administration of 100 cc Omnipaque 350 intravenous contrast.  Oral contrast was not administered.  Reformatted images in the coronal and sagittal planes were acquired.    COMPARISON: US ABDOMEN 9/8/2020.    FINDINGS:    LOWER CHEST: Bibasilar atelectasis.    HEPATOBILIARY: 6.1 x 4.9 x 6.0 cm multiloculated hypoattenuating structure in the left hepatic lobe, compatible with an abscess in the appropriate clinical setting - did not appear to be present on chest CT of 8/30/2020 where the liver was partiallyimaged. Mild left intrahepatic biliary ductal dilatation secondary to mass effect. Contracted gallbladder.    SPLEEN: Unremarkable.    PANCREAS: Unremarkable.    ADRENAL GLANDS: Unremarkable.    KIDNEYS: No hydronephrosis.    ABDOMINOPELVIC NODES: Unremarkable.    PELVIC ORGANS: Vaginal pessary noted.    PERITONEUM/MESENTERY/BOWEL: No evidence of bowel obstruction, free air, or ascites.    BONES/SOFT TISSUES: Extensive destructive changes of the left femoral head as seen on MRI of 9/2/2020 with surrounding inflammatory change in this patient with septic arthritis. Hyperdense material in the left hip joint likely from recent arthrocentesis. Degenerative changes of the spine    OTHER: Circumaortic left renal vein. Atherosclerotic calcificationsof the aorta.    IMPRESSION:    1.  6.1 x 4.9 x 6.0 cm multiloculated hypoattenuating structure in the left hepatic lobe, compatible with an abscess in the appropriate clinical setting. Mild associated left intrahepatic biliary ductal dilatation. Recommend imaging follow up after treatment to assess for resolution.  2.  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              THERON WILEY M.D., RESIDENT RADIOLOGIST  This document has been electronically signed.  MARTINEZ PAGAN M.D., ATTENDING RADIOLOGIST  This document has been electronically signed. Sep  9 2020  9:37AM             (09-08-20 @ 22:42)      CARDIOLOGY TESTING  12 Lead ECG:   Ventricular Rate 91 BPM    Atrial Rate 91 BPM    P-R Interval 128 ms    QRS Duration 72 ms    Q-T Interval 348 ms    QTC Calculation(Bezet) 428 ms    P Axis 40 degrees    R Axis -23 degrees    T Axis 11 degrees    Diagnosis Line Normal sinus rhythm  Possible Left atrial enlargement  Low voltage QRS  Possible Lateral infarct , age undetermined  Abnormal ECG    Confirmed by ALDA KRAMER MD ([id number]) on 9/7/2020 11:16:38 PM (09-05-20 @ 13:42)  Transthoracic Echocardiogram:    EXAM:  2-D ECHO (TTE) COMPLETE        PROCEDURE DATE:  08/31/2020      INTERPRETATION:  REPORT:  TRANSTHORACIC ECHOCARDIOGRAM REPORT        Patient Name:   IOANA POTTER Accession #: [id number]  Medical Rec #:  [id number]         Height:      63.0 in 160.0 cm  YOB: 1955        Weight:      185.0 lb 83.91 kg  Patient Age:    64 years          BSA:         1.87 m²  Patient Gender: F                 BP:          116/66 mmHg      Date of Exam:        8/31/2020 1:18:07 PM  Referring Physician: [id number] IRINA NAILS  Sonographer:         Felicia Griffin  Reading Physician:   Livier Colvin M.D.    Procedure:   2D Echo/Doppler/Color Doppler Complete.  Indications: R07.9 - Chest Pain, unspecified  Diagnosis:   Chest pain, unspecified - R07.9        Summary:   1. Normal global left ventricular systolic function.   2. LV Ejection Fraction by Hardin's Method with a biplane EF of 59 %.   3. Mild left ventricular hypertrophy.   4. Mildly increased LV wall thickness.   5. Spectral Doppler shows impaired relaxation pattern of left ventricular myocardial filling (Grade I diastolic dysfunction).   6. Normal left atrial size.   7. Normal right atrial size.   8. Structurally normal mitral valve, with normal leaflet excursion.   9. Trace mitral valve regurgitation.  10. Mild-moderate tricuspid regurgitation.  11. Sclerotic aortic valve with normal opening.  12. Mild pulmonic valve regurgitation.  13. Estimated pulmonary artery systolic pressure is 47.5 mmHg assuming a right atrial pressure of 5 mmHg, which is consistent with mild pulmonary hypertension.  14. LA volume Index is 14.6 ml/m² ml/m2.    PHYSICIAN INTERPRETATION:  Left Ventricle: The left ventricular internal cavity size is normal. Left ventricular wall thicknessis mildly increased. There is mild left ventricular hypertrophy. Global LV systolic function was normal. Spectral Doppler shows impaired relaxation pattern of left ventricular myocardial filling (Grade I diastolic dysfunction). Normal LV filling pressures.  Right Ventricle: Normal right ventricular size and function.  Left Atrium: Normal left atrial size. LA volume Index is 14.6 ml/m² ml/m2.  Right Atrium: Normal right atrial size.  Pericardium: There is no evidence of pericardial effusion.  Mitral Valve: Structurally normal mitral valve, with normal leaflet excursion. Trace mitral valve regurgitation is seen.  Tricuspid Valve: Structurally normal tricuspid valve, with normal leaflet excursion. Mild-moderate tricuspid regurgitation is visualized. Estimated pulmonary artery systolic pressure is 47.5 mmHg assuming a right atrial pressure of 5 mmHg, which is consistent with mild pulmonary hypertension.  Aortic Valve: The aortic valve is trileaflet. Sclerotic aortic valve with normal opening.No evidence of aortic valve regurgitation is seen.  Pulmonic Valve: Structurally normal pulmonic valve, with normal leaflet excursion. Mild pulmonic valve regurgitation.  Aorta: The aortic root and ascending aorta are structurally normal, with no evidence of dilitation.  Pulmonary Artery: The main pulmonary artery is normal in size.  Venous: The inferior vena cava was normal sized, with respiratory size variation greater than 50%.      2D AND M-MODE MEASUREMENTS (normal ranges within parentheses):  Left                  Normal   Aorta/Left             Normal  Ventricle:                     Atrium:  IVSd (2D):  1.13 cm  (0.7-1.1) AoV Cusp       1.76  (1.5-2.6)  LVPWd (2D): 0.72 cm  (0.7-1.1) Separation:     cm  LVIDd (2D): 3.98 cm  (3.4-5.7) Left Atrium    2.70  (1.9-4.0)  LVIDs (2D): 2.75 cm            (Mmode):        cm  LV FS (2D):  30.9 %   (>25%)   LA Volume      14.5  Relative      0.36    (<0.42)  Index         ml/m²  Wall                           Right  Thickness   Ventricle:                                 RVd (2D):      3.19 cm    SPECTRAL DOPPLER ANALYSIS:  LV DIASTOLIC FUNCTION:  MV Peak E: 0.49 m/s Decel Time: 270 msec  MV Peak A: 0.90 m/s  E/A Ratio: 0.54    LVOT Vmax: 1.31 m/s  LVOT VTI:  0.18 m  LVOTDiam: 1.77 cm    Mitral Valve:  MV P1/2 Time: 78.40 msec  MV Area, PHT: 2.81 cm²    Tricuspid Valve and PA/RV Systolic Pressure: TR Max Velocity: 3.26 m/s RA Pressure: 5 mmHg RVSP/PASP: 47.5 mmHg      [id number] Livier Colvin M.D., Electronically signedon 8/31/2020 at 4:35:15 PM            *** Final ***              LIVIER COLVIN M.D., RESIDENT RADIOLOGIST  This document has been electronically signed.  LIVIER COLVIN M.D., RESIDENT RADIOLOGIST  This document has been electronically signed. Aug [id number]  1:18PM             (08-31-20 @ 13:18)      MEDICATIONS  cefTRIAXone   IVPB 2000 IV Intermittent every 24 hours  chlorhexidine 4% Liquid 1 Topical <User Schedule>  heparin  Infusion 1700 IV Continuous <Continuous>  levothyroxine 50 Oral daily  lisinopril 5 Oral daily  metroNIDAZOLE  IVPB 500 IV Intermittent every 8 hours  pantoprazole   Suspension 40 Oral two times a day  polyethylene glycol 3350 17 Oral daily  sodium chloride 0.9%. 1000 IV Continuous <Continuous>  sucralfate 1 Oral every 12 hours      WEIGHT  Weight (kg): 87.3 (09-01-20 @ 15:49)  Creatinine, Serum: 0.7 mg/dL (09-10-20 @ 05:58)      ANTIBIOTICS:  cefTRIAXone   IVPB 2000 milliGRAM(s) IV Intermittent every 24 hours  metroNIDAZOLE  IVPB 500 milliGRAM(s) IV Intermittent every 8 hours      All available historical records have been reviewed

## 2020-09-11 LAB
ALBUMIN SERPL ELPH-MCNC: 2.8 G/DL — LOW (ref 3.5–5.2)
ALP SERPL-CCNC: 138 U/L — HIGH (ref 30–115)
ALT FLD-CCNC: 56 U/L — HIGH (ref 0–41)
ANION GAP SERPL CALC-SCNC: 12 MMOL/L — SIGNIFICANT CHANGE UP (ref 7–14)
APTT BLD: 82.5 SEC — CRITICAL HIGH (ref 27–39.2)
APTT BLD: 98.9 SEC — CRITICAL HIGH (ref 27–39.2)
AST SERPL-CCNC: 32 U/L — SIGNIFICANT CHANGE UP (ref 0–41)
BILIRUB SERPL-MCNC: 0.2 MG/DL — SIGNIFICANT CHANGE UP (ref 0.2–1.2)
BUN SERPL-MCNC: 13 MG/DL — SIGNIFICANT CHANGE UP (ref 10–20)
CALCIUM SERPL-MCNC: 8.1 MG/DL — LOW (ref 8.5–10.1)
CHLORIDE SERPL-SCNC: 102 MMOL/L — SIGNIFICANT CHANGE UP (ref 98–110)
CO2 SERPL-SCNC: 24 MMOL/L — SIGNIFICANT CHANGE UP (ref 17–32)
CREAT SERPL-MCNC: 0.7 MG/DL — SIGNIFICANT CHANGE UP (ref 0.7–1.5)
CULTURE RESULTS: SIGNIFICANT CHANGE UP
GLUCOSE SERPL-MCNC: 108 MG/DL — HIGH (ref 70–99)
HCT VFR BLD CALC: 24.9 % — LOW (ref 37–47)
HGB BLD-MCNC: 7.6 G/DL — LOW (ref 12–16)
MAGNESIUM SERPL-MCNC: 2 MG/DL — SIGNIFICANT CHANGE UP (ref 1.8–2.4)
MCHC RBC-ENTMCNC: 28.7 PG — SIGNIFICANT CHANGE UP (ref 27–31)
MCHC RBC-ENTMCNC: 30.5 G/DL — LOW (ref 32–37)
MCV RBC AUTO: 94 FL — SIGNIFICANT CHANGE UP (ref 81–99)
NRBC # BLD: 0 /100 WBCS — SIGNIFICANT CHANGE UP (ref 0–0)
PLATELET # BLD AUTO: 214 K/UL — SIGNIFICANT CHANGE UP (ref 130–400)
POTASSIUM SERPL-MCNC: 3.8 MMOL/L — SIGNIFICANT CHANGE UP (ref 3.5–5)
POTASSIUM SERPL-SCNC: 3.8 MMOL/L — SIGNIFICANT CHANGE UP (ref 3.5–5)
PROT SERPL-MCNC: 5.7 G/DL — LOW (ref 6–8)
RBC # BLD: 2.65 M/UL — LOW (ref 4.2–5.4)
RBC # FLD: 13.2 % — SIGNIFICANT CHANGE UP (ref 11.5–14.5)
SODIUM SERPL-SCNC: 138 MMOL/L — SIGNIFICANT CHANGE UP (ref 135–146)
SPECIMEN SOURCE: SIGNIFICANT CHANGE UP
WBC # BLD: 6.22 K/UL — SIGNIFICANT CHANGE UP (ref 4.8–10.8)
WBC # FLD AUTO: 6.22 K/UL — SIGNIFICANT CHANGE UP (ref 4.8–10.8)

## 2020-09-11 PROCEDURE — 49405 IMAGE CATH FLUID COLXN VISC: CPT

## 2020-09-11 PROCEDURE — 99152 MOD SED SAME PHYS/QHP 5/>YRS: CPT

## 2020-09-11 PROCEDURE — 99233 SBSQ HOSP IP/OBS HIGH 50: CPT

## 2020-09-11 RX ORDER — HEPARIN SODIUM 5000 [USP'U]/ML
1500 INJECTION INTRAVENOUS; SUBCUTANEOUS
Qty: 25000 | Refills: 0 | Status: DISCONTINUED | OUTPATIENT
Start: 2020-09-11 | End: 2020-09-11

## 2020-09-11 RX ORDER — HEPARIN SODIUM 5000 [USP'U]/ML
1400 INJECTION INTRAVENOUS; SUBCUTANEOUS
Qty: 25000 | Refills: 0 | Status: DISCONTINUED | OUTPATIENT
Start: 2020-09-11 | End: 2020-09-12

## 2020-09-11 RX ORDER — HEPARIN SODIUM 5000 [USP'U]/ML
7000 INJECTION INTRAVENOUS; SUBCUTANEOUS EVERY 6 HOURS
Refills: 0 | Status: DISCONTINUED | OUTPATIENT
Start: 2020-09-11 | End: 2020-09-12

## 2020-09-11 RX ORDER — HEPARIN SODIUM 5000 [USP'U]/ML
3500 INJECTION INTRAVENOUS; SUBCUTANEOUS EVERY 6 HOURS
Refills: 0 | Status: DISCONTINUED | OUTPATIENT
Start: 2020-09-11 | End: 2020-09-12

## 2020-09-11 RX ORDER — IRON SUCROSE 20 MG/ML
100 INJECTION, SOLUTION INTRAVENOUS
Refills: 0 | Status: DISCONTINUED | OUTPATIENT
Start: 2020-09-11 | End: 2020-09-11

## 2020-09-11 RX ORDER — HEPARIN SODIUM 5000 [USP'U]/ML
1400 INJECTION INTRAVENOUS; SUBCUTANEOUS
Qty: 25000 | Refills: 0 | Status: DISCONTINUED | OUTPATIENT
Start: 2020-09-11 | End: 2020-09-11

## 2020-09-11 RX ADMIN — HEPARIN SODIUM 1400 UNIT(S)/HR: 5000 INJECTION INTRAVENOUS; SUBCUTANEOUS at 18:47

## 2020-09-11 RX ADMIN — Medication 100 MILLIGRAM(S): at 05:15

## 2020-09-11 RX ADMIN — LISINOPRIL 5 MILLIGRAM(S): 2.5 TABLET ORAL at 05:16

## 2020-09-11 RX ADMIN — Medication 100 MILLIGRAM(S): at 13:09

## 2020-09-11 RX ADMIN — Medication 650 MILLIGRAM(S): at 21:42

## 2020-09-11 RX ADMIN — Medication 50 MICROGRAM(S): at 05:16

## 2020-09-11 RX ADMIN — Medication 1 GRAM(S): at 17:19

## 2020-09-11 RX ADMIN — Medication 1 GRAM(S): at 05:16

## 2020-09-11 RX ADMIN — CEFTRIAXONE 100 MILLIGRAM(S): 500 INJECTION, POWDER, FOR SOLUTION INTRAMUSCULAR; INTRAVENOUS at 11:11

## 2020-09-11 RX ADMIN — Medication 650 MILLIGRAM(S): at 05:16

## 2020-09-11 RX ADMIN — PANTOPRAZOLE SODIUM 40 MILLIGRAM(S): 20 TABLET, DELAYED RELEASE ORAL at 18:46

## 2020-09-11 RX ADMIN — CHLORHEXIDINE GLUCONATE 1 APPLICATION(S): 213 SOLUTION TOPICAL at 05:17

## 2020-09-11 RX ADMIN — Medication 100 MILLIGRAM(S): at 21:42

## 2020-09-11 RX ADMIN — PANTOPRAZOLE SODIUM 40 MILLIGRAM(S): 20 TABLET, DELAYED RELEASE ORAL at 05:16

## 2020-09-11 NOTE — PROCEDURE NOTE - PROCEDURE FINDINGS AND DETAILS
Successful placement of a left hepatic lobe abscess 8.5 Maltese pigtail catheter. Approximately 20 ml of purulent fluid aspirated.

## 2020-09-11 NOTE — PROGRESS NOTE ADULT - SUBJECTIVE AND OBJECTIVE BOX
DAILY PROGRESS NOTE  ===========================================================    Patient Information:  IOANA POTTER  /  64y  /  Female  /  MRN#: [id number]    Hospital Day: 12d     |:::::::::::::::::::::::::::| SUBJECTIVE |:::::::::::::::::::::::::::|    OVERNIGHT EVENTS:   TODAY: Patient was seen today at bedside. Review of systems is otherwise negative.    |:::::::::::::::::::::::::::| OBJECTIVE |:::::::::::::::::::::::::::|    VITAL SIGNS: Last 24 Hours  T(C): 37.1 (11 Sep 2020 06:30), Max: 38.2 (11 Sep 2020 05:00)  T(F): 98.7 (11 Sep 2020 06:30), Max: 100.8 (11 Sep 2020 05:00)  HR: 88 (11 Sep 2020 05:00) (88 - 98)  BP: 122/65 (11 Sep 2020 05:00) (110/52 - 122/65)  BP(mean): --  RR: 18 (11 Sep 2020 05:00) (18 - 18)  SpO2: 94% (11 Sep 2020 08:28) (93% - 94%)    PHYSICAL EXAM:  GENERAL:   Awake, alert; NAD.  HEENT:  Head NC/AT; Conjunctivae pink, Sclera anicteric; Oral mucosa moist.  CARDIO:   Regular rate; Regular rhythm; S1 & S2.  RESP:   No rales, wheezing, or rhonchi appreciated.  GI:   Soft; NT/ND; BS; No guarding; No rebound tenderness.  EXT:   Stregnth UE 5/5; Strength LE 5/5; No edema.   SKIN:   Intact.    LAB RESULTS:                        7.6    6.22  )-----------( 214      ( 11 Sep 2020 06:52 )             24.9     09-11    138  |  102  |  13  ----------------------------<  108<H>  3.8   |  24  |  0.7    Ca    8.1<L>      11 Sep 2020 06:52  Mg     2.0     09-11    TPro  5.7<L>  /  Alb  2.8<L>  /  TBili  0.2  /  DBili  x   /  AST  32  /  ALT  56<H>  /  AlkPhos  138<H>  09-11    PTT - ( 11 Sep 2020 06:52 )  PTT:82.5 sec            MICROBIOLOGY:    RADIOLOGY:    ALLERGIES:  No Known Allergies      =========================================================== DAILY PROGRESS NOTE  ===========================================================    Patient Information:  IOANA POTTER  /  64y  /  Female  /  MRN#: [id number]    Hospital Day: 12d     |:::::::::::::::::::::::::::| SUBJECTIVE |:::::::::::::::::::::::::::|    OVERNIGHT EVENTS: None  TODAY: Patient was seen today at bedside. Appears comfortable.  Review of systems is otherwise negative.    |:::::::::::::::::::::::::::| OBJECTIVE |:::::::::::::::::::::::::::|    VITAL SIGNS: Last 24 Hours  T(C): 37.1 (11 Sep 2020 06:30), Max: 38.2 (11 Sep 2020 05:00)  T(F): 98.7 (11 Sep 2020 06:30), Max: 100.8 (11 Sep 2020 05:00)  HR: 88 (11 Sep 2020 05:00) (88 - 98)  BP: 122/65 (11 Sep 2020 05:00) (110/52 - 122/65)  BP(mean): --  RR: 18 (11 Sep 2020 05:00) (18 - 18)  SpO2: 94% (11 Sep 2020 08:28) (93% - 94%)    PHYSICAL EXAM:  GENERAL:   Awake, alert; NAD.  HEENT:  Head NC/AT; Conjunctivae pink, Sclera anicteric; Oral mucosa moist.  CARDIO:   Regular rate; Regular rhythm; S1 & S2.  RESP:   No rales, wheezing, or rhonchi appreciated.  GI:   Soft; NT/ND; BS; No guarding; No rebound tenderness.  EXT:    No edema.   SKIN:   Intact.    LAB RESULTS:                        7.6    6.22  )-----------( 214      ( 11 Sep 2020 06:52 )             24.9     09-11    138  |  102  |  13  ----------------------------<  108<H>  3.8   |  24  |  0.7    Ca    8.1<L>      11 Sep 2020 06:52  Mg     2.0     09-11    TPro  5.7<L>  /  Alb  2.8<L>  /  TBili  0.2  /  DBili  x   /  AST  32  /  ALT  56<H>  /  AlkPhos  138<H>  09-11    PTT - ( 11 Sep 2020 06:52 )  PTT:82.5 sec            MICROBIOLOGY:    RADIOLOGY:    ALLERGIES:  No Known Allergies      ===========================================================

## 2020-09-11 NOTE — PROGRESS NOTE ADULT - ASSESSMENT
Patient is a 65 y/o woman with PMH of HTN, PUD, and hypothyroidism presenting for SOB. Found to have PE. Course complicated by necrosis of femoral head; cause unknown. Patient found to have hypoechogenicity on US of liver; suspicious for abscess. CT again revealed a foci suspicious for abscess.    #Pulmonary embolism  -Had thrombectomy on 8/31  -Currently on room air, saturating well  -Holding Eliquis for IR drainage on Friday  -Started on heparin drip; follow PTT (goal 50-80), 69.2 this AM      # Liver abscess  -Transaminitis with fevers Tmax 24hrs- 101.3  -AST/ALT - Downtrending   -Ultrasound findings suspicious for abscess  -CT scan with contrast performed showed a 6.1 x 4.9x 6.0 cm multiloculated structure in left hepatic lobe concerning for abscess  -ID consulted: continue ceftriaxone 2g Q24, metronidazole 500 Q8  -IR consulted; for drainage Friday 9/11  -Eliquis held; heparin gtt- hold in AM  -NPO Thursday night    # Femoral head necrosis   -Infectious vs other  -Arthrocentesis results negative  -per ortho- If no evidence of infection , the pt should complete treatment for PE and undergo elective MARIA C at a later date   -ID consulted  -PPD placed; negative  - quantiferon - negative  -PT following     # CHF  -Fluid restriction 1.5L QD  -Daily weights  -I+O    # PUD  -EGD outpatient showing deep ulcer  -Protonix 40 BID  -Carafate 1gm BID  -CBC  -keep active Type and screen     # Iron Deficiency anemia  -Monitor hemoglobin  -Transfuse if <7.5  -Hb 7.6 this AM, f/u CBC at 4pm     #Loose Bowel movements (3)  Pt got Miralax this AM    #Monitor Hyponatremia 133 this AM    # HTN  -DASH  -Lisinopril    # Spinal stenosis  -PT/Rehab  -Pain control  -Fall precautions    Dispo: Pending Liver Abscess drainage Patient is a 65 y/o woman with PMH of HTN, PUD, and hypothyroidism presenting for SOB. Found to have PE. Course complicated by necrosis of femoral head; cause unknown. Patient found to have hypoechogenicity on US of liver; suspicious for abscess. CT again revealed a foci suspicious for abscess.    #Pulmonary embolism  -Had thrombectomy on 8/31  -Currently on room air, saturating well  -Holding Eliquis for IR drainage today  -Started on heparin drip; follow PTT (goal 50-80), 82.5 this AM      # Liver abscess  -Transaminitis with fevers Tmax 24hrs- 100.8  -AST/ALT - Downtrending   -CT scan with contrast performed showed a 6.1 x 4.9x 6.0 cm multiloculated structure in left hepatic lobe concerning for abscess  -ID consulted: continue ceftriaxone 2g Q24, metronidazole 500 Q8  -IR consulted; for drainage Friday 9/11, f/u G/S & culture, AFB  -Eliquis held; heparin gtt- hold in AM  -NPO Thursday night    # L Femoral head necrosis   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 with surrounding edema/inflammation with extensive joint effusion  -per ortho- If no evidence of infection , the pt should complete treatment for PE and undergo elective MARIA C at a later date   - s/p IR guided drainage: 169693 RBCs and 3351 nucleated cells (83% neutrophils)  - Blood Cx 9/3 NG, 9/5 BCX NGTD   - Hip Cx 9/4 NGTD  -PPD placed; negative; quantiferon - negative  -PT following     # CHF  -Fluid restriction 1.5L QD  -Daily weights  -I+O    # PUD  -EGD outpatient showing deep ulcer  -Protonix 40 BID  -Carafate 1gm BID  -CBC  -keep active Type and screen     # Iron Deficiency anemia s/p IV  Venofer 200mg 09/03/2020  -Monitor hemoglobin  -Transfuse if <7.5  -Hb 7.6 --> 8.4 -->7.6     #Loose Bowel movements (3)  Pt got Miralax - held, monitor for the next 36 hours, if unresolving, perform cdiff PCR    #Monitor for Hyponatremia   - Ranges from 133-138      # HTN  -DASH  -Lisinopril    # Spinal stenosis  -PT/Rehab  -Pain control  -Fall precautions    Dispo: Pending Liver Abscess drainage

## 2020-09-11 NOTE — PHARMACOTHERAPY INTERVENTION NOTE - COMMENTS
Prescriber was contacted to confirm order for Venofer 100mg ivpb q24h x 5days due to pt already received Venofer 200mg IVPB q24h x 3 days.

## 2020-09-11 NOTE — PROGRESS NOTE ADULT - ASSESSMENT
ASSESSMENT AND PLAN:    The patient is a 64 year-old female with a PMH of HTN and hypothyroidism and a PSH of appendectomy and tonsillectomy presenting for shortness of breath, admitted for pulmonary embolism.    # Liver abscess  on CT  - restarted on rocephin and flagyl   - discussed with Id Dr marshall  - IR for drainage today - restart diet after , follow up with IR when AC is ok to be restarted  - unclear etiology of liver abscess   - no further diarrhea   - check cultures and cytology     # transaminitis   - possibly combined due to liver abscess and medication induced - cont to monitor      # Acute respiratory failure due to  submassive acute pulmonary embolism  - resolved   - s/p thrombectomy by  IR on 8/31  - pt is off oxygen and comfortable at rest now on RA  -resume iv heparin when ok with IR and then if stable will switch back to eliquis- monitor pulse Ox   -  Lidoderm patch for pleuritic chest pain     # Acute diastolic CHF/ PHTN  - resolved   - c/w  Fluid restriction 1500 ml in 24 hours, daily weight  - low sodium diet, intake and output monitoring   - pt is clinically improving after thrombectomy   - troponin WNL   - monitor for fluid overload       #  PUD Gastric ulcer at pylorus/ Gastritis   - pt has recent EGD , report is in the paper chart  - biopsy was negative for H.Pylori / dysplasia    - Protonix 40 mg BID    - Carafate 1 gm Q 12 hour   - avoid NSAIDS   - No evidence of active bleed  - pt was consulted by GI, recommendations noted     # Iron deficiency anemia  - received IV Venofer while in the hospital   - no active bleeding noted   - monitor H/H, keep Hb above 7.5 and active type/cross        # H/o hypertension  - DASH diet   -on  Lisinopril     # Inability to walk/ spinal stenosis/ degenerative Dz in L-spine/ Left hip pain   - MRI findings noted , case d/w ortho attending  - s/p  arthrocentesis by IR   - pain control  - PT/rehab evaluation   - fall precautions   - OOB to chair     #Progress Note Handoff  IR for liver abscess drainage today  PTT for IV heparin when restarted   updated friend Dr Terrazas on number listed as emergency contact

## 2020-09-12 LAB
ALBUMIN SERPL ELPH-MCNC: 2.8 G/DL — LOW (ref 3.5–5.2)
ALP SERPL-CCNC: 141 U/L — HIGH (ref 30–115)
ALT FLD-CCNC: 46 U/L — HIGH (ref 0–41)
ANION GAP SERPL CALC-SCNC: 11 MMOL/L — SIGNIFICANT CHANGE UP (ref 7–14)
APTT BLD: 52.7 SEC — HIGH (ref 27–39.2)
APTT BLD: 63.8 SEC — HIGH (ref 27–39.2)
APTT BLD: 67.3 SEC — HIGH (ref 27–39.2)
AST SERPL-CCNC: 32 U/L — SIGNIFICANT CHANGE UP (ref 0–41)
BASOPHILS # BLD AUTO: 0.01 K/UL — SIGNIFICANT CHANGE UP (ref 0–0.2)
BASOPHILS NFR BLD AUTO: 0.1 % — SIGNIFICANT CHANGE UP (ref 0–1)
BILIRUB SERPL-MCNC: 0.3 MG/DL — SIGNIFICANT CHANGE UP (ref 0.2–1.2)
BUN SERPL-MCNC: 14 MG/DL — SIGNIFICANT CHANGE UP (ref 10–20)
CALCIUM SERPL-MCNC: 8.4 MG/DL — LOW (ref 8.5–10.1)
CHLORIDE SERPL-SCNC: 103 MMOL/L — SIGNIFICANT CHANGE UP (ref 98–110)
CO2 SERPL-SCNC: 23 MMOL/L — SIGNIFICANT CHANGE UP (ref 17–32)
CREAT SERPL-MCNC: 0.7 MG/DL — SIGNIFICANT CHANGE UP (ref 0.7–1.5)
EOSINOPHIL # BLD AUTO: 0.1 K/UL — SIGNIFICANT CHANGE UP (ref 0–0.7)
EOSINOPHIL NFR BLD AUTO: 1.3 % — SIGNIFICANT CHANGE UP (ref 0–8)
GLUCOSE SERPL-MCNC: 100 MG/DL — HIGH (ref 70–99)
HCT VFR BLD CALC: 25.9 % — LOW (ref 37–47)
HGB BLD-MCNC: 7.9 G/DL — LOW (ref 12–16)
IMM GRANULOCYTES NFR BLD AUTO: 1.1 % — HIGH (ref 0.1–0.3)
LYMPHOCYTES # BLD AUTO: 2.03 K/UL — SIGNIFICANT CHANGE UP (ref 1.2–3.4)
LYMPHOCYTES # BLD AUTO: 25.9 % — SIGNIFICANT CHANGE UP (ref 20.5–51.1)
MCHC RBC-ENTMCNC: 28.4 PG — SIGNIFICANT CHANGE UP (ref 27–31)
MCHC RBC-ENTMCNC: 30.5 G/DL — LOW (ref 32–37)
MCV RBC AUTO: 93.2 FL — SIGNIFICANT CHANGE UP (ref 81–99)
MONOCYTES # BLD AUTO: 0.47 K/UL — SIGNIFICANT CHANGE UP (ref 0.1–0.6)
MONOCYTES NFR BLD AUTO: 6 % — SIGNIFICANT CHANGE UP (ref 1.7–9.3)
NEUTROPHILS # BLD AUTO: 5.13 K/UL — SIGNIFICANT CHANGE UP (ref 1.4–6.5)
NEUTROPHILS NFR BLD AUTO: 65.6 % — SIGNIFICANT CHANGE UP (ref 42.2–75.2)
NIGHT BLUE STAIN TISS: SIGNIFICANT CHANGE UP
NRBC # BLD: 0 /100 WBCS — SIGNIFICANT CHANGE UP (ref 0–0)
PLATELET # BLD AUTO: 265 K/UL — SIGNIFICANT CHANGE UP (ref 130–400)
POTASSIUM SERPL-MCNC: 3.8 MMOL/L — SIGNIFICANT CHANGE UP (ref 3.5–5)
POTASSIUM SERPL-SCNC: 3.8 MMOL/L — SIGNIFICANT CHANGE UP (ref 3.5–5)
PROT SERPL-MCNC: 6 G/DL — SIGNIFICANT CHANGE UP (ref 6–8)
RBC # BLD: 2.78 M/UL — LOW (ref 4.2–5.4)
RBC # FLD: 13.2 % — SIGNIFICANT CHANGE UP (ref 11.5–14.5)
SODIUM SERPL-SCNC: 137 MMOL/L — SIGNIFICANT CHANGE UP (ref 135–146)
SPECIMEN SOURCE: SIGNIFICANT CHANGE UP
WBC # BLD: 7.83 K/UL — SIGNIFICANT CHANGE UP (ref 4.8–10.8)
WBC # FLD AUTO: 7.83 K/UL — SIGNIFICANT CHANGE UP (ref 4.8–10.8)

## 2020-09-12 PROCEDURE — 99233 SBSQ HOSP IP/OBS HIGH 50: CPT

## 2020-09-12 RX ORDER — OXYCODONE HYDROCHLORIDE 5 MG/1
5 TABLET ORAL THREE TIMES A DAY
Refills: 0 | Status: DISCONTINUED | OUTPATIENT
Start: 2020-09-12 | End: 2020-09-14

## 2020-09-12 RX ORDER — APIXABAN 2.5 MG/1
5 TABLET, FILM COATED ORAL EVERY 12 HOURS
Refills: 0 | Status: DISCONTINUED | OUTPATIENT
Start: 2020-09-12 | End: 2020-09-17

## 2020-09-12 RX ADMIN — Medication 650 MILLIGRAM(S): at 17:06

## 2020-09-12 RX ADMIN — Medication 100 MILLIGRAM(S): at 13:46

## 2020-09-12 RX ADMIN — Medication 100 MILLIGRAM(S): at 06:00

## 2020-09-12 RX ADMIN — CEFTRIAXONE 100 MILLIGRAM(S): 500 INJECTION, POWDER, FOR SOLUTION INTRAMUSCULAR; INTRAVENOUS at 10:35

## 2020-09-12 RX ADMIN — PANTOPRAZOLE SODIUM 40 MILLIGRAM(S): 20 TABLET, DELAYED RELEASE ORAL at 17:01

## 2020-09-12 RX ADMIN — Medication 650 MILLIGRAM(S): at 13:22

## 2020-09-12 RX ADMIN — CHLORHEXIDINE GLUCONATE 1 APPLICATION(S): 213 SOLUTION TOPICAL at 06:00

## 2020-09-12 RX ADMIN — PANTOPRAZOLE SODIUM 40 MILLIGRAM(S): 20 TABLET, DELAYED RELEASE ORAL at 06:00

## 2020-09-12 RX ADMIN — Medication 100 MILLIGRAM(S): at 21:05

## 2020-09-12 RX ADMIN — Medication 1 GRAM(S): at 17:01

## 2020-09-12 RX ADMIN — Medication 1 GRAM(S): at 06:00

## 2020-09-12 RX ADMIN — LISINOPRIL 5 MILLIGRAM(S): 2.5 TABLET ORAL at 06:00

## 2020-09-12 RX ADMIN — APIXABAN 5 MILLIGRAM(S): 2.5 TABLET, FILM COATED ORAL at 17:01

## 2020-09-12 RX ADMIN — Medication 50 MICROGRAM(S): at 06:00

## 2020-09-12 NOTE — PROGRESS NOTE ADULT - SUBJECTIVE AND OBJECTIVE BOX
IOANA POTTER  64y, Female  Allergy: No Known Allergies      LOS  13d    CHIEF COMPLAINT: pulmonary embolism (12 Sep 2020 13:22)      INTERVAL EVENTS/HPI  - T(F): , Max: 103 (09-11-20 @ 20:49), s/p IR drainage  - Denies any worsening symptoms  - Tolerating medication  - WBC Count: 7.83 (09-12-20 @ 05:41)  WBC Count: 6.22 (09-11-20 @ 06:52)  - Creatinine, Serum: 0.7 (09-12-20 @ 05:41)  Creatinine, Serum: 0.7 (09-11-20 @ 06:52)       ROS  General: Denies rigors, nightsweats  HEENT: Denies headache, rhinorrhea, sore throat, eye pain  CV: Denies CP, palpitations  PULM: Denies wheezing, hemoptysis  GI: Denies hematemesis, hematochezia, melena  : Denies discharge, hematuria  MSK: Denies arthralgias, myalgias  SKIN: Denies rash, lesions  NEURO: Denies paresthesias, weakness  PSYCH: Denies depression, anxiety    VITALS:  T(F): 100.8, Max: 103 (09-11-20 @ 20:49)  HR: 82  BP: 141/69  RR: 20Vital Signs Last 24 Hrs  T(C): 38.2 (12 Sep 2020 13:29), Max: 39.4 (11 Sep 2020 20:49)  T(F): 100.8 (12 Sep 2020 13:29), Max: 103 (11 Sep 2020 20:49)  HR: 82 (12 Sep 2020 13:29) (82 - 99)  BP: 141/69 (12 Sep 2020 13:28) (132/68 - 141/69)  BP(mean): --  RR: 20 (12 Sep 2020 13:28) (18 - 20)  SpO2: 94% (12 Sep 2020 13:29) (94% - 94%)    PHYSICAL EXAM:  Gen: NAD, resting in bed  HEENT: Normocephalic, atraumatic  Neck: supple, no lymphadenopathy  CV: Regular rate & regular rhythm  Lungs: decreased BS at bases, no fremitus  Abdomen: Soft, BS present SUHAS serosanguinous fluid   Ext: Warm, well perfused  Neuro: non focal, awake  Skin: no rash, no erythema  Lines: no phlebitis    FH: Non-contributory  Social Hx: Non-contributory    TESTS & MEASUREMENTS:                        7.9    7.83  )-----------( 265      ( 12 Sep 2020 05:41 )             25.9     09-12    137  |  103  |  14  ----------------------------<  100<H>  3.8   |  23  |  0.7    Ca    8.4<L>      12 Sep 2020 05:41  Mg     2.0     09-11    TPro  6.0  /  Alb  2.8<L>  /  TBili  0.3  /  DBili  x   /  AST  32  /  ALT  46<H>  /  AlkPhos  141<H>  09-12    eGFR if Non African American: 90 mL/min/1.73M2 (09-12-20 @ 05:41)  eGFR if African American: 105 mL/min/1.73M2 (09-12-20 @ 05:41)    LIVER FUNCTIONS - ( 12 Sep 2020 05:41 )  Alb: 2.8 g/dL / Pro: 6.0 g/dL / ALK PHOS: 141 U/L / ALT: 46 U/L / AST: 32 U/L / GGT: x               Culture - Acid Fast - Other w/Smear (collected 09-10-20 @ 16:20)  Source: .Other Other, Liver Abscess    Culture - Blood (collected 09-10-20 @ 11:32)  Source: .Blood None  Preliminary Report (09-11-20 @ 22:01):    No growth to date.    Culture - Blood (collected 09-06-20 @ 06:53)  Source: .Blood None  Final Report (09-11-20 @ 18:00):    No Growth Final    Culture - Blood (collected 09-05-20 @ 11:42)  Source: .Blood None  Final Report (09-10-20 @ 19:00):    No Growth Final    Culture - Body Fluid with Gram Stain (collected 09-04-20 @ 10:30)  Source: .Body Fluid Joint Fluid, Left Hip  Gram Stain (09-04-20 @ 22:29):    No polymorphonuclear cells seen    No organisms seen  Preliminary Report (09-05-20 @ 17:11):    No growth    Culture - Blood (collected 09-03-20 @ 20:52)  Source: .Blood None  Final Report (09-09-20 @ 04:01):    No Growth Final    Culture - Urine (collected 09-03-20 @ 18:00)  Source: .Urine Catheterized  Final Report (09-05-20 @ 20:47):    <10,000 CFU/mL Normal Urogenital Julianne    Culture - Urine (collected 09-03-20 @ 01:30)  Source: .Urine Clean Catch (Midstream)  Final Report (09-05-20 @ 18:36):    >100,000 CFU/ml Escherichia coli  Organism: Escherichia coli (09-05-20 @ 18:36)  Organism: Escherichia coli (09-05-20 @ 18:36)      -  Amikacin: S <=16      -  Amoxicillin/Clavulanic Acid: S <=8/4      -  Ampicillin: R >16 These ampicillin results predict results for amoxicillin      -  Ampicillin/Sulbactam: I 16/8 Enterobacter, Citrobacter, and Serratia may develop resistance during prolonged therapy (3-4 days)      -  Aztreonam: S <=4      -  Cefazolin: S <=2 (MIC_CL_COM_ENTERIC_CEFAZU) For uncomplicated UTI with K. pneumoniae, E. coli, or P. mirablis: HAKAN <=16 is sensitive and HAKAN >=32 is resistant. This also predicts results for oral agents cefaclor, cefdinir, cefpodoxime, cefprozil, cefuroxime axetil, cephalexin and locarbef for uncomplicated UTI. Note that some isolates may be susceptible to these agents while testing resistant to cefazolin.      -  Cefepime: S <=2      -  Cefoxitin: S <=8      -  Ceftriaxone: S <=1 Enterobacter, Citrobacter, and Serratia may develop resistance during prolonged therapy      -  Ciprofloxacin: S <=0.25      -  Ertapenem: S <=0.5      -  Gentamicin: S <=2      -  Imipenem: S <=1      -  Levofloxacin: S <=0.5      -  Meropenem: S <=1      -  Nitrofurantoin: S <=32 Should not be used to treat pyelonephritis      -  Piperacillin/Tazobactam: S <=8      -  Tigecycline: S <=2      -  Tobramycin: S <=2      -  Trimethoprim/Sulfamethoxazole: R >2/38      Method Type: HAKAN            INFECTIOUS DISEASES TESTING  Procalcitonin, Serum: 0.23 (09-09-20 @ 06:18)  COVID-19 PCR: NotDetec (09-08-20 @ 10:02)  MRSA PCR Result.: Negative (09-05-20 @ 17:00)  COVID-19 PCR: NotDetec (08-30-20 @ 16:49)      INFLAMMATORY MARKERS      RADIOLOGY & ADDITIONAL TESTS:  I have personally reviewed the last available Chest xray  CXR      CT      CARDIOLOGY TESTING  12 Lead ECG:   Ventricular Rate 91 BPM    Atrial Rate 91 BPM    P-R Interval 128 ms    QRS Duration 72 ms    Q-T Interval 348 ms    QTC Calculation(Bezet) 428 ms    P Axis 40 degrees    R Axis -23 degrees    T Axis 11 degrees    Diagnosis Line Normal sinus rhythm  Possible Left atrial enlargement  Low voltage QRS  Possible Lateral infarct , age undetermined  Abnormal ECG    Confirmed by ALDA KRAMER MD ([id number]) on 9/7/2020 11:16:38 PM (09-05-20 @ 13:42)  Transthoracic Echocardiogram:    EXAM:  2-D ECHO (TTE) COMPLETE        PROCEDURE DATE:  08/31/2020      INTERPRETATION:  REPORT:  TRANSTHORACIC ECHOCARDIOGRAM REPORT        Patient Name:   IOANA POTTER Accession #: [id number]  Medical Rec #:  [id number]         Height:      63.0 in 160.0 cm  YOB: 1955        Weight:      185.0 lb 83.91 kg  Patient Age:    64 years          BSA:         1.87 m²  Patient Gender: F                 BP:          116/66 mmHg      Date of Exam:        8/31/2020 1:18:07 PM  Referring Physician: [id number] IRINA NAILS  Sonographer:         Felicia Griffin  Reading Physician:   Livier Colvin M.D.    Procedure:   2D Echo/Doppler/Color Doppler Complete.  Indications: R07.9 - Chest Pain, unspecified  Diagnosis:   Chest pain, unspecified - R07.9        Summary:   1. Normal global left ventricular systolic function.   2. LV Ejection Fraction by Hardin's Method with a biplane EF of 59 %.   3. Mild left ventricular hypertrophy.   4. Mildly increased LV wall thickness.   5. Spectral Doppler shows impaired relaxation pattern of left ventricular myocardial filling (Grade I diastolic dysfunction).   6. Normal left atrial size.   7. Normal right atrial size.   8. Structurally normal mitral valve, with normal leaflet excursion.   9. Trace mitral valve regurgitation.  10. Mild-moderate tricuspid regurgitation.  11. Sclerotic aortic valve with normal opening.  12. Mild pulmonic valve regurgitation.  13. Estimated pulmonary artery systolic pressure is 47.5 mmHg assuming a right atrial pressure of 5 mmHg, which is consistent with mild pulmonary hypertension.  14. LA volume Index is 14.6 ml/m² ml/m2.    PHYSICIAN INTERPRETATION:  Left Ventricle: The left ventricular internal cavity size is normal. Left ventricular wall thicknessis mildly increased. There is mild left ventricular hypertrophy. Global LV systolic function was normal. Spectral Doppler shows impaired relaxation pattern of left ventricular myocardial filling (Grade I diastolic dysfunction). Normal LV filling pressures.  Right Ventricle: Normal right ventricular size and function.  Left Atrium: Normal left atrial size. LA volume Index is 14.6 ml/m² ml/m2.  Right Atrium: Normal right atrial size.  Pericardium: There is no evidence of pericardial effusion.  Mitral Valve: Structurally normal mitral valve, with normal leaflet excursion. Trace mitral valve regurgitation is seen.  Tricuspid Valve: Structurally normal tricuspid valve, with normal leaflet excursion. Mild-moderate tricuspid regurgitation is visualized. Estimated pulmonary artery systolic pressure is 47.5 mmHg assuming a right atrial pressure of 5 mmHg, which is consistent with mild pulmonary hypertension.  Aortic Valve: The aortic valve is trileaflet. Sclerotic aortic valve with normal opening.No evidence of aortic valve regurgitation is seen.  Pulmonic Valve: Structurally normal pulmonic valve, with normal leaflet excursion. Mild pulmonic valve regurgitation.  Aorta: The aortic root and ascending aorta are structurally normal, with no evidence of dilitation.  Pulmonary Artery: The main pulmonary artery is normal in size.  Venous: The inferior vena cava was normal sized, with respiratory size variation greater than 50%.      2D AND M-MODE MEASUREMENTS (normal ranges within parentheses):  Left                  Normal   Aorta/Left             Normal  Ventricle:                     Atrium:  IVSd (2D):  1.13 cm  (0.7-1.1) AoV Cusp       1.76  (1.5-2.6)  LVPWd (2D): 0.72 cm  (0.7-1.1) Separation:     cm  LVIDd (2D): 3.98 cm  (3.4-5.7) Left Atrium    2.70  (1.9-4.0)  LVIDs (2D): 2.75 cm            (Mmode):        cm  LV FS (2D):  30.9 %   (>25%)   LA Volume      14.5  Relative      0.36    (<0.42)  Index         ml/m²  Wall                           Right  Thickness   Ventricle:                                 RVd (2D):      3.19 cm    SPECTRAL DOPPLER ANALYSIS:  LV DIASTOLIC FUNCTION:  MV Peak E: 0.49 m/s Decel Time: 270 msec  MV Peak A: 0.90 m/s  E/A Ratio: 0.54    LVOT Vmax: 1.31 m/s  LVOT VTI:  0.18 m  LVOTDiam: 1.77 cm    Mitral Valve:  MV P1/2 Time: 78.40 msec  MV Area, PHT: 2.81 cm²    Tricuspid Valve and PA/RV Systolic Pressure: TR Max Velocity: 3.26 m/s RA Pressure: 5 mmHg RVSP/PASP: 47.5 mmHg      [id number] Livier Colvin M.D., Electronically signedon 8/31/2020 at 4:35:15 PM            *** Final ***              LIVIER COLVIN M.D., RESIDENT RADIOLOGIST  This document has been electronically signed.  LIVIER COLVIN M.D., RESIDENT RADIOLOGIST  This document has been electronically signed. Aug [id number]  1:18PM             (08-31-20 @ 13:18)      MEDICATIONS  apixaban 5 Oral every 12 hours  cefTRIAXone   IVPB 2000 IV Intermittent every 24 hours  chlorhexidine 4% Liquid 1 Topical <User Schedule>  levothyroxine 50 Oral daily  lisinopril 5 Oral daily  metroNIDAZOLE  IVPB 500 IV Intermittent every 8 hours  pantoprazole   Suspension 40 Oral two times a day  sodium chloride 0.9%. 1000 IV Continuous <Continuous>  sucralfate 1 Oral every 12 hours      WEIGHT  Weight (kg): 87.3 (09-01-20 @ 15:49)  Creatinine, Serum: 0.7 mg/dL (09-12-20 @ 05:41)      ANTIBIOTICS:  cefTRIAXone   IVPB 2000 milliGRAM(s) IV Intermittent every 24 hours  metroNIDAZOLE  IVPB 500 milliGRAM(s) IV Intermittent every 8 hours      All available historical records have been reviewed

## 2020-09-12 NOTE — PROGRESS NOTE ADULT - SUBJECTIVE AND OBJECTIVE BOX
64 year-old female with a PMH of PUD, HTN and hypothyroidism and a PSH of appendectomy and tonsillectomy presenting for shortness of breath, weakness and dizziness. The patient was found to have submassive pulmonary embolism and underwent thrombectomy on 8/31/20 with IR. The patient was anticoagulated with Heparin ggt. She was followed by GI for PUD.  Pt was downgraded to telemetry from ICU on 9/1/20. While in the hospital pt was diagnosed with iron deficiency amenia.   MRI of the left hip showed significant joint effusion, case discussed with ortho attending, IR consulted , pt underwent arthrocentesis. .  While in the hospital pt is spiking fever, and LFts elevated - found to have liver abscess on CT abdomen - sp IR drainage - 20 cc purulent material - pigtail left inplace draining serosanguinous now +abd pain     PAST MEDICAL & SURGICAL HISTORY  HTN (hypertension)  Gastric ulcer  S/P tonsillectomy  S/P appendectomy      ALLERGIES:  No Known Allergies  Vital Signs Last 24 Hrs  T(C): 37.1 (12 Sep 2020 05:15), Max: 39.4 (11 Sep 2020 20:49)  T(F): 98.8 (12 Sep 2020 05:15), Max: 103 (11 Sep 2020 20:49)  HR: 83 (12 Sep 2020 05:15) (83 - 99)  BP: 132/68 (12 Sep 2020 05:15) (132/68 - 140/82)  BP(mean): --  RR: 20 (12 Sep 2020 05:15) (18 - 20)  SpO2: 94% (11 Sep 2020 20:24) (94% - 94%)    PHYSICAL EXAM:  GEN: No acute distress  HEENT: normocephalic, atraumatic  LUNGS: decreased breath sounds at bases   HEART: S1/S2 present. RRR, no murmurs  ABD: ruq, epigastric tenderness +pigtail cath with stacie bulb draining serosang fluid  EXT: NC/NC/NE/2+PP/FRANCES, decreased ROM in LLE due to pain   NEURO: AAOX3, normal affect, no focal neuro deficit       LABS:                                                7.9    7.83  )-----------( 265      ( 12 Sep 2020 05:41 )             25.9     09-12    137  |  103  |  14  ----------------------------<  100<H>  3.8   |  23  |  0.7    Ca    8.4<L>      12 Sep 2020 05:41  Mg     2.0     09-11    TPro  6.0  /  Alb  2.8<L>  /  TBili  0.3  /  DBili  x   /  AST  32  /  ALT  46<H>  /  AlkPhos  141<H>  09-12      Culture - Body Fluid with Gram Stain (09.04.20 @ 10:30)    Gram Stain:   No polymorphonuclear cells seen  No organisms seen    Specimen Source: .Body Fluid Joint Fluid, Left Hip    Culture - Blood (09.03.20 @ 20:52)    Specimen Source: .Blood None    Culture Results:   No growth to date.    Culture - Urine (09.03.20 @ 01:30)    Specimen Source: .Urine Clean Catch (Midstream)    Culture Results:   >100,000 CFU/ml Escherichia coli      RADIOLOGY:  < from: [facility] Duplex Lower Ext Vein Scan, Bilat (08.31.20 @ 17:39) >  Impression:    No evidence of deep venous thrombosis or superficial thrombophlebitis in the right lower extremity.    Acute left popliteal, posterior tibial peroneal vein DVT    < end of copied text >    < from: IR Procedure (08.31.20 @ 12:48) >  FINDINGS:    1. Preliminary ultrasound demonstrates patent right common femoral vein.    2. Right pulmonary artery angiogram: Extensive filling defects within the right main pulmonary artery extending into the upper and lower lobes.    3. Left pulmonary artery angiogram: Small filling defects within the left lower lobe pulmonary arterial segmental branches with oligemia.    4. Successful mechanical suction thrombectomy in right main pulmonary artery as well as left lower lobe segmental pulmonary artery utilizing Inari suction thrombectomy catheter.    < end of copied text >  < from: Transthoracic Echocardiogram (08.31.20 @ 13:18) >  Summary:   1. Normal global left ventricular systolic function.   2. LV Ejection Fraction by Hardin's Method with a biplane EF of 59 %.   3. Mild left ventricular hypertrophy.   4. Mildly increased LV wall thickness.   5. Spectral Doppler shows impaired relaxation pattern of left ventricular myocardial filling (Grade I diastolic dysfunction).   6. Normal left atrial size.   7. Normal right atrial size.   8. Structurally normal mitral valve, with normal leaflet excursion.   9. Trace mitral valve regurgitation.  10. Mild-moderate tricuspid regurgitation.  11. Sclerotic aortic valve with normal opening.  12. Mild pulmonic valve regurgitation.  13. Estimated pulmonary artery systolic pressure is 47.5 mmHg assuming a right atrial pressure of 5 mmHg, which is consistent with mild pulmonary hypertension.  14. LA volume Index is 14.6 ml/m² ml/m2.    < from: MR Hip w/wo IV Cont, Left (09.02.20 @ 19:42) >  IMPRESSION:    1. 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 Associated stress related sclerosis/edema in the left femoral neck and left acetabulum. Extensive surrounding edema/inflammation. Given these findings, septic arthritis cannot be excluded.  2. Large heterogeneous joint effusion may be due to internal hemorrhage.  3. Apparent tendon tears as above.    < end of copied text >    S/p image guided left hip aspiration, obtaining approx 5cc thick purulent fluid using a 5Fr [employer] needle. Specimen given to primary team resident.    < from: CT Abdomen and Pelvis w/ IV Cont (09.08.20 @ 22:42) >  IMPRESSION:    1.  6.1 x 4.9 x 6.0 cm multiloculated hypoattenuating structure in the left hepatic lobe, compatible with an abscess in the appropriate clinical setting. Mild associated left intrahepatic biliary ductal dilatation. Recommend imaging follow up after treatment to assess for resolution.  2.  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      < end of copied text >    MEDICATIONS  (STANDING):  cefTRIAXone   IVPB 2000 milliGRAM(s) IV Intermittent every 24 hours  chlorhexidine 4% Liquid 1 Application(s) Topical <User Schedule>  levothyroxine 50 MICROGram(s) Oral daily  lisinopril 5 milliGRAM(s) Oral daily  magnesium oxide 400 milliGRAM(s) Oral three times a day with meals  metroNIDAZOLE  IVPB 500 milliGRAM(s) IV Intermittent every 8 hours  pantoprazole   Suspension 40 milliGRAM(s) Oral two times a day  polyethylene glycol 3350 17 Gram(s) Oral daily  sodium chloride 0.9%. 1000 milliLiter(s) (75 mL/Hr) IV Continuous <Continuous>  sucralfate 1 Gram(s) Oral every 12 hours    MEDICATIONS  (PRN):  acetaminophen  Suppository .. 650 milliGRAM(s) Rectal every 6 hours PRN Temp greater or equal to 38C (100.4F)  bisacodyl 5 milliGRAM(s) Oral every 12 hours PRN Constipation  lactulose Syrup 10 Gram(s) Oral two times a day PRN constipation  lidocaine   Patch 1 Patch Transdermal daily PRN chest pain

## 2020-09-12 NOTE — PROGRESS NOTE ADULT - ASSESSMENT
ASSESSMENT AND PLAN:    The patient is a 64 year-old female with a PMH of HTN and hypothyroidism and a PSH of appendectomy and tonsillectomy presenting for shortness of breath, admitted for pulmonary embolism.    # Liver abscess  on CT  - restarted on rocephin and flagyl   - discussed with Id Dr marshall  - sp Ir jaycob and pigtail cath left inplace - monitor outpt   - change iv heparin back to eliquis   - unclear etiology of liver abscess - follow up on culture and sensitivities   - no further diarrhea   - check cultures and cytology     # transaminitis   - possibly combined due to liver abscess and medication induced - improved   LIVER FUNCTIONS - ( 12 Sep 2020 05:41 )  Alb: 2.8 g/dL / Pro: 6.0 g/dL / ALK PHOS: 141 U/L / ALT: 46 U/L / AST: 32 U/L / GGT: x             # Acute respiratory failure due to  submassive acute pulmonary embolism  - resolved   - s/p thrombectomy by  IR on 8/31  - pt is off oxygen and comfortable at rest now on RA  -dc iv heparin and restart eliquis  -  Lidoderm patch for pleuritic chest pain     # Acute diastolic CHF/ PHTN  - resolved   - c/w  Fluid restriction 1500 ml in 24 hours, daily weight  - low sodium diet, intake and output monitoring   - pt is clinically improving after thrombectomy   - troponin WNL   - monitor for fluid overload       #  PUD Gastric ulcer at pylorus/ Gastritis   - pt has recent EGD , report is in the paper chart  - biopsy was negative for H.Pylori / dysplasia    - Protonix 40 mg BID    - Carafate 1 gm Q 12 hour   - avoid NSAIDS   - No evidence of active bleed  - pt was consulted by GI, recommendations noted     # Iron deficiency anemia  - received IV Venofer while in the hospital   - no active bleeding noted   - monitor H/H, keep Hb above 7.5 and active type/cross        # H/o hypertension  - DASH diet   -on  Lisinopril     # Inability to walk/ spinal stenosis/ degenerative Dz in L-spine/ Left hip pain   - MRI findings noted , case d/w ortho attending  - s/p  arthrocentesis by IR   - pain control  - PT/rehab evaluation   - fall precautions   - OOB to chair     #Progress Note Handoff  liver abscess cultures  abx plan   updated friend Dr Terrazas on number listed as emergency contact

## 2020-09-12 NOTE — PROGRESS NOTE ADULT - ASSESSMENT
ASSESSMENT  64yFemale with a PMH of hypertension, peptic ulcer disease, hypothyroidism admitted to hospital for submassive PE s/p thrombectomy of 4 clots. Patient complaining of left hip pain, underwent MRI revealing extensive necrosis of femoral head and acetabulum and joint effusion, now s/p image guided arthrocentesis by IR revealing purulent fluid high in RBCs w moderate WBCs. Patient started on ceftriaxone 2000mg IV q24hr. WBC trending down since admission    IMPRESSION  # Liver abscess    s/p 9/11 Successful placement of a left hepatic lobe abscess 8.5 Georgian pigtail catheter. Approximately 20 ml of purulent fluid aspirated  < from: CT Abdomen and Pelvis w/ IV Cont (09.08.20 @ 22:42) >  6.1 x 4.9 x 6.0 cm multiloculated hypoattenuating structure in the left hepatic lobe, compatible with an abscess in the appropriate clinical setting.   #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 with surrounding edema/inflammation with extensive joint effusion  # Low suspicion for septic arthritis s/p image guided arthrocentesis by IR revealing 5cc "purulent" fluid with high level of RBCs and moderate WBCs  - s/p IR guided drainage: [id number] RBCs and 3351 nucleated cells (83% neutrophils)  - Blood Cx 9/3 NG, 9/5 BCX NGTD   - Hip Cx 9/4 NGTD  - Quantiferon NEGATIVE   #PE     RECOMMENDATIONS  - f/u IR cx  - On ceftriaxone/flagyl, if continues to spike, broaden to Dawn 1g q8h IV   - Ideally would need PICC line and 4-6 weeks IV abx, however pt is supposed to return to [facility]    Please call with any questions or send a message on [employer] Teams  Spectra [phone]

## 2020-09-13 LAB
ALBUMIN SERPL ELPH-MCNC: 2.7 G/DL — LOW (ref 3.5–5.2)
ALP SERPL-CCNC: 123 U/L — HIGH (ref 30–115)
ALT FLD-CCNC: 46 U/L — HIGH (ref 0–41)
ANION GAP SERPL CALC-SCNC: 10 MMOL/L — SIGNIFICANT CHANGE UP (ref 7–14)
AST SERPL-CCNC: 70 U/L — HIGH (ref 0–41)
BASOPHILS # BLD AUTO: 0.02 K/UL — SIGNIFICANT CHANGE UP (ref 0–0.2)
BASOPHILS NFR BLD AUTO: 0.2 % — SIGNIFICANT CHANGE UP (ref 0–1)
BILIRUB SERPL-MCNC: 0.3 MG/DL — SIGNIFICANT CHANGE UP (ref 0.2–1.2)
BUN SERPL-MCNC: 11 MG/DL — SIGNIFICANT CHANGE UP (ref 10–20)
CALCIUM SERPL-MCNC: 7.8 MG/DL — LOW (ref 8.5–10.1)
CHLORIDE SERPL-SCNC: 102 MMOL/L — SIGNIFICANT CHANGE UP (ref 98–110)
CO2 SERPL-SCNC: 23 MMOL/L — SIGNIFICANT CHANGE UP (ref 17–32)
CREAT SERPL-MCNC: 0.8 MG/DL — SIGNIFICANT CHANGE UP (ref 0.7–1.5)
EOSINOPHIL # BLD AUTO: 0.12 K/UL — SIGNIFICANT CHANGE UP (ref 0–0.7)
EOSINOPHIL NFR BLD AUTO: 1.1 % — SIGNIFICANT CHANGE UP (ref 0–8)
GLUCOSE SERPL-MCNC: 90 MG/DL — SIGNIFICANT CHANGE UP (ref 70–99)
HCT VFR BLD CALC: 25.7 % — LOW (ref 37–47)
HGB BLD-MCNC: 8 G/DL — LOW (ref 12–16)
IMM GRANULOCYTES NFR BLD AUTO: 0.6 % — HIGH (ref 0.1–0.3)
LYMPHOCYTES # BLD AUTO: 1.71 K/UL — SIGNIFICANT CHANGE UP (ref 1.2–3.4)
LYMPHOCYTES # BLD AUTO: 15.4 % — LOW (ref 20.5–51.1)
MCHC RBC-ENTMCNC: 29.1 PG — SIGNIFICANT CHANGE UP (ref 27–31)
MCHC RBC-ENTMCNC: 31.1 G/DL — LOW (ref 32–37)
MCV RBC AUTO: 93.5 FL — SIGNIFICANT CHANGE UP (ref 81–99)
MONOCYTES # BLD AUTO: 0.67 K/UL — HIGH (ref 0.1–0.6)
MONOCYTES NFR BLD AUTO: 6.1 % — SIGNIFICANT CHANGE UP (ref 1.7–9.3)
NEUTROPHILS # BLD AUTO: 8.48 K/UL — HIGH (ref 1.4–6.5)
NEUTROPHILS NFR BLD AUTO: 76.6 % — HIGH (ref 42.2–75.2)
NRBC # BLD: 0 /100 WBCS — SIGNIFICANT CHANGE UP (ref 0–0)
PLATELET # BLD AUTO: 327 K/UL — SIGNIFICANT CHANGE UP (ref 130–400)
POTASSIUM SERPL-MCNC: 4.3 MMOL/L — SIGNIFICANT CHANGE UP (ref 3.5–5)
POTASSIUM SERPL-SCNC: 4.3 MMOL/L — SIGNIFICANT CHANGE UP (ref 3.5–5)
PROT SERPL-MCNC: 5.5 G/DL — LOW (ref 6–8)
RBC # BLD: 2.75 M/UL — LOW (ref 4.2–5.4)
RBC # FLD: 13.2 % — SIGNIFICANT CHANGE UP (ref 11.5–14.5)
SODIUM SERPL-SCNC: 135 MMOL/L — SIGNIFICANT CHANGE UP (ref 135–146)
WBC # BLD: 11.07 K/UL — HIGH (ref 4.8–10.8)
WBC # FLD AUTO: 11.07 K/UL — HIGH (ref 4.8–10.8)

## 2020-09-13 PROCEDURE — 99233 SBSQ HOSP IP/OBS HIGH 50: CPT

## 2020-09-13 RX ADMIN — Medication 50 MICROGRAM(S): at 05:55

## 2020-09-13 RX ADMIN — LISINOPRIL 5 MILLIGRAM(S): 2.5 TABLET ORAL at 05:55

## 2020-09-13 RX ADMIN — APIXABAN 5 MILLIGRAM(S): 2.5 TABLET, FILM COATED ORAL at 05:55

## 2020-09-13 RX ADMIN — APIXABAN 5 MILLIGRAM(S): 2.5 TABLET, FILM COATED ORAL at 17:04

## 2020-09-13 RX ADMIN — Medication 100 MILLIGRAM(S): at 14:36

## 2020-09-13 RX ADMIN — PANTOPRAZOLE SODIUM 40 MILLIGRAM(S): 20 TABLET, DELAYED RELEASE ORAL at 17:04

## 2020-09-13 RX ADMIN — OXYCODONE HYDROCHLORIDE 5 MILLIGRAM(S): 5 TABLET ORAL at 14:36

## 2020-09-13 RX ADMIN — OXYCODONE HYDROCHLORIDE 5 MILLIGRAM(S): 5 TABLET ORAL at 15:00

## 2020-09-13 RX ADMIN — Medication 1 GRAM(S): at 17:04

## 2020-09-13 RX ADMIN — CEFTRIAXONE 100 MILLIGRAM(S): 500 INJECTION, POWDER, FOR SOLUTION INTRAMUSCULAR; INTRAVENOUS at 10:39

## 2020-09-13 RX ADMIN — Medication 1 GRAM(S): at 05:55

## 2020-09-13 RX ADMIN — CHLORHEXIDINE GLUCONATE 1 APPLICATION(S): 213 SOLUTION TOPICAL at 05:57

## 2020-09-13 RX ADMIN — OXYCODONE HYDROCHLORIDE 5 MILLIGRAM(S): 5 TABLET ORAL at 05:53

## 2020-09-13 RX ADMIN — PANTOPRAZOLE SODIUM 40 MILLIGRAM(S): 20 TABLET, DELAYED RELEASE ORAL at 05:58

## 2020-09-13 RX ADMIN — Medication 100 MILLIGRAM(S): at 05:57

## 2020-09-13 RX ADMIN — Medication 100 MILLIGRAM(S): at 21:11

## 2020-09-13 NOTE — PROGRESS NOTE ADULT - SUBJECTIVE AND OBJECTIVE BOX
64 year-old female with a PMH of PUD, HTN and hypothyroidism and a PSH of appendectomy and tonsillectomy presenting for shortness of breath, weakness and dizziness. The patient was found to have submassive pulmonary embolism and underwent thrombectomy on 8/31/20 with IR. The patient was anticoagulated with Heparin ggt. She was followed by GI for PUD.  Pt was downgraded to telemetry from ICU on 9/1/20. While in the hospital pt was diagnosed with iron deficiency amenia.   MRI of the left hip showed significant joint effusion, case discussed with ortho attending, IR consulted , pt underwent arthrocentesis. .  While in the hospital pt is spiking fever, and LFts elevated - found to have liver abscess on CT abdomen - sp IR drainage - 20 cc purulent material - pigtail left inplace draining serosanguinous now +abd pain     PAST MEDICAL & SURGICAL HISTORY  HTN (hypertension)  Gastric ulcer  S/P tonsillectomy  S/P appendectomy      ALLERGIES:  No Known Allergies    Vital Signs Last 24 Hrs  T(C): 37.1 (13 Sep 2020 06:37), Max: 38.2 (12 Sep 2020 13:28)  T(F): 98.8 (13 Sep 2020 06:37), Max: 100.8 (12 Sep 2020 13:28)  HR: 97 (13 Sep 2020 04:51) (82 - 97)  BP: 123/78 (13 Sep 2020 04:51) (123/78 - 141/69)  BP(mean): --  RR: 18 (13 Sep 2020 04:51) (18 - 20)  SpO2: 95% (13 Sep 2020 00:04) (94% - 95%)      PHYSICAL EXAM:  GEN: No acute distress  HEENT: normocephalic, atraumatic  LUNGS: decreased breath sounds at bases   HEART: S1/S2 present. RRR, no murmurs  ABD: ruq, mild tenderness +pigtail cath with stacie bulb draining serosang fluid  EXT: NC/NC/NE/2+PP/FRANCES, decreased ROM in LLE due to pain   NEURO: AAOX3, normal affect, no focal neuro deficit       LABS:                                                               8.0    11.07 )-----------( 327      ( 13 Sep 2020 08:03 )             25.7     09-13    135  |  102  |  11  ----------------------------<  90  4.3   |  23  |  0.8    Ca    7.8<L>      13 Sep 2020 08:03    TPro  5.5<L>  /  Alb  2.7<L>  /  TBili  0.3  /  DBili  x   /  AST  70<H>  /  ALT  46<H>  /  AlkPhos  123<H>  09-13    Culture - Acid Fast - Other w/Smear (collected 10 Sep 2020 16:20)  Source: .Other Other, Liver Abscess    Culture - Abscess with Gram Stain (collected 10 Sep 2020 16:20)  Source: .Abscess Liver Abscess  Preliminary Report (12 Sep 2020 19:05):    No growth    Culture - Blood (collected 10 Sep 2020 11:32)  Source: .Blood None  Preliminary Report (11 Sep 2020 22:01):    No growth to date.        RADIOLOGY:  < from: [facility] Duplex Lower Ext Vein Scan, Bilat (08.31.20 @ 17:39) >  Impression:    No evidence of deep venous thrombosis or superficial thrombophlebitis in the right lower extremity.    Acute left popliteal, posterior tibial peroneal vein DVT    < end of copied text >    < from: IR Procedure (08.31.20 @ 12:48) >  FINDINGS:    1. Preliminary ultrasound demonstrates patent right common femoral vein.    2. Right pulmonary artery angiogram: Extensive filling defects within the right main pulmonary artery extending into the upper and lower lobes.    3. Left pulmonary artery angiogram: Small filling defects within the left lower lobe pulmonary arterial segmental branches with oligemia.    4. Successful mechanical suction thrombectomy in right main pulmonary artery as well as left lower lobe segmental pulmonary artery utilizing Inari suction thrombectomy catheter.    < end of copied text >  < from: Transthoracic Echocardiogram (08.31.20 @ 13:18) >  Summary:   1. Normal global left ventricular systolic function.   2. LV Ejection Fraction by Hardin's Method with a biplane EF of 59 %.   3. Mild left ventricular hypertrophy.   4. Mildly increased LV wall thickness.   5. Spectral Doppler shows impaired relaxation pattern of left ventricular myocardial filling (Grade I diastolic dysfunction).   6. Normal left atrial size.   7. Normal right atrial size.   8. Structurally normal mitral valve, with normal leaflet excursion.   9. Trace mitral valve regurgitation.  10. Mild-moderate tricuspid regurgitation.  11. Sclerotic aortic valve with normal opening.  12. Mild pulmonic valve regurgitation.  13. Estimated pulmonary artery systolic pressure is 47.5 mmHg assuming a right atrial pressure of 5 mmHg, which is consistent with mild pulmonary hypertension.  14. LA volume Index is 14.6 ml/m² ml/m2.    < from: MR Hip w/wo IV Cont, Left (09.02.20 @ 19:42) >  IMPRESSION:    1. 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 Associated stress related sclerosis/edema in the left femoral neck and left acetabulum. Extensive surrounding edema/inflammation. Given these findings, septic arthritis cannot be excluded.  2. Large heterogeneous joint effusion may be due to internal hemorrhage.  3. Apparent tendon tears as above.    < end of copied text >    S/p image guided left hip aspiration, obtaining approx 5cc thick purulent fluid using a 5Fr [employer] needle. Specimen given to primary team resident.    < from: CT Abdomen and Pelvis w/ IV Cont (09.08.20 @ 22:42) >  IMPRESSION:    1.  6.1 x 4.9 x 6.0 cm multiloculated hypoattenuating structure in the left hepatic lobe, compatible with an abscess in the appropriate clinical setting. Mild associated left intrahepatic biliary ductal dilatation. Recommend imaging follow up after treatment to assess for resolution.  2.  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      < end of copied text >    MEDICATIONS  (STANDING):  cefTRIAXone   IVPB 2000 milliGRAM(s) IV Intermittent every 24 hours  chlorhexidine 4% Liquid 1 Application(s) Topical <User Schedule>  levothyroxine 50 MICROGram(s) Oral daily  lisinopril 5 milliGRAM(s) Oral daily  magnesium oxide 400 milliGRAM(s) Oral three times a day with meals  metroNIDAZOLE  IVPB 500 milliGRAM(s) IV Intermittent every 8 hours  pantoprazole   Suspension 40 milliGRAM(s) Oral two times a day  polyethylene glycol 3350 17 Gram(s) Oral daily  sodium chloride 0.9%. 1000 milliLiter(s) (75 mL/Hr) IV Continuous <Continuous>  sucralfate 1 Gram(s) Oral every 12 hours    MEDICATIONS  (PRN):  acetaminophen  Suppository .. 650 milliGRAM(s) Rectal every 6 hours PRN Temp greater or equal to 38C (100.4F)  bisacodyl 5 milliGRAM(s) Oral every 12 hours PRN Constipation  lactulose Syrup 10 Gram(s) Oral two times a day PRN constipation  lidocaine   Patch 1 Patch Transdermal daily PRN chest pain

## 2020-09-13 NOTE — CHART NOTE - NSCHARTNOTEFT_GEN_A_CORE
Registered Dietitian Follow-Up     Patient Profile Reviewed                           Yes [x]   No []     Nutrition History Previously Obtained        Yes [x]  No []       Pertinent Subjective Information: The patient reports consuming >75% of meals.      Pertinent Medical Interventions: Admitted for pulmonary embolism, thrombectomy on () with IR. Acute diastolic CHF (resolved). PUD Gastric ulcer at pylorus/ Gastritis - seen by GI. Inability to walk/ spinal stenosis/ degenerative dz - Ortho and PT/rehab on board. MRI of the left hip showed significant joint effusion, case discussed with ortho attending, IR consulted , pt underwent arthrocentesis. Developed a fever and elevated LFTs. Found to have liver abscess on CT abdomen S/p IR drainage with 20mL purulent material, s/p pigtail left in place draining serosanguinous now +abd pain.         Diet order: () DASH/TLC with 1.5 Liter fluid restriction      Anthropometrics:  - Ht: 5'3"  - Wt: 87.3kg  - %wt change  - BMI: 34 (Class I obesity)  - IBW: 52kg     Pertinent Lab Data: () Na-135, K-4.3, CL-102, BUN-11, Cr-0.8, Glucose-90mg/dL, Corrected Ca-8.8 (WNL), H/H-8/25.7, WBC-11.07     Pertinent Meds: 0.9% NS at 75mL/hr, Eliquis, Lisinopril, Protonix, Sucralfate, Synthroid      Physical Findings:  - Appearance: AAOx3, Well nourished  - GI function: The patient had a bowel movement  - Tubes: N/A  - Oral/Mouth cavity: No chewing and swallowing difficulty  - Skin: Intact (Macho Score-18)     Nutrition Requirements  Weight Used: 87.3kg -Derived from nutrition note ()     Estimated Energy Needs    Continue [x]  Adjust []  Adjusted Energy Recommendations: ~1400kcal/day (MSJ x 1.0) - BMI > 30 considered -Derived from nutrition note ()     Estimated Protein Needs    Continue [x]  Adjust []  Adjusted Protein Recommendations:  63-73gm/day (1.2-1.4gm/kg IBW) -Derived from nutrition note ()     Estimated Fluid Needs        Continue [x]  Adjust []  Adjusted Fluid Recommendations: 1500ml/day as per LIP -Derived from nutrition note ()      Nutrient Intake: PO intake is >75%      [] Previous Nutrition Diagnosis:            [] Ongoing          [] Resolved    [x] No active nutrition diagnosis identified at this time     Nutrition Diagnostic #1  Problem:  Etiology:  Statement:     Nutrition Diagnostic #2  Problem:  Etiology:  Statement:     Nutrition Intervention:  1.Meals and Snacks     Goal/Expected Outcome:  1.Continue to consume/tolerate >75% of meals in 7 days     Indicator/Monitorin.Monitor/Evaluate diet order, energy intake, nutrition focused physical findings, anemia profile    Recommendations:  1.Continue to provide a DASH/TLC diet with 1.5 Liter fluid restriction

## 2020-09-13 NOTE — PROGRESS NOTE ADULT - ASSESSMENT
ASSESSMENT AND PLAN:    The patient is a 64 year-old female with a PMH of HTN and hypothyroidism and a PSH of appendectomy and tonsillectomy presenting for shortness of breath, admitted for pulmonary embolism.    # Liver abscess  on CT  - cont rocephin and flagyl   - discussed with Id Dr marshall  - sp Ir jaycob and pigtail cath left inplace - monitor outpt   - cont eliquis   - unclear etiology of liver abscess - follow up on culture and sensitivities -prelim abscess cx neg  - no further diarrhea   - discussed with Dr marshall - if cxs remain negative - then likely po antibiotics and no need for picc line - follow up official recs     # transaminitis   - possibly combined due to liver abscess and medication induced - improved   LIVER FUNCTIONS - ( 12 Sep 2020 05:41 )  Alb: 2.8 g/dL / Pro: 6.0 g/dL / ALK PHOS: 141 U/L / ALT: 46 U/L / AST: 32 U/L / GGT: x             # Acute respiratory failure due to  submassive acute pulmonary embolism  - resolved   - s/p thrombectomy by  IR on 8/31  - pt is off oxygen and comfortable at rest now on RA  -cont eliquis  -  Lidoderm patch for pleuritic chest pain     # Acute diastolic CHF/ PHTN  - resolved   - c/w  Fluid restriction 1500 ml in 24 hours, daily weight  - low sodium diet, intake and output monitoring   - pt is clinically improving after thrombectomy   - troponin WNL   - monitor for fluid overload       #  PUD Gastric ulcer at pylorus/ Gastritis   - pt has recent EGD , report is in the paper chart  - biopsy was negative for H.Pylori / dysplasia    - Protonix 40 mg BID    - Carafate 1 gm Q 12 hour   - avoid NSAIDS   - No evidence of active bleed  - pt was consulted by GI, recommendations noted     # Iron deficiency anemia  - received IV Venofer while in the hospital   - no active bleeding noted   - monitor H/H, keep Hb above 7.5 and active type/cross        # H/o hypertension  - DASH diet   -on  Lisinopril     # Inability to walk/ spinal stenosis/ degenerative Dz in L-spine/ Left hip pain   - MRI findings noted , case d/w ortho attending  - s/p  arthrocentesis by IR   - pain control  - PT/rehab evaluation   - fall precautions   - OOB to chair     #Progress Note Handoff  liver abscess cultures  abx plan   updated friend Dr Terrazas on number listed as emergency contact

## 2020-09-13 NOTE — PROGRESS NOTE ADULT - SUBJECTIVE AND OBJECTIVE BOX
DAILY PROGRESS NOTE  ===========================================================    Patient Information:  IOANA POTTER  /  64y  /  Female  /  MRN#: [id number]    Hospital Day: 14d     |:::::::::::::::::::::::::::| SUBJECTIVE |:::::::::::::::::::::::::::|    OVERNIGHT EVENTS:   TODAY: Patient was seen today at bedside. Review of systems is otherwise negative.    |:::::::::::::::::::::::::::| OBJECTIVE |:::::::::::::::::::::::::::|    VITAL SIGNS: Last 24 Hours  T(C): 37.9 (13 Sep 2020 04:51), Max: 38.2 (12 Sep 2020 13:28)  T(F): 100.3 (13 Sep 2020 04:51), Max: 100.8 (12 Sep 2020 13:28)  HR: 97 (13 Sep 2020 04:51) (82 - 97)  BP: 123/78 (13 Sep 2020 04:51) (123/78 - 141/69)  BP(mean): --  RR: 18 (13 Sep 2020 04:51) (18 - 20)  SpO2: 95% (13 Sep 2020 00:04) (94% - 95%)    09-11-20 @ 07:01  -  09-12-20 @ 07:00  --------------------------------------------------------  IN: 0 mL / OUT: 33 mL / NET: -33 mL    09-12-20 @ 07:01  -  09-13-20 @ 06:17  --------------------------------------------------------  IN: 0 mL / OUT: 25 mL / NET: -25 mL      PHYSICAL EXAM:  GENERAL:   Awake, alert; NAD.  HEENT:  Head NC/AT; Conjunctivae pink, Sclera anicteric; Oral mucosa moist.  CARDIO:   Regular rate; Regular rhythm; S1 & S2.  RESP:   No rales, wheezing, or rhonchi appreciated.  GI:   Soft; NT/ND; BS; No guarding; No rebound tenderness.  EXT:   Stregnth UE 5/5; Strength LE 5/5; No edema.   SKIN:   Intact.    LAB RESULTS:                        7.9    7.83  )-----------( 265      ( 12 Sep 2020 05:41 )             25.9     09-12    137  |  103  |  14  ----------------------------<  100<H>  3.8   |  23  |  0.7    Ca    8.4<L>      12 Sep 2020 05:41  Mg     2.0     09-11    TPro  6.0  /  Alb  2.8<L>  /  TBili  0.3  /  DBili  x   /  AST  32  /  ALT  46<H>  /  AlkPhos  141<H>  09-12    PTT - ( 12 Sep 2020 11:28 )  PTT:67.3 sec            MICROBIOLOGY:    Culture - Acid Fast - Other w/Smear (collected 10 Sep 2020 16:20)  Source: .Other Other, Liver Abscess    Culture - Abscess with Gram Stain (collected 10 Sep 2020 16:20)  Source: .Abscess Liver Abscess  Preliminary Report (12 Sep 2020 19:05):    No growth    Culture - Blood (collected 10 Sep 2020 11:32)  Source: .Blood None  Preliminary Report (11 Sep 2020 22:01):    No growth to date.      RADIOLOGY:    ALLERGIES:  No Known Allergies      ===========================================================     DAILY PROGRESS NOTE  ===========================================================    Patient Information:  IOANA POTTER  /  64y  /  Female  /  MRN#: [id number]    Hospital Day: 14d     |:::::::::::::::::::::::::::| SUBJECTIVE |:::::::::::::::::::::::::::|    OVERNIGHT EVENTS: None   TODAY: Patient was seen today at bedside. Appears comfortable. Review of systems is otherwise negative.    |:::::::::::::::::::::::::::| OBJECTIVE |:::::::::::::::::::::::::::|    VITAL SIGNS: Last 24 Hours  T(C): 37.9 (13 Sep 2020 04:51), Max: 38.2 (12 Sep 2020 13:28)  T(F): 100.3 (13 Sep 2020 04:51), Max: 100.8 (12 Sep 2020 13:28)  HR: 97 (13 Sep 2020 04:51) (82 - 97)  BP: 123/78 (13 Sep 2020 04:51) (123/78 - 141/69)  BP(mean): --  RR: 18 (13 Sep 2020 04:51) (18 - 20)  SpO2: 95% (13 Sep 2020 00:04) (94% - 95%)    09-11-20 @ 07:01  -  09-12-20 @ 07:00  --------------------------------------------------------  IN: 0 mL / OUT: 33 mL / NET: -33 mL    09-12-20 @ 07:01  -  09-13-20 @ 06:17  --------------------------------------------------------  IN: 0 mL / OUT: 25 mL / NET: -25 mL      PHYSICAL EXAM:  GENERAL:   Awake, alert; NAD.  HEENT:  Head NC/AT; Conjunctivae pink, Sclera anicteric; Oral mucosa moist.  CARDIO:   Regular rate; Regular rhythm; S1 & S2.  RESP:   No rales, wheezing, or rhonchi appreciated.  GI:   Soft; NT/ND; BS; No guarding; No rebound tenderness.  EXT:   Stregnth UE 5/5; Strength LE 5/5; No edema.   SKIN:   Intact.   Catheter present  in the RUQ draining dark red fluid about 1ml    LAB RESULTS:                        7.9    7.83  )-----------( 265      ( 12 Sep 2020 05:41 )             25.9     09-12    137  |  103  |  14  ----------------------------<  100<H>  3.8   |  23  |  0.7    Ca    8.4<L>      12 Sep 2020 05:41  Mg     2.0     09-11    TPro  6.0  /  Alb  2.8<L>  /  TBili  0.3  /  DBili  x   /  AST  32  /  ALT  46<H>  /  AlkPhos  141<H>  09-12    PTT - ( 12 Sep 2020 11:28 )  PTT:67.3 sec            MICROBIOLOGY:    Culture - Acid Fast - Other w/Smear (collected 10 Sep 2020 16:20)  Source: .Other Other, Liver Abscess    Culture - Abscess with Gram Stain (collected 10 Sep 2020 16:20)  Source: .Abscess Liver Abscess  Preliminary Report (12 Sep 2020 19:05):    No growth    Culture - Blood (collected 10 Sep 2020 11:32)  Source: .Blood None  Preliminary Report (11 Sep 2020 22:01):    No growth to date.      RADIOLOGY:    ALLERGIES:  No Known Allergies      ===========================================================

## 2020-09-13 NOTE — PROGRESS NOTE ADULT - ASSESSMENT
Patient is a 63 y/o woman with PMH of HTN, PUD, and hypothyroidism presenting for SOB. Found to have PE. Course complicated by necrosis of femoral head; cause unknown. Patient found to have hypoechogenicity on US of liver; suspicious for abscess. CT again revealed a foci suspicious for abscess.    #Pulmonary embolism  -Had thrombectomy on 8/31  -Currently on room air, saturating well  -Holding Eliquis for IR drainage today  -Started on heparin drip; follow PTT (goal 50-80), 82.5 this AM      # Liver abscess  -Transaminitis with fevers Tmax 24hrs- 100.8  -AST/ALT - Downtrending   -CT scan with contrast performed showed a 6.1 x 4.9x 6.0 cm multiloculated structure in left hepatic lobe concerning for abscess  -ID consulted: continue ceftriaxone 2g Q24, metronidazole 500 Q8  -IR consulted; for drainage Friday 9/11, f/u G/S & culture, AFB  -Eliquis held; heparin gtt- hold in AM  -NPO Thursday night    # L Femoral head necrosis   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 with surrounding edema/inflammation with extensive joint effusion  -per ortho- If no evidence of infection , the pt should complete treatment for PE and undergo elective MARIA C at a later date   - s/p IR guided drainage: 520362 RBCs and 3351 nucleated cells (83% neutrophils)  - Blood Cx 9/3 NG, 9/5 BCX NGTD   - Hip Cx 9/4 NGTD  -PPD placed; negative; quantiferon - negative  -PT following     # CHF  -Fluid restriction 1.5L QD  -Daily weights  -I+O    # PUD  -EGD outpatient showing deep ulcer  -Protonix 40 BID  -Carafate 1gm BID  -CBC  -keep active Type and screen     # Iron Deficiency anemia s/p IV  Venofer 200mg 09/03/2020  -Monitor hemoglobin  -Transfuse if <7.5  -Hb 7.6 --> 8.4 -->7.6     #Loose Bowel movements (3)  Pt got Miralax - held, monitor for the next 36 hours, if unresolving, perform cdiff PCR    #Monitor for Hyponatremia   - Ranges from 133-138      # HTN  -DASH  -Lisinopril    # Spinal stenosis  -PT/Rehab  -Pain control  -Fall precautions    Dispo: Pending Liver Abscess drainage     Patient is a 63 y/o woman with PMH of HTN, PUD, and hypothyroidism presenting for SOB. Found to have PE. Course complicated by necrosis of femoral head; cause unknown. Patient found to have hypoechogenicity on US of liver; suspicious for abscess. CT again revealed a foci suspicious for abscess.    #Pulmonary embolism  -Had thrombectomy on 8/31  -Currently on room air, saturating well  -Eliquis restarted 09/12      # Liver abscess  -Transaminitis with fevers Tmax 24hrs- 100.3  -AST/ALT - Downtrending   -CT scan with contrast performed showed a 6.1 x 4.9x 6.0 cm multiloculated structure in left hepatic lobe concerning for abscess - drained by IR  s/p 8.5 [place] pigtail catheter 09/11. Approximately 20 ml of purulent fluid aspirated.   -ID consulted: continue ceftriaxone 2g Q24, metronidazole 500 Q8  -No acid fast bacilli, culture - neg prelim  -Eliquis restarted      # L Femoral head necrosis   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 with surrounding edema/inflammation with extensive joint effusion  -per ortho- If no evidence of infection , the pt should complete treatment for PE and undergo elective MARIA C at a later date   - s/p IR guided drainage: 195850 RBCs and 3351 nucleated cells (83% neutrophils)  - Blood Cx 9/3 NG, 9/5 BCX NGTD   - Hip Cx 9/4 NGTD  -PPD placed; negative; quantiferon - negative  -PT following     # CHF  -Fluid restriction 1.5L QD  -Daily weights  -I+O    # PUD  -EGD outpatient showing deep ulcer  -Protonix 40 BID  -Carafate 1gm BID  -CBC  -keep active Type and screen     # Iron Deficiency anemia s/p IV  Venofer 200mg 09/03/2020  -Monitor hemoglobin  -Transfuse if <7.5  -Hb 7.6 --> 8.4 -->7.6     #Loose Bowel movements (3)  Pt got Miralax - held, monitor for the next 36 hours, if unresolving, perform cdiff PCR    #Monitor for Hyponatremia   - Ranges from 133-138  CELI    # HTN  -DASH  -Lisinopril    # Spinal stenosis  -PT/Rehab  -Pain control  -Fall precautions    Dispo: Pt needs PICC line but is travelling, reconsult ID to see if oral Abx will be sufficient

## 2020-09-14 LAB
ALBUMIN SERPL ELPH-MCNC: 2.8 G/DL — LOW (ref 3.5–5.2)
ALP SERPL-CCNC: 106 U/L — SIGNIFICANT CHANGE UP (ref 30–115)
ALT FLD-CCNC: 45 U/L — HIGH (ref 0–41)
ANION GAP SERPL CALC-SCNC: 12 MMOL/L — SIGNIFICANT CHANGE UP (ref 7–14)
AST SERPL-CCNC: 66 U/L — HIGH (ref 0–41)
BASOPHILS # BLD AUTO: 0.01 K/UL — SIGNIFICANT CHANGE UP (ref 0–0.2)
BASOPHILS NFR BLD AUTO: 0.1 % — SIGNIFICANT CHANGE UP (ref 0–1)
BILIRUB SERPL-MCNC: 0.2 MG/DL — SIGNIFICANT CHANGE UP (ref 0.2–1.2)
BUN SERPL-MCNC: 14 MG/DL — SIGNIFICANT CHANGE UP (ref 10–20)
CALCIUM SERPL-MCNC: 8.1 MG/DL — LOW (ref 8.5–10.1)
CHLORIDE SERPL-SCNC: 98 MMOL/L — SIGNIFICANT CHANGE UP (ref 98–110)
CO2 SERPL-SCNC: 23 MMOL/L — SIGNIFICANT CHANGE UP (ref 17–32)
CREAT SERPL-MCNC: 0.7 MG/DL — SIGNIFICANT CHANGE UP (ref 0.7–1.5)
EOSINOPHIL # BLD AUTO: 0.16 K/UL — SIGNIFICANT CHANGE UP (ref 0–0.7)
EOSINOPHIL NFR BLD AUTO: 2 % — SIGNIFICANT CHANGE UP (ref 0–8)
GLUCOSE SERPL-MCNC: 90 MG/DL — SIGNIFICANT CHANGE UP (ref 70–99)
HCT VFR BLD CALC: 25.2 % — LOW (ref 37–47)
HGB BLD-MCNC: 7.8 G/DL — LOW (ref 12–16)
IMM GRANULOCYTES NFR BLD AUTO: 0.9 % — HIGH (ref 0.1–0.3)
LYMPHOCYTES # BLD AUTO: 1.49 K/UL — SIGNIFICANT CHANGE UP (ref 1.2–3.4)
LYMPHOCYTES # BLD AUTO: 18.4 % — LOW (ref 20.5–51.1)
MCHC RBC-ENTMCNC: 28.9 PG — SIGNIFICANT CHANGE UP (ref 27–31)
MCHC RBC-ENTMCNC: 31 G/DL — LOW (ref 32–37)
MCV RBC AUTO: 93.3 FL — SIGNIFICANT CHANGE UP (ref 81–99)
METHOD TYPE: SIGNIFICANT CHANGE UP
MONOCYTES # BLD AUTO: 0.5 K/UL — SIGNIFICANT CHANGE UP (ref 0.1–0.6)
MONOCYTES NFR BLD AUTO: 6.2 % — SIGNIFICANT CHANGE UP (ref 1.7–9.3)
NEUTROPHILS # BLD AUTO: 5.87 K/UL — SIGNIFICANT CHANGE UP (ref 1.4–6.5)
NEUTROPHILS NFR BLD AUTO: 72.4 % — SIGNIFICANT CHANGE UP (ref 42.2–75.2)
NRBC # BLD: 0 /100 WBCS — SIGNIFICANT CHANGE UP (ref 0–0)
PLATELET # BLD AUTO: 373 K/UL — SIGNIFICANT CHANGE UP (ref 130–400)
POTASSIUM SERPL-MCNC: 4 MMOL/L — SIGNIFICANT CHANGE UP (ref 3.5–5)
POTASSIUM SERPL-SCNC: 4 MMOL/L — SIGNIFICANT CHANGE UP (ref 3.5–5)
PROT SERPL-MCNC: 5.7 G/DL — LOW (ref 6–8)
RBC # BLD: 2.7 M/UL — LOW (ref 4.2–5.4)
RBC # FLD: 13.3 % — SIGNIFICANT CHANGE UP (ref 11.5–14.5)
SODIUM SERPL-SCNC: 133 MMOL/L — LOW (ref 135–146)
WBC # BLD: 8.1 K/UL — SIGNIFICANT CHANGE UP (ref 4.8–10.8)
WBC # FLD AUTO: 8.1 K/UL — SIGNIFICANT CHANGE UP (ref 4.8–10.8)

## 2020-09-14 PROCEDURE — 99233 SBSQ HOSP IP/OBS HIGH 50: CPT

## 2020-09-14 RX ORDER — APIXABAN 2.5 MG/1
1 TABLET, FILM COATED ORAL
Qty: 180 | Refills: 0
Start: 2020-09-14 | End: 2020-12-12

## 2020-09-14 RX ADMIN — Medication 100 MILLIGRAM(S): at 13:49

## 2020-09-14 RX ADMIN — OXYCODONE HYDROCHLORIDE 5 MILLIGRAM(S): 5 TABLET ORAL at 01:48

## 2020-09-14 RX ADMIN — APIXABAN 5 MILLIGRAM(S): 2.5 TABLET, FILM COATED ORAL at 17:16

## 2020-09-14 RX ADMIN — Medication 1 GRAM(S): at 17:16

## 2020-09-14 RX ADMIN — Medication 50 MICROGRAM(S): at 05:10

## 2020-09-14 RX ADMIN — Medication 100 MILLIGRAM(S): at 05:10

## 2020-09-14 RX ADMIN — Medication 1 GRAM(S): at 05:10

## 2020-09-14 RX ADMIN — PANTOPRAZOLE SODIUM 40 MILLIGRAM(S): 20 TABLET, DELAYED RELEASE ORAL at 05:10

## 2020-09-14 RX ADMIN — Medication 100 MILLIGRAM(S): at 21:44

## 2020-09-14 RX ADMIN — LISINOPRIL 5 MILLIGRAM(S): 2.5 TABLET ORAL at 05:10

## 2020-09-14 RX ADMIN — PANTOPRAZOLE SODIUM 40 MILLIGRAM(S): 20 TABLET, DELAYED RELEASE ORAL at 17:16

## 2020-09-14 RX ADMIN — APIXABAN 5 MILLIGRAM(S): 2.5 TABLET, FILM COATED ORAL at 05:10

## 2020-09-14 RX ADMIN — CEFTRIAXONE 100 MILLIGRAM(S): 500 INJECTION, POWDER, FOR SOLUTION INTRAMUSCULAR; INTRAVENOUS at 10:21

## 2020-09-14 RX ADMIN — CHLORHEXIDINE GLUCONATE 1 APPLICATION(S): 213 SOLUTION TOPICAL at 05:11

## 2020-09-14 NOTE — PROGRESS NOTE ADULT - SUBJECTIVE AND OBJECTIVE BOX
IOANA POTTER  64y, Female  Allergy: No Known Allergies      LOS  15d    CHIEF COMPLAINT: pulmonary embolism (13 Sep 2020 11:21)      INTERVAL EVENTS/HPI  - No acute events overnight  - T(F): , Max: 99.6 (09-13-20 @ 20:40)  - Denies any worsening symptoms  - Tolerating medication  - WBC Count: 11.07 (09-13-20 @ 08:03)  WBC Count: 7.83 (09-12-20 @ 05:41)     - Creatinine, Serum: 0.8 (09-13-20 @ 08:03)       ROS  General: Denies rigors, nightsweats  HEENT: Denies headache, rhinorrhea, sore throat, eye pain  CV: Denies CP, palpitations  PULM: Denies wheezing, hemoptysis  GI: Denies hematemesis, hematochezia, melena  : Denies discharge, hematuria  MSK: Denies arthralgias, myalgias  SKIN: Denies rash, lesions  NEURO: Denies paresthesias, weakness  PSYCH: Denies depression, anxiety    VITALS:  T(F): 98.4, Max: 99.6 (09-13-20 @ 20:40)  HR: 82  BP: 134/75  RR: 20Vital Signs Last 24 Hrs  T(C): 36.9 (14 Sep 2020 04:50), Max: 37.6 (13 Sep 2020 20:40)  T(F): 98.4 (14 Sep 2020 04:50), Max: 99.6 (13 Sep 2020 20:40)  HR: 82 (14 Sep 2020 07:41) (80 - 92)  BP: 134/75 (14 Sep 2020 04:50) (119/60 - 134/75)  BP(mean): --  RR: 20 (14 Sep 2020 04:50) (18 - 20)  SpO2: 94% (14 Sep 2020 07:41) (94% - 95%)    PHYSICAL EXAM:  Gen: NAD, resting in bed  HEENT: Normocephalic, atraumatic  Neck: supple, no lymphadenopathy  CV: Regular rate & regular rhythm  Lungs: decreased BS at bases, no fremitus  Abdomen: Soft, BS present  Ext: Warm, well perfused  Neuro: non focal, awake  Skin: no rash, no erythema  Lines: no phlebitis    FH: Non-contributory  Social Hx: Non-contributory    TESTS & MEASUREMENTS:                        8.0    11.07 )-----------( 327      ( 13 Sep 2020 08:03 )             25.7     09-13    135  |  102  |  11  ----------------------------<  90  4.3   |  23  |  0.8    Ca    7.8<L>      13 Sep 2020 08:03    TPro  5.5<L>  /  Alb  2.7<L>  /  TBili  0.3  /  DBili  x   /  AST  70<H>  /  ALT  46<H>  /  AlkPhos  123<H>  09-13    eGFR if Non African American: 77 mL/min/1.73M2 (09-13-20 @ 08:03)  eGFR if : 89 mL/min/1.73M2 (09-13-20 @ 08:03)    LIVER FUNCTIONS - ( 13 Sep 2020 08:03 )  Alb: 2.7 g/dL / Pro: 5.5 g/dL / ALK PHOS: 123 U/L / ALT: 46 U/L / AST: 70 U/L / GGT: x               Culture - Acid Fast - Other w/Smear (collected 09-10-20 @ 16:20)  Source: .Other Other, Liver Abscess    Culture - Abscess with Gram Stain (collected 09-10-20 @ 16:20)  Source: .Abscess Liver Abscess  Preliminary Report (09-13-20 @ 20:11):    Few Streptococcus intermedius "Susceptibilities not performed"    Culture - Blood (collected 09-10-20 @ 11:32)  Source: .Blood None  Preliminary Report (09-11-20 @ 22:01):    No growth to date.    Culture - Blood (collected 09-06-20 @ 06:53)  Source: .Blood None  Final Report (09-11-20 @ 18:00):    No Growth Final    Culture - Blood (collected 09-05-20 @ 11:42)  Source: .Blood None  Final Report (09-10-20 @ 19:00):    No Growth Final    Culture - Body Fluid with Gram Stain (collected 09-04-20 @ 10:30)  Source: .Body Fluid Joint Fluid, Left Hip  Gram Stain (09-04-20 @ 22:29):    No polymorphonuclear cells seen    No organisms seen  Preliminary Report (09-05-20 @ 17:11):    No growth    Culture - Blood (collected 09-03-20 @ 20:52)  Source: .Blood None  Final Report (09-09-20 @ 04:01):    No Growth Final    Culture - Urine (collected 09-03-20 @ 18:00)  Source: .Urine Catheterized  Final Report (09-05-20 @ 20:47):    <10,000 CFU/mL Normal Urogenital Julianne    Culture - Urine (collected 09-03-20 @ 01:30)  Source: .Urine Clean Catch (Midstream)  Final Report (09-05-20 @ 18:36):    >100,000 CFU/ml Escherichia coli  Organism: Escherichia coli (09-05-20 @ 18:36)  Organism: Escherichia coli (09-05-20 @ 18:36)      -  Amikacin: S <=16      -  Amoxicillin/Clavulanic Acid: S <=8/4      -  Ampicillin: R >16 These ampicillin results predict results for amoxicillin      -  Ampicillin/Sulbactam: I 16/8 Enterobacter, Citrobacter, and Serratia may develop resistance during prolonged therapy (3-4 days)      -  Aztreonam: S <=4      -  Cefazolin: S <=2 (MIC_CL_COM_[id number]) For uncomplicated UTI with K. pneumoniae, E. coli, or P. mirablis: HAKAN <=16 is sensitive and HAKAN >=32 is resistant. This also predicts results for oral agents cefaclor, cefdinir, cefpodoxime, cefprozil, cefuroxime axetil, cephalexin and locarbef for uncomplicated UTI. Note that some isolates may be susceptible to these agents while testing resistant to cefazolin.      -  Cefepime: S <=2      -  Cefoxitin: S <=8      -  Ceftriaxone: S <=1 Enterobacter, Citrobacter, and Serratia may develop resistance during prolonged therapy      -  Ciprofloxacin: S <=0.25      -  Ertapenem: S <=0.5      -  Gentamicin: S <=2      -  Imipenem: S <=1      -  Levofloxacin: S <=0.5      -  Meropenem: S <=1      -  Nitrofurantoin: S <=32 Should not be used to treat pyelonephritis      -  Piperacillin/Tazobactam: S <=8      -  Tigecycline: S <=2      -  Tobramycin: S <=2      -  Trimethoprim/Sulfamethoxazole: R >2/38      Method Type: HAKAN            INFECTIOUS DISEASES TESTING  Procalcitonin, Serum: 0.23 (09-09-20 @ 06:18)  COVID-19 PCR: NotDetec (09-08-20 @ 10:02)  MRSA PCR Result.: Negative (09-05-20 @ 17:00)  COVID-19 PCR: NotDetec (08-30-20 @ 16:49)      INFLAMMATORY MARKERS      RADIOLOGY & ADDITIONAL TESTS:  I have personally reviewed the last available Chest xray  CXR      CT      CARDIOLOGY TESTING  12 Lead ECG:   Ventricular Rate 91 BPM    Atrial Rate 91 BPM    P-R Interval 128 ms    QRS Duration 72 ms    Q-T Interval 348 ms    QTC Calculation(Bezet) 428 ms    P Axis 40 degrees    R Axis -23 degrees    T Axis 11 degrees    Diagnosis Line Normal sinus rhythm  Possible Left atrial enlargement  Low voltage QRS  Possible Lateral infarct , age undetermined  Abnormal ECG    Confirmed by ALDA KRAMER MD ([id number]) on 9/7/2020 11:16:38 PM (09-05-20 @ 13:42)  Transthoracic Echocardiogram:    EXAM:  2-D ECHO (TTE) COMPLETE        PROCEDURE DATE:  08/31/2020      INTERPRETATION:  REPORT:  TRANSTHORACIC ECHOCARDIOGRAM REPORT        Patient Name:   IOANA POTTER Accession #: [id number]  Medical Rec #:  [id number]         Height:      63.0 in 160.0 cm  YOB: 1955        Weight:      185.0 lb 83.91 kg  Patient Age:    64 years          BSA:         1.87 m²  Patient Gender: F                 BP:          116/66 mmHg      Date of Exam:        8/31/2020 1:18:07 PM  Referring Physician: [id number] IRINA NAILS  Sonographer:         Felicia Griffin  Reading Physician:   Livier Colvin M.D.    Procedure:   2D Echo/Doppler/Color Doppler Complete.  Indications: R07.9 - Chest Pain, unspecified  Diagnosis:   Chest pain, unspecified - R07.9        Summary:   1. Normal global left ventricular systolic function.   2. LV Ejection Fraction by Hardin's Method with a biplane EF of 59 %.   3. Mild left ventricular hypertrophy.   4. Mildly increased LV wall thickness.   5. Spectral Doppler shows impaired relaxation pattern of left ventricular myocardial filling (Grade I diastolic dysfunction).   6. Normal left atrial size.   7. Normal right atrial size.   8. Structurally normal mitral valve, with normal leaflet excursion.   9. Trace mitral valve regurgitation.  10. Mild-moderate tricuspid regurgitation.  11. Sclerotic aortic valve with normal opening.  12. Mild pulmonic valve regurgitation.  13. Estimated pulmonary artery systolic pressure is 47.5 mmHg assuming a right atrial pressure of 5 mmHg, which is consistent with mild pulmonary hypertension.  14. LA volume Index is 14.6 ml/m² ml/m2.    PHYSICIAN INTERPRETATION:  Left Ventricle: The left ventricular internal cavity size is normal. Left ventricular wall thicknessis mildly increased. There is mild left ventricular hypertrophy. Global LV systolic function was normal. Spectral Doppler shows impaired relaxation pattern of left ventricular myocardial filling (Grade I diastolic dysfunction). Normal LV filling pressures.  Right Ventricle: Normal right ventricular size and function.  Left Atrium: Normal left atrial size. LA volume Index is 14.6 ml/m² ml/m2.  Right Atrium: Normal right atrial size.  Pericardium: There is no evidence of pericardial effusion.  Mitral Valve: Structurally normal mitral valve, with normal leaflet excursion. Trace mitral valve regurgitation is seen.  Tricuspid Valve: Structurally normal tricuspid valve, with normal leaflet excursion. Mild-moderate tricuspid regurgitation is visualized. Estimated pulmonary artery systolic pressure is 47.5 mmHg assuming a right atrial pressure of 5 mmHg, which is consistent with mild pulmonary hypertension.  Aortic Valve: The aortic valve is trileaflet. Sclerotic aortic valve with normal opening.No evidence of aortic valve regurgitation is seen.  Pulmonic Valve: Structurally normal pulmonic valve, with normal leaflet excursion. Mild pulmonic valve regurgitation.  Aorta: The aortic root and ascending aorta are structurally normal, with no evidence of dilitation.  Pulmonary Artery: The main pulmonary artery is normal in size.  Venous: The inferior vena cava was normal sized, with respiratory size variation greater than 50%.      2D AND M-MODE MEASUREMENTS (normal ranges within parentheses):  Left                  Normal   Aorta/Left             Normal  Ventricle:                     Atrium:  IVSd (2D):  1.13 cm  (0.7-1.1) AoV Cusp       1.76  (1.5-2.6)  LVPWd (2D): 0.72 cm  (0.7-1.1) Separation:     cm  LVIDd (2D): 3.98 cm  (3.4-5.7) Left Atrium    2.70  (1.9-4.0)  LVIDs (2D): 2.75 cm            (Mmode):        cm  LV FS (2D):  30.9 %   (>25%)   LA Volume      14.5  Relative      0.36    (<0.42)  Index         ml/m²  Wall                           Right  Thickness   Ventricle:                                 RVd (2D):      3.19 cm    SPECTRAL DOPPLER ANALYSIS:  LV DIASTOLIC FUNCTION:  MV Peak E: 0.49 m/s Decel Time: 270 msec  MV Peak A: 0.90 m/s  E/A Ratio: 0.54    LVOT Vmax: 1.31 m/s  LVOT VTI:  0.18 m  LVOTDiam: 1.77 cm    Mitral Valve:  MV P1/2 Time: 78.40 msec  MV Area, PHT: 2.81 cm²    Tricuspid Valve and PA/RV Systolic Pressure: TR Max Velocity: 3.26 m/s RA Pressure: 5 mmHg RVSP/PASP: 47.5 mmHg      [id number] Livier Colvin M.D., Electronically signedon 8/31/2020 at 4:35:15 PM            *** Final ***              LIVIER COLVNI M.D., RESIDENT RADIOLOGIST  This document has been electronically signed.  LIVIER COLVIN M.D., RESIDENT RADIOLOGIST  This document has been electronically signed. Aug [id number]  1:18PM             (08-31-20 @ 13:18)      MEDICATIONS  apixaban 5 Oral every 12 hours  cefTRIAXone   IVPB 2000 IV Intermittent every 24 hours  chlorhexidine 4% Liquid 1 Topical <User Schedule>  levothyroxine 50 Oral daily  lisinopril 5 Oral daily  metroNIDAZOLE  IVPB 500 IV Intermittent every 8 hours  pantoprazole   Suspension 40 Oral two times a day  sodium chloride 0.9%. 1000 IV Continuous <Continuous>  sucralfate 1 Oral every 12 hours      WEIGHT  Weight (kg): 87.3 (09-01-20 @ 15:49)  Creatinine, Serum: 0.8 mg/dL (09-13-20 @ 08:03)      ANTIBIOTICS:  cefTRIAXone   IVPB 2000 milliGRAM(s) IV Intermittent every 24 hours  metroNIDAZOLE  IVPB 500 milliGRAM(s) IV Intermittent every 8 hours      All available historical records have been reviewed

## 2020-09-14 NOTE — PROGRESS NOTE ADULT - SUBJECTIVE AND OBJECTIVE BOX
----------Daily Progress Note----------    HISTORY OF PRESENT ILLNESS:  Patient is a 64y old Female who presents with a chief complaint of pulmonary embolism (14 Sep 2020 15:27)    Currently admitted to medicine with the primary diagnosis of Pulmonary embolism       Today is hospital day 15d.     INTERVAL HOSPITAL COURSE / OVERNIGHT EVENTS:    Patient was examined and seen at bedside. This morning she is resting comfortably in bed and reports no new issues or overnight events.     Review of Systems: Otherwise unremarkable     <<<<<PAST MEDICAL & SURGICAL HISTORY>>>>>  HTN (hypertension)    Gastric ulcer    S/P tonsillectomy    S/P appendectomy      ALLERGIES  No Known Allergies    MEDICATIONS  STANDING MEDICATIONS  apixaban 5 milliGRAM(s) Oral every 12 hours  cefTRIAXone   IVPB 2000 milliGRAM(s) IV Intermittent every 24 hours  chlorhexidine 4% Liquid 1 Application(s) Topical <User Schedule>  levothyroxine 50 MICROGram(s) Oral daily  lisinopril 5 milliGRAM(s) Oral daily  metroNIDAZOLE  IVPB 500 milliGRAM(s) IV Intermittent every 8 hours  pantoprazole   Suspension 40 milliGRAM(s) Oral two times a day  sodium chloride 0.9%. 1000 milliLiter(s) IV Continuous <Continuous>  sucralfate 1 Gram(s) Oral every 12 hours    PRN MEDICATIONS  acetaminophen  Suppository .. 650 milliGRAM(s) Rectal every 6 hours PRN  lidocaine   Patch 1 Patch Transdermal daily PRN  oxyCODONE    IR 10 milliGRAM(s) Oral three times a day PRN    VITALS:  T(F): 99.1  HR: 94  BP: 123/64  RR: 18  SpO2: 94%    <<<<<PHYSICAL EXAM>>>>>  GENERAL: Well developed, well nourished and in no acute distress. Resting comfortably in bed.  HEENT: Normocephalic, atraumatic, mucous membrances moist, EOMI, PERRLA, bilateral sclera anicteric, no conjunctival injection  Neck: Supple, non-tender, no lymphadenopathy.  PULMONARY: Clear to auscultation bilaterally. No rales, ronchi, or wheezing.  CARDIOVASCULAR: Regular rate and rhythm, S1-S2, no murmurs  GASTROINTESTINAL: Soft tenderness to palpation in RUQ, non-distended, no guarding. Bowel sounds present in all 4 quadarants. Right sided SUHAS drain <5 cc serosanguinous fluid  SKIN/EXTREMITIES: No clubbing or edema  NEUROLOGIC/MUSCULOSKELETAL: AOx4, cranial nerves II-XII grossly intact. no focal deficits    <<<<<LABS>>>>>                        7.8    8.10  )-----------( 373      ( 14 Sep 2020 06:55 )             25.2     09-14    133<L>  |  98  |  14  ----------------------------<  90  4.0   |  23  |  0.7    Ca    8.1<L>      14 Sep 2020 06:55    TPro  5.7<L>  /  Alb  2.8<L>  /  TBili  0.2  /  DBili  x   /  AST  66<H>  /  ALT  45<H>  /  AlkPhos  106  09-14            [id number]        <<<<<RADIOLOGY>>>>>        Assessment and Plan:   Patient is a 65 y/o woman with PMH of HTN, PUD, and hypothyroidism presenting for SOB. Found to have PE. Course complicated by necrosis of femoral head; cause unknown. Patient found to have hypoechogenicity on US of liver; suspicious for abscess. CT again revealed a foci suspicious for abscess.    #Pulmonary embolism s/p thrombectomy   -Had thrombectomy on 8/31  -Currently on room air, saturating well  -Eliquis restarted 09/12  -Continue to monitor    #Liver abscess  -Transaminitis with fevers Tmax 24hrs- 100.3  -AST/ALT - Downtrending   -CT scan with contrast performed showed a 6.1 x 4.9x 6.0 cm multiloculated structure in left hepatic lobe concerning for abscess - drained by IR  s/p 8.5 [place] pigtail catheter 09/11. Approximately 20 ml of purulent fluid aspirated.   -ID consulted: continue ceftriaxone 2g Q24, metronidazole 500 Q8  -No acid fast bacilli, culture - neg prelim  -Eliquis restarted  -Patient is requesting to go home [facility], however IR and ID recommending 4-6 weeks treatment for abscess resolution and abx course. Pending case work recs.    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 with surrounding edema/inflammation with extensive joint effusion  -per ortho- If no evidence of infection, the pt should complete treatment for PE and undergo elective MARIA C at a later date   - s/p IR guided drainage: [id number] RBCs and 3351 nucleated cells (83% neutrophils)  - Blood Cx 9/3 NG, 9/5 BCX NGTD   - Hip Cx 9/4 NGTD  -PPD placed; negative; quantiferon - negative  -PT following     #CHF  -Fluid restriction 1.5L QD  -Daily weights  -I+O    #PUD  -EGD outpatient showing deep ulcer  -Protonix 40 BID  -Carafate 1gm BID  -CBC  -keep active Type and screen     #Iron Deficiency anemia s/p IV  Venofer 200mg 09/03/2020  -Monitor hemoglobin  -Transfuse if <7.5  -Hb 7.8 today     #Diarrhea  Pt got Miralax - held, monitor for the next 36 hours, if unresolving, perform cdiff PCR    #Monitor for Hyponatremia  - Ranges from 133-138    #HTN  -DASH  -Lisinopril    #Spinal stenosis  -PT/Rehab  -Pain control  -Fall precautions    Dispo: Pt needs PICC line but is travelling, reconsult ID to see if oral Abx will be sufficient ----------Daily Progress Note----------    HISTORY OF PRESENT ILLNESS:  Patient is a 64y old Female who presents with a chief complaint of pulmonary embolism (14 Sep 2020 15:27)    Currently admitted to medicine with the primary diagnosis of Pulmonary embolism       Today is hospital day 15d.     INTERVAL HOSPITAL COURSE / OVERNIGHT EVENTS:    Patient was examined and seen at bedside. This morning she is resting comfortably in bed and reports no new issues or overnight events.     Review of Systems: Otherwise unremarkable     <<<<<PAST MEDICAL & SURGICAL HISTORY>>>>>  HTN (hypertension)    Gastric ulcer    S/P tonsillectomy    S/P appendectomy      ALLERGIES  No Known Allergies    MEDICATIONS  STANDING MEDICATIONS  apixaban 5 milliGRAM(s) Oral every 12 hours  cefTRIAXone   IVPB 2000 milliGRAM(s) IV Intermittent every 24 hours  chlorhexidine 4% Liquid 1 Application(s) Topical <User Schedule>  levothyroxine 50 MICROGram(s) Oral daily  lisinopril 5 milliGRAM(s) Oral daily  metroNIDAZOLE  IVPB 500 milliGRAM(s) IV Intermittent every 8 hours  pantoprazole   Suspension 40 milliGRAM(s) Oral two times a day  sodium chloride 0.9%. 1000 milliLiter(s) IV Continuous <Continuous>  sucralfate 1 Gram(s) Oral every 12 hours    PRN MEDICATIONS  acetaminophen  Suppository .. 650 milliGRAM(s) Rectal every 6 hours PRN  lidocaine   Patch 1 Patch Transdermal daily PRN  oxyCODONE    IR 10 milliGRAM(s) Oral three times a day PRN    VITALS:  T(F): 99.1  HR: 94  BP: 123/64  RR: 18  SpO2: 94%    <<<<<PHYSICAL EXAM>>>>>  GENERAL: Well developed, well nourished and in no acute distress. Resting comfortably in bed.  HEENT: Normocephalic, atraumatic, mucous membrances moist, EOMI, PERRLA, bilateral sclera anicteric, no conjunctival injection  Neck: Supple, non-tender, no lymphadenopathy.  PULMONARY: Clear to auscultation bilaterally. No rales, ronchi, or wheezing.  CARDIOVASCULAR: Regular rate and rhythm, S1-S2, no murmurs  GASTROINTESTINAL: Soft tenderness to palpation in RUQ, non-distended, no guarding. Bowel sounds present in all 4 quadarants. Right sided SUHAS drain <5 cc serosanguinous fluid  SKIN/EXTREMITIES: No clubbing or edema  NEUROLOGIC/MUSCULOSKELETAL: AOx4, cranial nerves II-XII grossly intact. no focal deficits    <<<<<LABS>>>>>                        7.8    8.10  )-----------( 373      ( 14 Sep 2020 06:55 )             25.2     09-14    133<L>  |  98  |  14  ----------------------------<  90  4.0   |  23  |  0.7    Ca    8.1<L>      14 Sep 2020 06:55    TPro  5.7<L>  /  Alb  2.8<L>  /  TBili  0.2  /  DBili  x   /  AST  66<H>  /  ALT  45<H>  /  AlkPhos  106  09-14            [id number]        <<<<<RADIOLOGY>>>>>        Assessment and Plan:   Patient is a 65 y/o woman with PMH of HTN, PUD, and hypothyroidism presenting for SOB. Found to have PE. Course complicated by necrosis of femoral head; cause unknown. Patient found to have hypoechogenicity on US of liver; suspicious for abscess. CT again revealed a foci suspicious for abscess.    #Pulmonary embolism s/p thrombectomy   -Had thrombectomy on 8/31  -Currently on room air, saturating well  -Eliquis restarted 09/12  -Continue to monitor    #Liver abscess  -Transaminitis with fevers Tmax 24hrs- 100.3  -AST/ALT - Downtrending   -CT scan with contrast performed showed a 6.1 x 4.9x 6.0 cm multiloculated structure in left hepatic lobe concerning for abscess - drained by IR  s/p 8.5 [place] pigtail catheter 09/11. Approximately 20 ml of purulent fluid aspirated.   -Liver abscess cx growing Strep intermedius   -ID consulted: continue ceftriaxone 2g Q24, metronidazole 500 Q8  -Eliquis restarted  -Patient is requesting to go home to [facility], however IR and ID recommending 4-6 weeks treatment for abscess resolution and abx course. Pending case work recs.  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 with surrounding edema/inflammation with extensive joint effusion  -per ortho- If no evidence of infection, the pt should complete treatment for PE and undergo elective MARIA C at a later date   - s/p IR guided drainage: [id number] RBCs and 3351 nucleated cells (83% neutrophils)  - Blood Cx 9/3 NG, 9/5 BCX NGTD   - Hip Cx 9/4 NGTD  -PPD placed; negative; quantiferon - negative  -PT following     #CHF  -Fluid restriction 1.5L QD  -Daily weights  -I+O    #PUD  -EGD outpatient showing deep ulcer  -Protonix 40 BID  -Carafate 1gm BID  -CBC  -keep active Type and screen     #Iron Deficiency anemia s/p IV  Venofer 200mg 09/03/2020  -Monitor hemoglobin  -Transfuse if <7.5  -Hb 7.8 today     #Diarrhea  Pt got Miralax - held, monitor for the next 36 hours, if unresolving, perform cdiff PCR    #Monitor for Hyponatremia  - Ranges from 133-138    #HTN  -DASH  -Lisinopril    #Spinal stenosis  -PT/Rehab  -Pain control  -Fall precautions    Dispo: Pt needs PICC line but is travelling, reconsult ID to see if oral Abx will be sufficient

## 2020-09-14 NOTE — PROGRESS NOTE ADULT - SUBJECTIVE AND OBJECTIVE BOX
64 year-old female with a PMH of PUD, HTN and hypothyroidism and a PSH of appendectomy and tonsillectomy presenting for shortness of breath, weakness and dizziness. The patient was found to have submassive pulmonary embolism and underwent thrombectomy on 8/31/20 with IR. The patient was anticoagulated with Heparin ggt. She was followed by GI for PUD.  Pt was downgraded to telemetry from ICU on 9/1/20. While in the hospital pt was diagnosed with iron deficiency amenia.   MRI of the left hip showed significant joint effusion, case discussed with ortho attending, IR consulted , pt underwent arthrocentesis. .  While in the hospital pt is spiking fever, and LFts elevated - found to have liver abscess on CT abdomen - sp IR drainage - 20 cc purulent material - pigtail left inplace draining serosanguinous now +abd pain - pain improved now    PAST MEDICAL & SURGICAL HISTORY  HTN (hypertension)  Gastric ulcer  S/P tonsillectomy  S/P appendectomy      ALLERGIES:  No Known Allergies    Vital Signs Last 24 Hrs  T(C): 37.3 (14 Sep 2020 13:24), Max: 37.6 (13 Sep 2020 20:40)  T(F): 99.1 (14 Sep 2020 13:24), Max: 99.6 (13 Sep 2020 20:40)  HR: 94 (14 Sep 2020 13:24) (80 - 94)  BP: 123/64 (14 Sep 2020 13:24) (119/60 - 134/75)  BP(mean): --  RR: 18 (14 Sep 2020 13:24) (18 - 20)  SpO2: 94% (14 Sep 2020 07:41) (94% - 95%)    PHYSICAL EXAM:  GEN: No acute distress  HEENT: normocephalic, atraumatic  LUNGS: decreased breath sounds at bases   HEART: S1/S2 present. RRR, no murmurs  ABD: ruq, mild tenderness +pigtail cath with stacie bulb draining serosang fluid  EXT: NC/NC/NE/2+PP/FRANCES, decreased ROM in LLE due to pain   NEURO: AAOX3, normal affect, no focal neuro deficit       LABS:                                                               8.0    11.07 )-----------( 327      ( 13 Sep 2020 08:03 )             25.7     09-13    135  |  102  |  11  ----------------------------<  90  4.3   |  23  |  0.8    Ca    7.8<L>      13 Sep 2020 08:03    TPro  5.5<L>  /  Alb  2.7<L>  /  TBili  0.3  /  DBili  x   /  AST  70<H>  /  ALT  46<H>  /  AlkPhos  123<H>  09-13    Culture - Acid Fast - Other w/Smear (collected 10 Sep 2020 16:20)  Source: .Other Other, Liver Abscess    Culture - Abscess with Gram Stain (collected 10 Sep 2020 16:20)  Source: .Abscess Liver Abscess  Preliminary Report (12 Sep 2020 19:05):    No growth    Culture - Blood (collected 10 Sep 2020 11:32)  Source: .Blood None  Preliminary Report (11 Sep 2020 22:01):    No growth to date.        RADIOLOGY:  < from: [facility] Duplex Lower Ext Vein Scan, Bilat (08.31.20 @ 17:39) >  Impression:    No evidence of deep venous thrombosis or superficial thrombophlebitis in the right lower extremity.    Acute left popliteal, posterior tibial peroneal vein DVT    < end of copied text >    < from: IR Procedure (08.31.20 @ 12:48) >  FINDINGS:    1. Preliminary ultrasound demonstrates patent right common femoral vein.    2. Right pulmonary artery angiogram: Extensive filling defects within the right main pulmonary artery extending into the upper and lower lobes.    3. Left pulmonary artery angiogram: Small filling defects within the left lower lobe pulmonary arterial segmental branches with oligemia.    4. Successful mechanical suction thrombectomy in right main pulmonary artery as well as left lower lobe segmental pulmonary artery utilizing Inari suction thrombectomy catheter.    < end of copied text >  < from: Transthoracic Echocardiogram (08.31.20 @ 13:18) >  Summary:   1. Normal global left ventricular systolic function.   2. LV Ejection Fraction by Hardin's Method with a biplane EF of 59 %.   3. Mild left ventricular hypertrophy.   4. Mildly increased LV wall thickness.   5. Spectral Doppler shows impaired relaxation pattern of left ventricular myocardial filling (Grade I diastolic dysfunction).   6. Normal left atrial size.   7. Normal right atrial size.   8. Structurally normal mitral valve, with normal leaflet excursion.   9. Trace mitral valve regurgitation.  10. Mild-moderate tricuspid regurgitation.  11. Sclerotic aortic valve with normal opening.  12. Mild pulmonic valve regurgitation.  13. Estimated pulmonary artery systolic pressure is 47.5 mmHg assuming a right atrial pressure of 5 mmHg, which is consistent with mild pulmonary hypertension.  14. LA volume Index is 14.6 ml/m² ml/m2.    < from: MR Hip w/wo IV Cont, Left (09.02.20 @ 19:42) >  IMPRESSION:    1. 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 Associated stress related sclerosis/edema in the left femoral neck and left acetabulum. Extensive surrounding edema/inflammation. Given these findings, septic arthritis cannot be excluded.  2. Large heterogeneous joint effusion may be due to internal hemorrhage.  3. Apparent tendon tears as above.    < end of copied text >    S/p image guided left hip aspiration, obtaining approx 5cc thick purulent fluid using a 5Fr Hoojaeh needle. Specimen given to primary team resident.    < from: CT Abdomen and Pelvis w/ IV Cont (09.08.20 @ 22:42) >  IMPRESSION:    1.  6.1 x 4.9 x 6.0 cm multiloculated hypoattenuating structure in the left hepatic lobe, compatible with an abscess in the appropriate clinical setting. Mild associated left intrahepatic biliary ductal dilatation. Recommend imaging follow up after treatment to assess for resolution.  2.  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      < end of copied text >  MEDICATIONS  (STANDING):  apixaban 5 milliGRAM(s) Oral every 12 hours  cefTRIAXone   IVPB 2000 milliGRAM(s) IV Intermittent every 24 hours  chlorhexidine 4% Liquid 1 Application(s) Topical <User Schedule>  levothyroxine 50 MICROGram(s) Oral daily  lisinopril 5 milliGRAM(s) Oral daily  metroNIDAZOLE  IVPB 500 milliGRAM(s) IV Intermittent every 8 hours  pantoprazole   Suspension 40 milliGRAM(s) Oral two times a day  sodium chloride 0.9%. 1000 milliLiter(s) (75 mL/Hr) IV Continuous <Continuous>  sucralfate 1 Gram(s) Oral every 12 hours    MEDICATIONS  (PRN):  acetaminophen  Suppository .. 650 milliGRAM(s) Rectal every 6 hours PRN Temp greater or equal to 38C (100.4F)  lidocaine   Patch 1 Patch Transdermal daily PRN chest pain  oxyCODONE    IR 5 milliGRAM(s) Oral three times a day PRN Severe Pain (7 - 10)

## 2020-09-14 NOTE — PROGRESS NOTE ADULT - ASSESSMENT
ASSESSMENT  64yFemale with a PMH of hypertension, peptic ulcer disease, hypothyroidism admitted to hospital for submassive PE s/p thrombectomy of 4 clots. Patient complaining of left hip pain, underwent MRI revealing extensive necrosis of femoral head and acetabulum and joint effusion, now s/p image guided arthrocentesis by IR revealing purulent fluid high in RBCs w moderate WBCs. Patient started on ceftriaxone 2000mg IV q24hr. WBC trending down since admission    IMPRESSION  # Liver abscess    s/p 9/11 Successful placement of a left hepatic lobe abscess 8.5 Khmer pigtail catheter. Approximately 20 ml of purulent fluid aspirated  < from: CT Abdomen and Pelvis w/ IV Cont (09.08.20 @ 22:42) >  6.1 x 4.9 x 6.0 cm multiloculated hypoattenuating structure in the left hepatic lobe, compatible with an abscess in the appropriate clinical setting.     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 with surrounding edema/inflammation with extensive joint effusion  # Low suspicion for septic arthritis s/p image guided arthrocentesis by IR revealing 5cc "purulent" fluid with high level of RBCs and moderate WBCs  - s/p IR guided drainage: [id number] RBCs and 3351 nucleated cells (83% neutrophils)  - Blood Cx 9/3 NG, 9/5 BCX NGTD   - Hip Cx 9/4 NGTD  - Quantiferon NEGATIVE   - TTE 8/31 negative for vegetation       #PE     RECOMMENDATIONS  - ideally would treat with ceftriaxone 2g daily + flagyl for 4-6 weeks, however pt is supposed to return to [facility]    This is a preliminary incomplete pended note, all final recommendations to follow after interview and examination of the patient.    Please call with any questions or send a message on [employer] Teams  Spectra [phone]    ASSESSMENT  64yFemale with a PMH of hypertension, peptic ulcer disease, hypothyroidism admitted to hospital for submassive PE s/p thrombectomy of 4 clots. Patient complaining of left hip pain, underwent MRI revealing extensive necrosis of femoral head and acetabulum and joint effusion, now s/p image guided arthrocentesis by IR revealing purulent fluid high in RBCs w moderate WBCs. Patient started on ceftriaxone 2000mg IV q24hr. WBC trending down since admission    IMPRESSION  # Liver abscess    s/p 9/11 Successful placement of a left hepatic lobe abscess 8.5 Polish pigtail catheter. Approximately 20 ml of purulent fluid aspirated  < from: CT Abdomen and Pelvis w/ IV Cont (09.08.20 @ 22:42) >  6.1 x 4.9 x 6.0 cm multiloculated hypoattenuating structure in the left hepatic lobe, compatible with an abscess in the appropriate clinical setting.     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 with surrounding edema/inflammation with extensive joint effusion  # Low suspicion for septic arthritis s/p image guided arthrocentesis by IR revealing 5cc "purulent" fluid with high level of RBCs and moderate WBCs  - s/p IR guided drainage: [id number] RBCs and 3351 nucleated cells (83% neutrophils)  - Blood Cx 9/3 NG, 9/5 BCX NGTD   - Hip Cx 9/4 NGTD  - Quantiferon NEGATIVE   - TTE 8/31 negative for vegetation       #PE       RECOMMENDATIONS  - ideally would treat with ceftriaxone 2g daily + flagyl for 4-6 weeks from time of liver abscess drain placement, with interval imaging to help determine course  - if patient not accepting PICC line, can possibly plan for PO cefpodoxime 200 mg BID + flagyl 500 mg q 6 hours for 6 weeks; although Strep intermedius may be susceptible to fluoroquinolones, can develop resistance in deep seated infections     Please call with any questions or send a message on [employer] Teams  Spectra [phone]

## 2020-09-14 NOTE — PROGRESS NOTE ADULT - ASSESSMENT
ASSESSMENT AND PLAN:    The patient is a 64 year-old female with a PMH of HTN and hypothyroidism and a PSH of appendectomy and tonsillectomy presenting for shortness of breath, admitted for pulmonary embolism.    # Liver abscess  on CT  - cont rocephin and flagyl   - discussed with Id Dr Boone  - sp Ir jaycob and pigtail cath left inplace - monitor outpt   - cont eliquis   - unclear etiology of liver abscess - follow up on culture and sensitivities -staph intermedius- discussed with ID -ideally would need IV abxs with picc for 6 weeks - however due to social situation - pt refused to have picc and visiting nurse - wants to go back to the [place] asap with oral antibiotics and further care in her country   plan is to continue IV abxs while in the hospital then switch to po vantin and flagyl upon dc  repeat CT ap on 9/15 - to eval IR for removal of drain - very minimal outpt     # transaminitis   - possibly combined due to liver abscess and medication induced - improved   LIVER FUNCTIONS - ( 12 Sep 2020 05:41 )  Alb: 2.8 g/dL / Pro: 6.0 g/dL / ALK PHOS: 141 U/L / ALT: 46 U/L / AST: 32 U/L / GGT: x             # Acute respiratory failure due to  submassive acute pulmonary embolism  - resolved   - s/p thrombectomy by  IR on 8/31  - pt is off oxygen and comfortable at rest now on RA  -cont eliquis  -  Lidoderm patch for pleuritic chest pain     # Acute diastolic CHF/ PHTN  - resolved   - c/w  Fluid restriction 1500 ml in 24 hours, daily weight  - low sodium diet, intake and output monitoring   - pt is clinically improving after thrombectomy   - troponin WNL   - monitor for fluid overload       #  PUD Gastric ulcer at pylorus/ Gastritis   - pt has recent EGD , report is in the paper chart  - biopsy was negative for H.Pylori / dysplasia    - Protonix 40 mg BID    - Carafate 1 gm Q 12 hour   - avoid NSAIDS   - No evidence of active bleed  - pt was consulted by GI, recommendations noted     # Iron deficiency anemia  - received IV Venofer while in the hospital   - no active bleeding noted   - monitor H/H, keep Hb above 7.5 and active type/cross        # H/o hypertension  - DASH diet   -on  Lisinopril     # Inability to walk/ spinal stenosis/ degenerative Dz in L-spine/ Left hip pain   - MRI findings noted , case d/w ortho attending  - s/p  arthrocentesis by IR   - pain control  - PT/rehab evaluation   - fall precautions   - OOB to chair     #Progress Note Handoff  liver abscess cultures  abx plan , CT a/p - IR drain removal ?  updated friend Dr Terrazas on number listed as emergency contact - in agreement with plan  discussed with cm team

## 2020-09-15 LAB
CULTURE RESULTS: SIGNIFICANT CHANGE UP
HCT VFR BLD CALC: 26.4 % — LOW (ref 37–47)
HGB BLD-MCNC: 8 G/DL — LOW (ref 12–16)
MCHC RBC-ENTMCNC: 28.5 PG — SIGNIFICANT CHANGE UP (ref 27–31)
MCHC RBC-ENTMCNC: 30.3 G/DL — LOW (ref 32–37)
MCV RBC AUTO: 94 FL — SIGNIFICANT CHANGE UP (ref 81–99)
NRBC # BLD: 0 /100 WBCS — SIGNIFICANT CHANGE UP (ref 0–0)
PLATELET # BLD AUTO: 457 K/UL — HIGH (ref 130–400)
RBC # BLD: 2.81 M/UL — LOW (ref 4.2–5.4)
RBC # FLD: 13.2 % — SIGNIFICANT CHANGE UP (ref 11.5–14.5)
SPECIMEN SOURCE: SIGNIFICANT CHANGE UP
WBC # BLD: 6.39 K/UL — SIGNIFICANT CHANGE UP (ref 4.8–10.8)
WBC # FLD AUTO: 6.39 K/UL — SIGNIFICANT CHANGE UP (ref 4.8–10.8)

## 2020-09-15 PROCEDURE — 74177 CT ABD & PELVIS W/CONTRAST: CPT | Mod: 26

## 2020-09-15 PROCEDURE — 99233 SBSQ HOSP IP/OBS HIGH 50: CPT

## 2020-09-15 RX ADMIN — Medication 100 MILLIGRAM(S): at 21:07

## 2020-09-15 RX ADMIN — Medication 1 GRAM(S): at 17:09

## 2020-09-15 RX ADMIN — Medication 100 MILLIGRAM(S): at 11:57

## 2020-09-15 RX ADMIN — APIXABAN 5 MILLIGRAM(S): 2.5 TABLET, FILM COATED ORAL at 05:32

## 2020-09-15 RX ADMIN — LISINOPRIL 5 MILLIGRAM(S): 2.5 TABLET ORAL at 05:32

## 2020-09-15 RX ADMIN — CEFTRIAXONE 100 MILLIGRAM(S): 500 INJECTION, POWDER, FOR SOLUTION INTRAMUSCULAR; INTRAVENOUS at 11:22

## 2020-09-15 RX ADMIN — Medication 50 MICROGRAM(S): at 05:32

## 2020-09-15 RX ADMIN — Medication 1 GRAM(S): at 05:32

## 2020-09-15 RX ADMIN — PANTOPRAZOLE SODIUM 40 MILLIGRAM(S): 20 TABLET, DELAYED RELEASE ORAL at 17:09

## 2020-09-15 RX ADMIN — APIXABAN 5 MILLIGRAM(S): 2.5 TABLET, FILM COATED ORAL at 17:09

## 2020-09-15 RX ADMIN — PANTOPRAZOLE SODIUM 40 MILLIGRAM(S): 20 TABLET, DELAYED RELEASE ORAL at 05:33

## 2020-09-15 RX ADMIN — Medication 100 MILLIGRAM(S): at 05:33

## 2020-09-15 NOTE — PROGRESS NOTE ADULT - SUBJECTIVE AND OBJECTIVE BOX
----------Daily Progress Note----------    HISTORY OF PRESENT ILLNESS:  Patient is a 64y old Female who presents with a chief complaint of pulmonary embolism (15 Sep 2020 14:58)    Currently admitted to medicine with the primary diagnosis of Pulmonary embolism       Today is hospital day 16d.     INTERVAL HOSPITAL COURSE / OVERNIGHT EVENTS:    Patient was examined and seen at bedside. This morning she is resting comfortably in bed and reports no new issues or overnight events.     Review of Systems: Otherwise unremarkable     <<<<<PAST MEDICAL & SURGICAL HISTORY>>>>>  HTN (hypertension)    Gastric ulcer    S/P tonsillectomy    S/P appendectomy      ALLERGIES  No Known Allergies    MEDICATIONS  STANDING MEDICATIONS  apixaban 5 milliGRAM(s) Oral every 12 hours  chlorhexidine 4% Liquid 1 Application(s) Topical <User Schedule>  levothyroxine 50 MICROGram(s) Oral daily  lisinopril 5 milliGRAM(s) Oral daily  metroNIDAZOLE  IVPB 500 milliGRAM(s) IV Intermittent every 8 hours  pantoprazole   Suspension 40 milliGRAM(s) Oral two times a day  senna 2 Tablet(s) Oral at bedtime  sodium chloride 0.9%. 1000 milliLiter(s) IV Continuous <Continuous>  sucralfate 1 Gram(s) Oral every 12 hours    PRN MEDICATIONS  acetaminophen  Suppository .. 650 milliGRAM(s) Rectal every 6 hours PRN  lidocaine   Patch 1 Patch Transdermal daily PRN  oxyCODONE    IR 10 milliGRAM(s) Oral three times a day PRN  polyethylene glycol 3350 17 Gram(s) Oral daily PRN    VITALS:  T(F): 97.9  HR: 84  BP: 121/71  RR: 18  SpO2: 91%    <<<<<PHYSICAL EXAM>>>>>  GENERAL: Well developed, well nourished and in no acute distress. Resting comfortably in bed.  HEENT: Normocephalic, atraumatic, mucous membrances moist, EOMI, PERRLA, bilateral sclera anicteric, no conjunctival injection  Neck: Supple, non-tender, no lymphadenopathy.  PULMONARY: Clear to auscultation bilaterally. No rales, ronchi, or wheezing.  CARDIOVASCULAR: Regular rate and rhythm, S1-S2, no murmurs  GASTROINTESTINAL: Soft, tender to palpation in RUQ, non-distended, no guarding. Bowel sounds present in all 4 quadrants. Right SUHAS drain <5 cc blood  SKIN/EXTREMITIES: No clubbing or edema  NEUROLOGIC/MUSCULOSKELETAL: AOx4, cranial nerves II-XII grossly intact. no focal deficits    <<<<<LABS>>>>>                        8.0    6.39  )-----------( 457      ( 15 Sep 2020 07:25 )             26.4     09-14    133<L>  |  98  |  14  ----------------------------<  90  4.0   |  23  |  0.7    Ca    8.1<L>      14 Sep 2020 06:55    TPro  5.7<L>  /  Alb  2.8<L>  /  TBili  0.2  /  DBili  x   /  AST  66<H>  /  ALT  45<H>  /  AlkPhos  106  09-14            <<<<<RADIOLOGY>>>>>    Assessment and Plan:   Patient is a 63 y/o woman with PMH of HTN, PUD, and hypothyroidism presenting for SOB. Found to have PE. Course complicated by necrosis of femoral head; cause unknown. Patient found to have hypoechogenicity on US of liver; suspicious for abscess. CT again revealed a foci suspicious for abscess.    #Pulmonary embolism s/p thrombectomy   -Had thrombectomy on 8/31  -Currently on room air, saturating well  -Eliquis restarted 09/12  -Continue to monitor    #Liver abscess  -Transaminitis with fevers Tmax 24hrs- 100.3  -AST/ALT - Downtrending   -CT scan with contrast performed showed a 6.1 x 4.9x 6.0 cm multiloculated structure in left hepatic lobe concerning for abscess - drained by IR  s/p 8.5 Lithuanian pigtail catheter 09/11. Approximately 20 ml of purulent fluid aspirated.   -Liver abscess cx growing Strep intermedius   -ID consulted: continue ceftriaxone 2g Q24, metronidazole 500 Q8  -Eliquis restarted  -Patient is requesting to go home to [facility], however IR and ID recommending 4-6 weeks treatment for abscess resolution and abx course. Pending case work recs.  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 with surrounding edema/inflammation with extensive joint effusion  -per ortho- If no evidence of infection, the pt should complete treatment for PE and undergo elective MARIA C at a later date   - s/p IR guided drainage: [id number] RBCs and 3351 nucleated cells (83% neutrophils)  - Blood Cx 9/3 NG, 9/5 BCX NGTD   - Hip Cx 9/4 NGTD  -PPD placed; negative; quantiferon - negative  -PT following     #CHF  -Fluid restriction 1.5L QD  -Daily weights  -I+O    #PUD  -EGD outpatient showing deep ulcer  -Protonix 40 BID  -Carafate 1gm BID  -CBC  -keep active Type and screen     #Iron Deficiency anemia s/p IV  Venofer 200mg 09/03/2020  -Monitor hemoglobin  -Transfuse if <7.5  -Hb 7.8 today     #Diarrhea  Pt got Miralax - held, monitor for the next 36 hours, if unresolving, perform cdiff PCR    #Monitor for Hyponatremia  - Ranges from 133-138    #HTN  -DASH  -Lisinopril    #Spinal stenosis  -PT/Rehab  -Pain control  -Fall precautions    Dispo: Pt needs PICC line but is travelling, reconsult ID to see if oral Abx will be sufficient

## 2020-09-15 NOTE — PROGRESS NOTE ADULT - SUBJECTIVE AND OBJECTIVE BOX
PROGRESS NOTE  Chief Complaint:  Patient is a 64y old  Female who presents with a chief complaint of pulmonary embolism (14 Sep 2020 17:21)      HPI:  The patient is a 64 year-old female with a PMH of HTN and hypothyroidism and a PSH of appendectomy and tonsillectomy presenting for shortness of breath. The patient reports that on Thursday she began experiencing shortness of breath on minimal ambulation associated w/ weakness and dizziness; she denied associated chest pain or pressure; as the shortness of breath worsened she presented to the ED today.    Of note, about 1 week ago she started having pain in the left hip; had an MRI done which showed spinal stenosis at L3-L4 and L4-L5; was given a "steroid shot" which did not help the pain. Around the same time the patient began complaining of nausea; started to have non-bloody non-bilious vomiting; had an EGD in [place] (records faxed to us, in chart) which revealed thickening of the gastric pylorus with a deep ulcer suspicious for perforation but with no free air; she was started on a PPI BID which she reports alleviated the nausea and vomiting.     In the ED, T 97.6 F, , /94, RR 19; O2 sat 94%. CT angio chest revealed bilateral central pulmonary embolism extending into subsegmental branches w/ evidence of right heart strain. She was started on a heparin gtt; IR was c/s and are planning to do a thrombectomy tomorrow in the AM. The patient reports no recent history of trauma or any history of blood clotting disorders either in herself or in her family. pert called started on heparin and protonix drip hb stable (30 Aug 2020 22:24)      ALLERGIES:  No Known Allergies      HOSPITAL MEDICATIONS:  MEDICATIONS  (STANDING):  apixaban 5 milliGRAM(s) Oral every 12 hours  chlorhexidine 4% Liquid 1 Application(s) Topical <User Schedule>  levothyroxine 50 MICROGram(s) Oral daily  lisinopril 5 milliGRAM(s) Oral daily  metroNIDAZOLE  IVPB 500 milliGRAM(s) IV Intermittent every 8 hours  pantoprazole   Suspension 40 milliGRAM(s) Oral two times a day  senna 2 Tablet(s) Oral at bedtime  sodium chloride 0.9%. 1000 milliLiter(s) (75 mL/Hr) IV Continuous <Continuous>  sucralfate 1 Gram(s) Oral every 12 hours    MEDICATIONS  (PRN):  acetaminophen  Suppository .. 650 milliGRAM(s) Rectal every 6 hours PRN Temp greater or equal to 38C (100.4F)  lidocaine   Patch 1 Patch Transdermal daily PRN chest pain  oxyCODONE    IR 10 milliGRAM(s) Oral three times a day PRN Severe Pain (7 - 10)  polyethylene glycol 3350 17 Gram(s) Oral daily PRN Constipation      PMHX/PSHX:  Diabetes    HTN (hypertension)    Gastric ulcer    S/P tonsillectomy    S/P appendectomy        FAMILY HISTORY:  Family history of early CAD        SOCIAL HISTORY:    REVIEW OF SYSTEMS:     General:  No wt loss, fevers, chills, night sweats, fatigue,   Eyes:  Good vision, no reported pain  ENT:  No sore throat, pain, runny nose, dysphagia  CV:  No pain, palpitations, hypo/hypertension  Resp:  No dyspnea, cough, tachypnea, wheezing  GI:  No pain, No nausea, No vomiting, No diarrhea, No constipation, No weight loss, No fever, No pruritis, No rectal bleeding, No tarry stools, No dysphagia,  :  No pain, bleeding, incontinence, nocturia  Muscle:  No pain, weakness  Neuro:  No weakness, tingling, memory problems  Psych:  No fatigue, insomnia, mood problems, depression  Endocrine:  No polyuria, polydipsia, cold/heat intolerance  Heme:  No petechiae, ecchymosis, easy bruisability  Skin:  No rash, tattoos, scars, edema      PHYSICAL EXAM:   Vital Signs:  Vital Signs Last 24 Hrs  T(C): 36.6 (15 Sep 2020 13:53), Max: 37.1 (15 Sep 2020 05:15)  T(F): 97.9 (15 Sep 2020 13:53), Max: 98.7 (15 Sep 2020 05:15)  HR: 84 (15 Sep 2020 13:53) (84 - 95)  BP: 121/71 (15 Sep 2020 13:53) (121/71 - 145/72)  BP(mean): --  RR: 18 (15 Sep 2020 13:53) (18 - 18)  SpO2: 91% (15 Sep 2020 09:18) (91% - 94%)  Daily     Daily Weight in k.7 (15 Sep 2020 05:15)    GENERAL:  Appears stated age, well-groomed, well-nourished, no distress  HEENT:  NC/AT,  conjunctivae clear and pink, no thyromegaly, nodules, adenopathy, no JVD, sclera -anicteric  CHEST:  Full & symmetric excursion, no increased effort, breath sounds clear  HEART:  Regular rhythm, S1, S2, no murmur/rub/S3/S4, no abdominal bruit, no edema  ABDOMEN:  Soft, non-tender, non-distended, normoactive bowel sounds,  no masses ,no hepato-splenomegaly, no signs of chronic liver disease. SUHAS DRAIN IN PLACE, MINIMUM DRAINAGE   EXTEREMITIES:  no cyanosis,clubbing or edema  SKIN:  No rash/erythema/ecchymoses/petechiae/wounds/abscess/warm/dry  NEURO:  Alert, oriented, no asterixis, no tremor, no encephalopathy    LABS:                        8.0    6.39  )-----------( 457      ( 15 Sep 2020 07:25 )             26.4     09-    133<L>  |  98  |  14  ----------------------------<  90  4.0   |  23  |  0.7    Ca    8.1<L>      14 Sep 2020 06:55    TPro  5.7<L>  /  Alb  2.8<L>  /  TBili  0.2  /  DBili  x   /  AST  66<H>  /  ALT  45<H>  /  AlkPhos  106  09-14    LIVER FUNCTIONS - ( 14 Sep 2020 06:55 )  Alb: 2.8 g/dL / Pro: 5.7 g/dL / ALK PHOS: 106 U/L / ALT: 45 U/L / AST: 66 U/L / GGT: x               ASSESSMENT & PLAN:

## 2020-09-15 NOTE — PROGRESS NOTE ADULT - ASSESSMENT
Patient is a 65 y/o woman with PMH of HTN, PUD, and hypothyroidism presenting for SOB. Found to have PE. Course complicated by necrosis of femoral head; cause unknown. Patient found to have hypoechogenicity on US of liver; suspicious for abscess. CT again revealed a foci suspicious for abscess.    #Pulmonary embolism s/p thrombectomy   -Had thrombectomy on 8/31  -Currently on room air, saturating well  -Eliquis restarted 09/12  -Continue to monitor    #Liver abscess  -Transaminitis with fevers Tmax 24hrs- 100.3  -AST/ALT - Downtrending   -CT scan with contrast performed showed a 6.1 x 4.9x 6.0 cm multiloculated structure in left hepatic lobe concerning for abscess - drained by IR  s/p 8.5 Croatian pigtail catheter 09/11. Approximately 20 ml of purulent fluid aspirated.   -Liver abscess cx growing Strep intermedius   -ID consulted: continue ceftriaxone 2g Q24, metronidazole 500 Q8  -Eliquis restarted  -Patient is requesting to go [facility] to [facility], however IR and ID recommending 4-6 weeks treatment for abscess resolution and abx course. Pending case work recs.  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 with surrounding edema/inflammation with extensive joint effusion  -per ortho- If no evidence of infection, the pt should complete treatment for PE and undergo elective MARIA C at a later date   - s/p IR guided drainage: 379475 RBCs and 3351 nucleated cells (83% neutrophils)  - Blood Cx 9/3 NG, 9/5 BCX NGTD   - Hip Cx 9/4 NGTD  -PPD placed; negative; quantiferon - negative  -PT following     #CHF  -Fluid restriction 1.5L QD  -Daily weights  -I+O    #PUD  -EGD outpatient showing deep ulcer  -Protonix 40 BID  -Carafate 1gm BID  -CBC  -keep active Type and screen     #Iron Deficiency anemia s/p IV  Venofer 200mg 09/03/2020  -Monitor hemoglobin  -Transfuse if <7.5  -Hb 7.8 today     #Diarrhea  Pt got Miralax - held, monitor for the next 36 hours, if unresolving, perform cdiff PCR    #Monitor for Hyponatremia  - Ranges from 133-138    #HTN  -DASH  -Lisinopril    #Spinal stenosis  -PT/Rehab  -Pain control  -Fall precautions    DiSPO - Follow up repeat CT abdomen tomorrow to determine on the stacie drain. Need arrangement for the IV antibiotics for 6 weeks

## 2020-09-15 NOTE — DISCHARGE NOTE NURSING/CASE MANAGEMENT/SOCIAL WORK - PATIENT PORTAL LINK FT
You can access the FollowMyHealth Patient Portal offered by [facility] by registering at the following website: [email or web address]. By joining [employer]’s FollowMyHealth portal, you will also be able to view your health information using other applications (apps) compatible with our system.

## 2020-09-16 LAB
ALBUMIN SERPL ELPH-MCNC: 2.8 G/DL — LOW (ref 3.5–5.2)
ALP SERPL-CCNC: 100 U/L — SIGNIFICANT CHANGE UP (ref 30–115)
ALT FLD-CCNC: 33 U/L — SIGNIFICANT CHANGE UP (ref 0–41)
ANION GAP SERPL CALC-SCNC: 10 MMOL/L — SIGNIFICANT CHANGE UP (ref 7–14)
AST SERPL-CCNC: 39 U/L — SIGNIFICANT CHANGE UP (ref 0–41)
BASOPHILS # BLD AUTO: 0.02 K/UL — SIGNIFICANT CHANGE UP (ref 0–0.2)
BASOPHILS NFR BLD AUTO: 0.4 % — SIGNIFICANT CHANGE UP (ref 0–1)
BILIRUB SERPL-MCNC: 0.7 MG/DL — SIGNIFICANT CHANGE UP (ref 0.2–1.2)
BUN SERPL-MCNC: 13 MG/DL — SIGNIFICANT CHANGE UP (ref 10–20)
CALCIUM SERPL-MCNC: 8.1 MG/DL — LOW (ref 8.5–10.1)
CHLORIDE SERPL-SCNC: 103 MMOL/L — SIGNIFICANT CHANGE UP (ref 98–110)
CO2 SERPL-SCNC: 26 MMOL/L — SIGNIFICANT CHANGE UP (ref 17–32)
CREAT SERPL-MCNC: 0.7 MG/DL — SIGNIFICANT CHANGE UP (ref 0.7–1.5)
CULTURE RESULTS: SIGNIFICANT CHANGE UP
EOSINOPHIL # BLD AUTO: 0.1 K/UL — SIGNIFICANT CHANGE UP (ref 0–0.7)
EOSINOPHIL NFR BLD AUTO: 1.9 % — SIGNIFICANT CHANGE UP (ref 0–8)
GLUCOSE SERPL-MCNC: 94 MG/DL — SIGNIFICANT CHANGE UP (ref 70–99)
HCT VFR BLD CALC: 26.9 % — LOW (ref 37–47)
HGB BLD-MCNC: 8 G/DL — LOW (ref 12–16)
IMM GRANULOCYTES NFR BLD AUTO: 1.1 % — HIGH (ref 0.1–0.3)
LYMPHOCYTES # BLD AUTO: 1.72 K/UL — SIGNIFICANT CHANGE UP (ref 1.2–3.4)
LYMPHOCYTES # BLD AUTO: 32.2 % — SIGNIFICANT CHANGE UP (ref 20.5–51.1)
MAGNESIUM SERPL-MCNC: 1.9 MG/DL — SIGNIFICANT CHANGE UP (ref 1.8–2.4)
MCHC RBC-ENTMCNC: 28.1 PG — SIGNIFICANT CHANGE UP (ref 27–31)
MCHC RBC-ENTMCNC: 29.7 G/DL — LOW (ref 32–37)
MCV RBC AUTO: 94.4 FL — SIGNIFICANT CHANGE UP (ref 81–99)
MONOCYTES # BLD AUTO: 0.52 K/UL — SIGNIFICANT CHANGE UP (ref 0.1–0.6)
MONOCYTES NFR BLD AUTO: 9.7 % — HIGH (ref 1.7–9.3)
NEUTROPHILS # BLD AUTO: 2.92 K/UL — SIGNIFICANT CHANGE UP (ref 1.4–6.5)
NEUTROPHILS NFR BLD AUTO: 54.7 % — SIGNIFICANT CHANGE UP (ref 42.2–75.2)
NRBC # BLD: 0 /100 WBCS — SIGNIFICANT CHANGE UP (ref 0–0)
ORGANISM # SPEC MICROSCOPIC CNT: SIGNIFICANT CHANGE UP
ORGANISM # SPEC MICROSCOPIC CNT: SIGNIFICANT CHANGE UP
PLATELET # BLD AUTO: 521 K/UL — HIGH (ref 130–400)
POTASSIUM SERPL-MCNC: 4.2 MMOL/L — SIGNIFICANT CHANGE UP (ref 3.5–5)
POTASSIUM SERPL-SCNC: 4.2 MMOL/L — SIGNIFICANT CHANGE UP (ref 3.5–5)
PROT SERPL-MCNC: 5.7 G/DL — LOW (ref 6–8)
RBC # BLD: 2.85 M/UL — LOW (ref 4.2–5.4)
RBC # FLD: 13.2 % — SIGNIFICANT CHANGE UP (ref 11.5–14.5)
SODIUM SERPL-SCNC: 139 MMOL/L — SIGNIFICANT CHANGE UP (ref 135–146)
SPECIMEN SOURCE: SIGNIFICANT CHANGE UP
WBC # BLD: 5.34 K/UL — SIGNIFICANT CHANGE UP (ref 4.8–10.8)
WBC # FLD AUTO: 5.34 K/UL — SIGNIFICANT CHANGE UP (ref 4.8–10.8)

## 2020-09-16 PROCEDURE — 99233 SBSQ HOSP IP/OBS HIGH 50: CPT

## 2020-09-16 RX ORDER — METRONIDAZOLE 500 MG
1 TABLET ORAL
Qty: 108 | Refills: 0
Start: 2020-09-16 | End: 2020-10-21

## 2020-09-16 RX ORDER — AMOXICILLIN 250 MG/5ML
2 SUSPENSION, RECONSTITUTED, ORAL (ML) ORAL
Qty: 216 | Refills: 0
Start: 2020-09-16 | End: 2020-10-21

## 2020-09-16 RX ORDER — CEFTRIAXONE 500 MG/1
2000 INJECTION, POWDER, FOR SOLUTION INTRAMUSCULAR; INTRAVENOUS EVERY 24 HOURS
Refills: 0 | Status: DISCONTINUED | OUTPATIENT
Start: 2020-09-16 | End: 2020-09-17

## 2020-09-16 RX ADMIN — Medication 100 MILLIGRAM(S): at 05:19

## 2020-09-16 RX ADMIN — LISINOPRIL 5 MILLIGRAM(S): 2.5 TABLET ORAL at 05:20

## 2020-09-16 RX ADMIN — PANTOPRAZOLE SODIUM 40 MILLIGRAM(S): 20 TABLET, DELAYED RELEASE ORAL at 18:01

## 2020-09-16 RX ADMIN — Medication 100 MILLIGRAM(S): at 14:00

## 2020-09-16 RX ADMIN — PANTOPRAZOLE SODIUM 40 MILLIGRAM(S): 20 TABLET, DELAYED RELEASE ORAL at 05:20

## 2020-09-16 RX ADMIN — Medication 1 GRAM(S): at 18:01

## 2020-09-16 RX ADMIN — CEFTRIAXONE 100 MILLIGRAM(S): 500 INJECTION, POWDER, FOR SOLUTION INTRAMUSCULAR; INTRAVENOUS at 11:49

## 2020-09-16 RX ADMIN — APIXABAN 5 MILLIGRAM(S): 2.5 TABLET, FILM COATED ORAL at 05:20

## 2020-09-16 RX ADMIN — Medication 50 MICROGRAM(S): at 05:20

## 2020-09-16 RX ADMIN — Medication 100 MILLIGRAM(S): at 21:29

## 2020-09-16 RX ADMIN — Medication 1 GRAM(S): at 05:19

## 2020-09-16 RX ADMIN — APIXABAN 5 MILLIGRAM(S): 2.5 TABLET, FILM COATED ORAL at 18:01

## 2020-09-16 NOTE — OCCUPATIONAL THERAPY INITIAL EVALUATION ADULT - GENERAL OBSERVATIONS, REHAB EVAL
#[id number] was used throughout OT evaluation. Pt encountered semi-lindquist in bed +SUHAS drain, +IV lock, in NAD. Pt was left seated in bed side chair +call bell, +chair alarm, +SUHAS drain, +IV lock in NAD.

## 2020-09-16 NOTE — PROGRESS NOTE ADULT - ASSESSMENT
ASSESSMENT  64yFemale with a PMH of hypertension, peptic ulcer disease, hypothyroidism admitted to hospital for submassive PE s/p thrombectomy of 4 clots. Patient complaining of left hip pain, underwent MRI revealing extensive necrosis of femoral head and acetabulum and joint effusion, now s/p image guided arthrocentesis by IR revealing purulent fluid high in RBCs w moderate WBCs. Patient started on ceftriaxone 2000mg IV q24hr. WBC trending down since admission    IMPRESSION  # Liver abscess    s/p 9/11 Successful placement of a left hepatic lobe abscess 8.5 Maori pigtail catheter. Approximately 20 ml of purulent fluid aspirated  < from: CT Abdomen and Pelvis w/ IV Cont (09.08.20 @ 22:42) >  6.1 x 4.9 x 6.0 cm multiloculated hypoattenuating structure in the left hepatic lobe, compatible with an abscess in the appropriate clinical setting.     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 with surrounding edema/inflammation with extensive joint effusion  # Low suspicion for septic arthritis s/p image guided arthrocentesis by IR revealing 5cc "purulent" fluid with high level of RBCs and moderate WBCs  - s/p IR guided drainage: [id number] RBCs and 3351 nucleated cells (83% neutrophils)  - Blood Cx 9/3 NG, 9/5 BCX NGTD   - Hip Cx 9/4 NGTD  - Quantiferon NEGATIVE   - TTE 8/31 negative for vegetation   #PE       RECOMMENDATIONS  - Recommend IV therapy for 6 weeks with ceftriaxone 2g daily + flagyl   - if patient not accepting PICC line, can plan for PO amox 1g TID and PO flagyl 500 mg q 8 hours for 6 weeks; discussed with patient that this is suboptimal therapy and can lead to worsening infection, sepsis and death     Please call with any questions or send a message on [employer] Teams  Spectra [phone]

## 2020-09-16 NOTE — PROGRESS NOTE ADULT - SUBJECTIVE AND OBJECTIVE BOX
----------Daily Progress Note----------    HISTORY OF PRESENT ILLNESS:  Patient is a 64y old Female who presents with a chief complaint of pulmonary embolism (15 Sep 2020 16:54)    Currently admitted to medicine with the primary diagnosis of Pulmonary embolism       Today is hospital day 17d.     INTERVAL HOSPITAL COURSE / OVERNIGHT EVENTS:    Patient was examined and seen at bedside. This morning she is resting comfortably in bed and reports no new issues or overnight events.     Review of Systems: Otherwise unremarkable     <<<<<PAST MEDICAL & SURGICAL HISTORY>>>>>  HTN (hypertension)    Gastric ulcer    S/P tonsillectomy    S/P appendectomy      ALLERGIES  No Known Allergies    MEDICATIONS  STANDING MEDICATIONS  apixaban 5 milliGRAM(s) Oral every 12 hours  cefTRIAXone   IVPB 2000 milliGRAM(s) IV Intermittent every 24 hours  chlorhexidine 4% Liquid 1 Application(s) Topical <User Schedule>  levothyroxine 50 MICROGram(s) Oral daily  lisinopril 5 milliGRAM(s) Oral daily  metroNIDAZOLE  IVPB 500 milliGRAM(s) IV Intermittent every 8 hours  pantoprazole   Suspension 40 milliGRAM(s) Oral two times a day  senna 2 Tablet(s) Oral at bedtime  sucralfate 1 Gram(s) Oral every 12 hours    PRN MEDICATIONS  acetaminophen  Suppository .. 650 milliGRAM(s) Rectal every 6 hours PRN  lidocaine   Patch 1 Patch Transdermal daily PRN  oxyCODONE    IR 10 milliGRAM(s) Oral three times a day PRN  polyethylene glycol 3350 17 Gram(s) Oral daily PRN    VITALS:  T(F): 99.1  HR: 89  BP: 143/73  RR: 18  SpO2: 92%    <<<<<PHYSICAL EXAM>>>>>  GENERAL: Well developed, well nourished and in no acute distress. Resting comfortably in bed.  HEENT: Normocephalic, atraumatic, mucous membrances moist  Neck: Supple, non-tender, no lymphadenopathy.  PULMONARY: Clear to auscultation bilaterally. No rales, ronchi, or wheezing.  CARDIOVASCULAR: Regular rate and rhythm, S1-S2, no murmurs  GASTROINTESTINAL: Soft, tender to palpation in RUQ, non-distended, no guarding. Bowel sounds present in all 4 quadarants.  SKIN/EXTREMITIES: No clubbing or edema. SUHAS drain on R side draining 5 cc sanguinous fluid  NEUROLOGIC/MUSCULOSKELETAL: AOx3, moving all extremities    <<<<<LABS>>>>>                        8.0    5.34  )-----------( 521      ( 16 Sep 2020 05:46 )             26.9     09-16    139  |  103  |  13  ----------------------------<  94  4.2   |  26  |  0.7    Ca    8.1<L>      16 Sep 2020 05:46  Mg     1.9     09-16    TPro  5.7<L>  /  Alb  2.8<L>  /  TBili  0.7  /  DBili  x   /  AST  39  /  ALT  33  /  AlkPhos  100  09-16      <<<<<RADIOLOGY>>>>>    Assessment and Plan:   Patient is a 65 y/o woman with PMH of HTN, PUD, and hypothyroidism presenting for SOB. Found to have PE. Course complicated by necrosis of femoral head; cause unknown. Patient found to have hypoechogenicity on US of liver; suspicious for abscess. CT again revealed a foci suspicious for abscess.    #Pulmonary embolism s/p thrombectomy 8/31  - Currently on room air, saturating well  - Continue Eliquis 5 mg PO Q12H  - Continue to monitor    #Liver abscess w/ transaminitis  - Afebrile in past 24 hours  - AST/ALT - Downtrending   - Plts 521 likely reactive thrombocytosis  - CT Abdomen and Pelvis w/ IV Cont (09.15.20 @ 13:45) >  1.  Interval placement of drainage catheter into left hepatic lobe abscess with slight interval decrease in size of abscess.  2.  New findings of duodenitis, possibly reactive. 1.2 cm periduodenal fluid loculation/collection, new from prior.  3.  Redemonstration of findings of known left hip septic arthritis.  - Liver abscess cx growing Strep intermedius   - ID consulted: continue IV ceftriaxone 2g Q24, metronidazole 500 Q8  - IR consulted for possible removal of drain due to <5cc output for 24 hours  - Patient unable to receive IV abx, ID recommended PO abx cefpodoxime 200 mg b.i.d. 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 with surrounding edema/inflammation with extensive joint effusion  - per ortho- If no evidence of infection, the pt should complete treatment for PE and undergo elective MARIA C at a later date   - Blood Cx 9/3 NG, 9/5 BCX NGTD   - Hip Cx 9/4 NGTD  - PPD placed; negative; quantiferon - negative  - PT/OT following, partial weight bearing status     #CHF  - Fluid restriction 1.5L QD  - Daily weights  - I+O    #PUD  - EGD outpatient showing deep ulcer   - Protonix 40 BID  - Carafate 1gm BID  - Monitor CBC, keep active Type and screen     #Iron Deficiency anemia s/p IV Venofer 200mg 09/03/2020  - Monitor hemoglobin  - Transfuse if <7.5  - Hb 8.0 today     #Monitor for Hyponatremia  - Ranges from 133-138  - Today 139  - Continue to monitor    #HTN  - continue Lisinopril 5 mg PO daily    #Spinal stenosis  - no surgical intervention at this time  - PT/Rehab  - Pain control, lidocaine patch  - Fall precautions    #Diarrhea-resolved    GI ppx: protonix  DVT ppx: eliquis  Diet: Regular  Activity: advance as tolerated  Code status: Full

## 2020-09-16 NOTE — PROGRESS NOTE ADULT - SUBJECTIVE AND OBJECTIVE BOX
Pt seen at bedside. SUHAS drain had ~3cc purulent fluid.   Per RN, drained ~5cc last 24hr.  Pt HD stable, afebrile, no leukocytosis.   Rec leaving drain in place and follow up with IR clinic in 4-6 weeks.

## 2020-09-16 NOTE — PROGRESS NOTE ADULT - PROVIDER SPECIALTY LIST ADULT
Internal Medicine Notification received from [facility]  regarding Pt  Pt location at SN[facility]: AT 2 wing    Assessment: Pt had PT/INR drawn  Results:  INR: 2.76  PT: 31.0    Current dose of Coumadin: 5mg on Mondays, 2.5 ROW  Last INR: 2.5  1/6/2020    Any bleeding or bruising concerns: none  Any medication changes: none    Anticoag tracking form updated with result    Any recommendations or new orders?

## 2020-09-16 NOTE — PROGRESS NOTE ADULT - ASSESSMENT
Patient is a 65 y/o woman with PMH of HTN, PUD, and hypothyroidism presenting for SOB. Found to have PE. Course complicated by necrosis of femoral head; cause unknown. Patient found to have hypoechogenicity on US of liver; she was diagnosed with a liver  abscess which was drained by IR.   Pt it on IV Abx , needs 6 weeks of Abx.       #Pulmonary embolism s/p thrombectomy   -Had thrombectomy on 8/31  - on Eliquis restarted 09/12  - stable on RA now     #Liver abscess  - s/p drainage, pt has minimal output for last 24 hours   - repeat CT findings noted, IR follow up for possible drainage removal   -Liver abscess cx growing Strep intermedius   -ID consulted: continue ceftriaxone 2g Q24, metronidazole 500 Q8, pt needs 6 weeks of IV Abx, in case of pt is not able to afford meds will consider po alternatives : Amoxicillin 1 gm TID and Flagyl 500 mg TID for 6 weeks ( starting from the day of drainage)   - monitor LFTs     #L Femoral head necrosis   -per ortho : pt needs  elective MARIA C when medically cleared   - s/p IR guided drainage  - Blood Cx 9/3 NG, 9/5 BCX NGTD   - Hip Cx 9/4 NGTD  - PT as tolerated       #PUD/ Duodenitis   -EGD outpatient showing deep ulcer  -Protonix 40 BID  -Carafate 1gm BID  - c/w Abx as per ID     #Iron Deficiency anemia s/p IV  Venofer 200mg 09/03/2020  -Monitor hemoglobin  -Transfuse if <7.5  - keep type and cross active     #HTN  -DASH  - on Lisinopril    #Spinal stenosis  -PT/Rehab  -Pain control  -Fall precautions    # Hypothyroids   - c/w Synthroid     #Progress Note Handoff  Pending (specify):  IR follow up for drainage removal, monitor LFTs   Family discussion: will contact family, plan of care discussed with pt at length   Disposition: Home_x __/SNF___/Other________/Unknown at this time_______

## 2020-09-16 NOTE — OCCUPATIONAL THERAPY INITIAL EVALUATION ADULT - ADDITIONAL COMMENTS
Pt was unclear on prior level of function however  she verbalized that before having the hip pain that she was able to do everything independently. Pt reports that in the house that she is staying there is a +Tub and no DME.

## 2020-09-16 NOTE — PROGRESS NOTE ADULT - SUBJECTIVE AND OBJECTIVE BOX
IOANA POTTER  64y, Female  Allergy: No Known Allergies      LOS  17d    CHIEF COMPLAINT: pulmonary embolism (16 Sep 2020 13:27)      INTERVAL EVENTS/HPI  - No acute events overnight  - T(F): , Max: 99.1 (09-16-20 @ 04:55)  - Denies any worsening symptoms  - Tolerating medication  - WBC Count: 5.34 (09-16-20 @ 05:46)  WBC Count: 6.39 (09-15-20 @ 07:25)  - Creatinine, Serum: 0.7 (09-16-20 @ 05:46)       ROS  General: Denies rigors, nightsweats  HEENT: Denies headache, rhinorrhea, sore throat, eye pain  CV: Denies CP, palpitations  PULM: Denies wheezing, hemoptysis  GI: Denies hematemesis, hematochezia, melena  : Denies discharge, hematuria  MSK: Denies arthralgias, myalgias  SKIN: Denies rash, lesions  NEURO: Denies paresthesias, weakness  PSYCH: Denies depression, anxiety    VITALS:  T(F): 96.7, Max: 99.1 (09-16-20 @ 04:55)  HR: 89  BP: 132/77  RR: 18Vital Signs Last 24 Hrs  T(C): 35.9 (16 Sep 2020 12:18), Max: 37.3 (16 Sep 2020 04:55)  T(F): 96.7 (16 Sep 2020 12:18), Max: 99.1 (16 Sep 2020 04:55)  HR: 89 (16 Sep 2020 12:18) (89 - 89)  BP: 132/77 (16 Sep 2020 12:18) (122/62 - 143/73)  BP(mean): --  RR: 18 (16 Sep 2020 12:18) (18 - 18)  SpO2: 92% (16 Sep 2020 09:20) (92% - 92%)    PHYSICAL EXAM:  Gen: NAD, resting in bed  HEENT: Normocephalic, atraumatic  Neck: supple, no lymphadenopathy  CV: Regular rate & regular rhythm  Lungs: decreased BS at bases, no fremitus  Abdomen: Soft, BS present, SUHAS  Ext: Warm, well perfused  Neuro: non focal, awake  Skin: no rash, no erythema  Lines: no phlebitis    FH: Non-contributory  Social Hx: Non-contributory    TESTS & MEASUREMENTS:                        8.0    5.34  )-----------( 521      ( 16 Sep 2020 05:46 )             26.9     09-16    139  |  103  |  13  ----------------------------<  94  4.2   |  26  |  0.7    Ca    8.1<L>      16 Sep 2020 05:46  Mg     1.9     09-16    TPro  5.7<L>  /  Alb  2.8<L>  /  TBili  0.7  /  DBili  x   /  AST  39  /  ALT  33  /  AlkPhos  100  09-16    eGFR if Non African American: 90 mL/min/1.73M2 (09-16-20 @ 05:46)  eGFR if African American: 105 mL/min/1.73M2 (09-16-20 @ 05:46)    LIVER FUNCTIONS - ( 16 Sep 2020 05:46 )  Alb: 2.8 g/dL / Pro: 5.7 g/dL / ALK PHOS: 100 U/L / ALT: 33 U/L / AST: 39 U/L / GGT: x               Culture - Acid Fast - Other w/Smear (collected 09-10-20 @ 16:20)  Source: .Other Other, Liver Abscess  Preliminary Report (09-16-20 @ 15:04):    Culture is being performed.    Culture - Abscess with Gram Stain (collected 09-10-20 @ 16:20)  Source: .Abscess Liver Abscess  Preliminary Report (09-13-20 @ 20:11):    Few Streptococcus intermedius "Susceptibilities not performed"  Organism: Streptococcus intermedius (09-14-20 @ 17:13)  Organism: Streptococcus intermedius (09-14-20 @ 17:13)      Method Type: HAKAN    Culture - Blood (collected 09-10-20 @ 11:32)  Source: .Blood None  Final Report (09-15-20 @ 22:01):    No Growth Final    Culture - Blood (collected 09-06-20 @ 06:53)  Source: .Blood None  Final Report (09-11-20 @ 18:00):    No Growth Final    Culture - Blood (collected 09-05-20 @ 11:42)  Source: .Blood None  Final Report (09-10-20 @ 19:00):    No Growth Final    Culture - Body Fluid with Gram Stain (collected 09-04-20 @ 10:30)  Source: .Body Fluid Joint Fluid, Left Hip  Gram Stain (09-04-20 @ 22:29):    No polymorphonuclear cells seen    No organisms seen  Preliminary Report (09-05-20 @ 17:11):    No growth    Culture - Blood (collected 09-03-20 @ 20:52)  Source: .Blood None  Final Report (09-09-20 @ 04:01):    No Growth Final    Culture - Urine (collected 09-03-20 @ 18:00)  Source: .Urine Catheterized  Final Report (09-05-20 @ 20:47):    <10,000 CFU/mL Normal Urogenital Julianen    Culture - Urine (collected 09-03-20 @ 01:30)  Source: .Urine Clean Catch (Midstream)  Final Report (09-05-20 @ 18:36):    >100,000 CFU/ml Escherichia coli  Organism: Escherichia coli (09-05-20 @ 18:36)  Organism: Escherichia coli (09-05-20 @ 18:36)      -  Amikacin: S <=16      -  Amoxicillin/Clavulanic Acid: S <=8/4      -  Ampicillin: R >16 These ampicillin results predict results for amoxicillin      -  Ampicillin/Sulbactam: I 16/8 Enterobacter, Citrobacter, and Serratia may develop resistance during prolonged therapy (3-4 days)      -  Aztreonam: S <=4      -  Cefazolin: S <=2 (MIC_CL_[id number]) For uncomplicated UTI with K. pneumoniae, E. coli, or P. mirablis: HAKAN <=16 is sensitive and HAKAN >=32 is resistant. This also predicts results for oral agents cefaclor, cefdinir, cefpodoxime, cefprozil, cefuroxime axetil, cephalexin and locarbef for uncomplicated UTI. Note that some isolates may be susceptible to these agents while testing resistant to cefazolin.      -  Cefepime: S <=2      -  Cefoxitin: S <=8      -  Ceftriaxone: S <=1 Enterobacter, Citrobacter, and Serratia may develop resistance during prolonged therapy      -  Ciprofloxacin: S <=0.25      -  Ertapenem: S <=0.5      -  Gentamicin: S <=2      -  Imipenem: S <=1      -  Levofloxacin: S <=0.5      -  Meropenem: S <=1      -  Nitrofurantoin: S <=32 Should not be used to treat pyelonephritis      -  Piperacillin/Tazobactam: S <=8      -  Tigecycline: S <=2      -  Tobramycin: S <=2      -  Trimethoprim/Sulfamethoxazole: R >2/38      Method Type: [facility]            INFECTIOUS DISEASES TESTING  Procalcitonin, Serum: 0.23 (09-09-20 @ 06:18)  COVID-19 PCR: NotDetec (09-08-20 @ 10:02)  MRSA PCR Result.: Negative (09-05-20 @ 17:00)  COVID-19 PCR: NotDetec (08-30-20 @ 16:49)      INFLAMMATORY MARKERS      RADIOLOGY & ADDITIONAL TESTS:  I have personally reviewed the last available Chest xray  CXR      CT  CT Abdomen and Pelvis w/ IV Cont:   EXAM:  CT ABDOMEN AND PELVIS IC            PROCEDURE DATE:  09/15/2020            INTERPRETATION:  CLINICAL HISTORY: Follow-up of liver abscess.    TECHNIQUE: Contiguous axial CT images were obtained from the lower chest to the pubic symphysis following administration of Optiray intravenous contrast. Oral contrast was not administered. Reformatted images in the coronal and sagittal planes were acquired.    COMPARISON: CT abdomen pelvis dated 9/8/2020.      FINDINGS:    LOWER CHEST: Bibasilar atelectasis. Trace right pleural effusion.    HEPATOBILIARY: Interval placement of percutaneous drainage catheter into multiloculated left hepatic lobe collection. The collection now measures 5.3 x 4.3 cm previously 6.1 x 4.9 cm. Nonspecific gallbladderwall edema. No significant biliary ductal dilatation.    SPLEEN: Unremarkable.    PANCREAS: Unremarkable.    ADRENAL GLANDS: Unremarkable.    KIDNEYS: No hydronephrosis bilaterally.    ABDOMINOPELVIC NODES: No lymphadenopathy.    PELVIC ORGANS: Vaginal pessary noted.    PERITONEUM/MESENTERY/BOWEL: New duodenal wall thickening with surrounding edema, may be reactive. 1.2 cm periduodenal fluid loculation/collection (4/146), new from prior. No evidence of bowel obstruction or free air.     BONES/SOFT TISSUES: Redemonstration of extensive destructive changes of the left femoral head as with surrounding inflammatory change in this patient with septic arthritis. Retained hyperdense material in the left hip joint likely in part from recent arthrocentesis. Degenerative changes of the spine    OTHER: Circumaortic left renal vein. Atherosclerotic calcifications of the aorta.      IMPRESSION:    1.  Interval placement of drainage catheter into left hepatic lobe abscess with slight interval decrease in size of abscess.  2.  New findings of duodenitis, possibly reactive. 1.2 cm periduodenal fluid loculation/collection, new from prior.  3.  Redemonstration of findings of known left hip septic arthritis.                MARTINEZ PAGAN M.D., ATTENDING RADIOLOGIST  This document has been electronically signed. Sep 15 2020  2:18PM (09-15-20 @ 13:45)      CARDIOLOGY TESTING  12 Lead ECG:   Ventricular Rate 91 BPM    Atrial Rate 91 BPM    P-R Interval 128 ms    QRS Duration 72 ms    Q-T Interval 348 ms    QTC Calculation(Bezet) 428 ms    P Axis 40 degrees    R Axis -23 degrees    T Axis 11 degrees    Diagnosis Line Normal sinus rhythm  Possible Left atrial enlargement  Low voltage QRS  Possible Lateral infarct , age undetermined  Abnormal ECG    Confirmed by ALDA KRAMER MD ([id number]) on 9/7/2020 11:16:38 PM (09-05-20 @ 13:42)      MEDICATIONS  apixaban 5 Oral every 12 hours  cefTRIAXone   IVPB 2000 IV Intermittent every 24 hours  chlorhexidine 4% Liquid 1 Topical <User Schedule>  levothyroxine 50 Oral daily  lisinopril 5 Oral daily  metroNIDAZOLE  IVPB 500 IV Intermittent every 8 hours  pantoprazole   Suspension 40 Oral two times a day  senna 2 Oral at bedtime  sucralfate 1 Oral every 12 hours      WEIGHT  Weight (kg): 87.3 (09-01-20 @ 15:49)  Creatinine, Serum: 0.7 mg/dL (09-16-20 @ 05:46)      ANTIBIOTICS:  cefTRIAXone   IVPB 2000 milliGRAM(s) IV Intermittent every 24 hours  metroNIDAZOLE  IVPB 500 milliGRAM(s) IV Intermittent every 8 hours      All available historical records have been reviewed

## 2020-09-16 NOTE — PROGRESS NOTE ADULT - SUBJECTIVE AND OBJECTIVE BOX
64 year-old female with a PMH of PUD, HTN and hypothyroidism and a PSH of appendectomy and tonsillectomy presenting for shortness of breath, weakness and dizziness. The patient was found to have submassive pulmonary embolism and underwent thrombectomy on 8/31/20 with IR. The patient was anticoagulated with Heparin ggt. She was followed by GI for PUD.  Pt was downgraded to telemetry from ICU on 9/1/20. While in the hospital pt was diagnosed with iron deficiency amenia.   MRI of the left hip showed significant joint effusion, case discussed with ortho attending, IR consulted , pt underwent arthrocentesis. .  While in the hospital pt is spiking fever, she was diagnosed with a liver abscess which was drained by IR, pt is on IV Abx now, repeat CT abdomen was significant for duodenitis. Case d/w ID attending today, pt needs 6 weeks of IV Abx, pt is uninsured, will consider po alternatives on discharge, po Amoxicillin 1 gm TID and Flagyl 500 mg TID recommended for 6 weeks.   Today pt feels OK, she denies fever/chills, c/o abdominal discomfort around the drain.       ALLERGIES:  No Known Allergies      PMHX/PSHX:  Diabetes    HTN (hypertension)    Gastric ulcer    S/P tonsillectomy    S/P appendectomy        FAMILY HISTORY:    Family history of early CAD    Vital Signs Last 24 Hrs  T(C): 35.9 (16 Sep 2020 12:18), Max: 37.3 (16 Sep 2020 04:55)  T(F): 96.7 (16 Sep 2020 12:18), Max: 99.1 (16 Sep 2020 04:55)  HR: 89 (16 Sep 2020 12:18) (84 - 89)  BP: 132/77 (16 Sep 2020 12:18) (121/71 - 143/73)  BP(mean): --  RR: 18 (16 Sep 2020 12:18) (18 - 18)  SpO2: 92% (16 Sep 2020 09:20) (92% - 92%)    PHYSICAL EXAM:   GENERAL: NAD, pleasant   CHEST:  Full & symmetric excursion, no increased effort, breath sounds clear  HEART:  Regular rhythm, S1, S2, no murmur/rub/S3/S4, no abdominal bruit, no edema  ABDOMEN:  Soft, non-tender, non-distended, normoactive bowel sounds, drain noted with collecting container ( around 5 ml of bloody fluids )   EXTEREMITIES:  no edema   SKIN:  No rash/erythema/ecchymoses/petechiae/wounds/abscess/warm/dry  NEURO:  Alert, oriented, no asterixis, no tremor, no focal neuro deficit     LABS:                                   8.0    5.34  )-----------( 521      ( 16 Sep 2020 05:46 )             26.9   09-16    139  |  103  |  13  ----------------------------<  94  4.2   |  26  |  0.7    Ca    8.1<L>      16 Sep 2020 05:46  Mg     1.9     09-16    TPro  5.7<L>  /  Alb  2.8<L>  /  TBili  0.7  /  DBili  x   /  AST  39  /  ALT  33  /  AlkPhos  100  09-16       RADIOLOGY:  `      < from: CT Abdomen and Pelvis w/ IV Cont (09.15.20 @ 13:45) >    IMPRESSION:    1.  Interval placement of drainage catheter into left hepatic lobe abscess with slight interval decrease in size of abscess.  2.  New findings of duodenitis, possibly reactive. 1.2 cm periduodenal fluid loculation/collection, new from prior.  3.  Redemonstration of findings of known left hip septic arthritis.    MEDICATIONS  (STANDING):  apixaban 5 milliGRAM(s) Oral every 12 hours  cefTRIAXone   IVPB 2000 milliGRAM(s) IV Intermittent every 24 hours  chlorhexidine 4% Liquid 1 Application(s) Topical <User Schedule>  levothyroxine 50 MICROGram(s) Oral daily  lisinopril 5 milliGRAM(s) Oral daily  metroNIDAZOLE  IVPB 500 milliGRAM(s) IV Intermittent every 8 hours  pantoprazole   Suspension 40 milliGRAM(s) Oral two times a day  senna 2 Tablet(s) Oral at bedtime  sucralfate 1 Gram(s) Oral every 12 hours    MEDICATIONS  (PRN):  acetaminophen  Suppository .. 650 milliGRAM(s) Rectal every 6 hours PRN Temp greater or equal to 38C (100.4F)  lidocaine   Patch 1 Patch Transdermal daily PRN chest pain  oxyCODONE    IR 10 milliGRAM(s) Oral three times a day PRN Severe Pain (7 - 10)  polyethylene glycol 3350 17 Gram(s) Oral daily PRN Constipation

## 2020-09-16 NOTE — OCCUPATIONAL THERAPY INITIAL EVALUATION ADULT - PERSONAL SAFETY AND JUDGMENT, REHAB EVAL
Pt demonstrated fair judgement and required verbal cues to maintain her weight bearing precautions./intact

## 2020-09-17 VITALS
RESPIRATION RATE: 18 BRPM | DIASTOLIC BLOOD PRESSURE: 60 MMHG | HEART RATE: 94 BPM | TEMPERATURE: 98 F | SYSTOLIC BLOOD PRESSURE: 126 MMHG

## 2020-09-17 LAB
BASOPHILS # BLD AUTO: 0.03 K/UL — SIGNIFICANT CHANGE UP (ref 0–0.2)
BASOPHILS NFR BLD AUTO: 0.6 % — SIGNIFICANT CHANGE UP (ref 0–1)
EOSINOPHIL # BLD AUTO: 0.11 K/UL — SIGNIFICANT CHANGE UP (ref 0–0.7)
EOSINOPHIL NFR BLD AUTO: 2 % — SIGNIFICANT CHANGE UP (ref 0–8)
HCT VFR BLD CALC: 26.8 % — LOW (ref 37–47)
HGB BLD-MCNC: 8.3 G/DL — LOW (ref 12–16)
IMM GRANULOCYTES NFR BLD AUTO: 1.3 % — HIGH (ref 0.1–0.3)
LYMPHOCYTES # BLD AUTO: 1.98 K/UL — SIGNIFICANT CHANGE UP (ref 1.2–3.4)
LYMPHOCYTES # BLD AUTO: 36.3 % — SIGNIFICANT CHANGE UP (ref 20.5–51.1)
MCHC RBC-ENTMCNC: 28.9 PG — SIGNIFICANT CHANGE UP (ref 27–31)
MCHC RBC-ENTMCNC: 31 G/DL — LOW (ref 32–37)
MCV RBC AUTO: 93.4 FL — SIGNIFICANT CHANGE UP (ref 81–99)
MONOCYTES # BLD AUTO: 0.54 K/UL — SIGNIFICANT CHANGE UP (ref 0.1–0.6)
MONOCYTES NFR BLD AUTO: 9.9 % — HIGH (ref 1.7–9.3)
NEUTROPHILS # BLD AUTO: 2.72 K/UL — SIGNIFICANT CHANGE UP (ref 1.4–6.5)
NEUTROPHILS NFR BLD AUTO: 49.9 % — SIGNIFICANT CHANGE UP (ref 42.2–75.2)
NRBC # BLD: 0 /100 WBCS — SIGNIFICANT CHANGE UP (ref 0–0)
PLATELET # BLD AUTO: 535 K/UL — HIGH (ref 130–400)
RBC # BLD: 2.87 M/UL — LOW (ref 4.2–5.4)
RBC # FLD: 13.7 % — SIGNIFICANT CHANGE UP (ref 11.5–14.5)
WBC # BLD: 5.45 K/UL — SIGNIFICANT CHANGE UP (ref 4.8–10.8)
WBC # FLD AUTO: 5.45 K/UL — SIGNIFICANT CHANGE UP (ref 4.8–10.8)

## 2020-09-17 PROCEDURE — 99233 SBSQ HOSP IP/OBS HIGH 50: CPT

## 2020-09-17 RX ADMIN — APIXABAN 5 MILLIGRAM(S): 2.5 TABLET, FILM COATED ORAL at 05:52

## 2020-09-17 RX ADMIN — Medication 100 MILLIGRAM(S): at 13:21

## 2020-09-17 RX ADMIN — Medication 100 MILLIGRAM(S): at 05:55

## 2020-09-17 RX ADMIN — Medication 50 MICROGRAM(S): at 05:52

## 2020-09-17 RX ADMIN — PANTOPRAZOLE SODIUM 40 MILLIGRAM(S): 20 TABLET, DELAYED RELEASE ORAL at 05:52

## 2020-09-17 RX ADMIN — Medication 1 GRAM(S): at 05:52

## 2020-09-17 RX ADMIN — PANTOPRAZOLE SODIUM 40 MILLIGRAM(S): 20 TABLET, DELAYED RELEASE ORAL at 17:03

## 2020-09-17 RX ADMIN — CEFTRIAXONE 100 MILLIGRAM(S): 500 INJECTION, POWDER, FOR SOLUTION INTRAMUSCULAR; INTRAVENOUS at 11:10

## 2020-09-17 RX ADMIN — LISINOPRIL 5 MILLIGRAM(S): 2.5 TABLET ORAL at 05:52

## 2020-09-17 RX ADMIN — Medication 1 GRAM(S): at 17:03

## 2020-09-17 RX ADMIN — APIXABAN 5 MILLIGRAM(S): 2.5 TABLET, FILM COATED ORAL at 17:03

## 2020-09-17 RX ADMIN — CHLORHEXIDINE GLUCONATE 1 APPLICATION(S): 213 SOLUTION TOPICAL at 05:52

## 2020-09-17 NOTE — PROGRESS NOTE ADULT - NSHPATTENDINGPLANDISCUSS_GEN_ALL_CORE
House staff
house staff, resident
house staff, resident
House staff, friend
house staff
house staff, ID attending and family
house staff, ID attending and family
house staff, residents, ortho attending
team
House staff
house staff, residents and family

## 2020-09-17 NOTE — PROGRESS NOTE ADULT - ASSESSMENT
Patient is a 65 y/o woman with PMH of HTN, PUD, and hypothyroidism presenting for SOB. Found to have PE. Course complicated by necrosis of femoral head; cause unknown. Patient found to have hypoechogenicity on US of liver; she was diagnosed with a liver  abscess which was drained by IR.   Pt needs long term Abx course until October 23 rd ( will discharge home on po Abx)       #Pulmonary embolism s/p thrombectomy   -Had thrombectomy on 8/31  - on Eliquis restarted 09/12  - stable on RA now     #Liver abscess  - s/p drainage, pt has minimal output, will c/w drainage on discharge, f/u with IR as an outpt   - repeat CT findings noted, IR follow up before discharge   -Liver abscess cx growing Strep intermedius   -ID consulted, will discharge home on po  Amoxicillin 1 gm TID and Flagyl 500 mg TID for 6 weeks ( starting from the day of drainage)   - monitor LFTs     #L Femoral head necrosis   -per ortho : pt needs  elective MARIA C when medically cleared   - s/p IR guided drainage  - Blood Cx 9/3 NG, 9/5 BCX NGTD   - Hip Cx 9/4 NGTD  - PT as tolerated, pt is ambulating with a walker        #PUD/ Duodenitis   -EGD outpatient showing deep ulcer  -Protonix 40 BID  -Carafate 1gm BID  - c/w Abx as per ID     #Iron Deficiency anemia s/p IV  Venofer 200mg 09/03/2020  -Monitor hemoglobin  -Transfuse if <7.5  - keep type and cross active     #HTN  -DASH  - on Lisinopril    #Spinal stenosis  -PT/Rehab  -Pain control  -Fall precautions    # Hypothyroids   - c/w Synthroid     #Progress Note Handoff  Pending (specify):  IR follow up before discharge   Family discussion: plan of care and discharge planning discussed with pt and her friend at length   Disposition: Home_x __/SNF___/Other________/Unknown at this time_______

## 2020-09-17 NOTE — PROGRESS NOTE ADULT - REASON FOR ADMISSION
pulmonary embolism

## 2020-09-17 NOTE — PROGRESS NOTE ADULT - SUBJECTIVE AND OBJECTIVE BOX
64 year-old female with a PMH of PUD, HTN and hypothyroidism and a PSH of appendectomy and tonsillectomy presenting for shortness of breath, weakness and dizziness. The patient was found to have submassive pulmonary embolism and underwent thrombectomy on 8/31/20 with IR. The patient was anticoagulated with Heparin ggt. She was followed by GI for PUD.  Pt was downgraded to telemetry from ICU on 9/1/20. While in the hospital pt was diagnosed with iron deficiency amenia.   MRI of the left hip showed significant joint effusion, case discussed with ortho attending, IR consulted , pt underwent arthrocentesis. .  While in the hospital pt is spiking fever, she was diagnosed with a liver abscess which was drained by IR, pt is on IV Abx now, repeat CT abdomen was significant for duodenitis. Case d/w ID attending, pt needs 6 weeks of IV Abx, pt is uninsured, will consider po alternatives on discharge, po Amoxicillin 1 gm TID and Flagyl 500 mg TID recommended for 6 weeks.   Today pt feels better today, she was able to ambulate with a walker, denies abdominal pain, has normal stool ( diarrhea resolved).       ALLERGIES:  No Known Allergies      PMHX/PSHX:  Diabetes    HTN (hypertension)    Gastric ulcer    S/P tonsillectomy    S/P appendectomy        FAMILY HISTORY:    Family history of early CAD    Vital Signs Last 24 Hrs  T(C): 37.2 (17 Sep 2020 04:56), Max: 37.2 (17 Sep 2020 04:56)  T(F): 98.9 (17 Sep 2020 04:56), Max: 98.9 (17 Sep 2020 04:56)  HR: 88 (17 Sep 2020 04:56) (84 - 94)  BP: 156/96 (17 Sep 2020 04:56) (131/74 - 156/96)  BP(mean): --  RR: 18 (17 Sep 2020 04:56) (18 - 18)  SpO2: 95% (17 Sep 2020 08:17) (92% - 95%)    PHYSICAL EXAM:   GENERAL: NAD, pleasant   CHEST:  CTA b/l   HEART:  Regular rhythm, S1, S2, no murmur/rub/S3/S4, no abdominal bruit, no edema  ABDOMEN:  Soft, non-tender, non-distended, normoactive bowel sounds, drain noted with collecting container ( around 5 ml of bloody fluids )   EXTEREMITIES:  no edema   SKIN:  No rash/erythema/ecchymoses/petechiae/wounds/abscess/warm/dry  NEURO:  Alert, oriented, no asterixis, no tremor, no focal neuro deficit     LABS:                                   8.3    5.45  )-----------( 535      ( 17 Sep 2020 06:19 )             26.8   09-17    137  |  102  |  10  ----------------------------<  102<H>  3.9   |  25  |  0.7    Ca    8.3<L>      17 Sep 2020 06:19  Mg     1.8     09-17    TPro  5.7<L>  /  Alb  2.8<L>  /  TBili  0.7  /  DBili  x   /  AST  39  /  ALT  33  /  AlkPhos  100  09-16         RADIOLOGY:  `      < from: CT Abdomen and Pelvis w/ IV Cont (09.15.20 @ 13:45) >    IMPRESSION:    1.  Interval placement of drainage catheter into left hepatic lobe abscess with slight interval decrease in size of abscess.  2.  New findings of duodenitis, possibly reactive. 1.2 cm periduodenal fluid loculation/collection, new from prior.  3.  Redemonstration of findings of known left hip septic arthritis.    MEDICATIONS  (STANDING):  apixaban 5 milliGRAM(s) Oral every 12 hours  cefTRIAXone   IVPB 2000 milliGRAM(s) IV Intermittent every 24 hours  chlorhexidine 4% Liquid 1 Application(s) Topical <User Schedule>  levothyroxine 50 MICROGram(s) Oral daily  lisinopril 5 milliGRAM(s) Oral daily  metroNIDAZOLE  IVPB 500 milliGRAM(s) IV Intermittent every 8 hours  pantoprazole   Suspension 40 milliGRAM(s) Oral two times a day  senna 2 Tablet(s) Oral at bedtime  sucralfate 1 Gram(s) Oral every 12 hours    MEDICATIONS  (PRN):  acetaminophen  Suppository .. 650 milliGRAM(s) Rectal every 6 hours PRN Temp greater or equal to 38C (100.4F)  lidocaine   Patch 1 Patch Transdermal daily PRN chest pain  oxyCODONE    IR 10 milliGRAM(s) Oral three times a day PRN Severe Pain (7 - 10)  polyethylene glycol 3350 17 Gram(s) Oral daily PRN Constipation

## 2020-09-18 LAB
CULTURE RESULTS: SIGNIFICANT CHANGE UP
SPECIMEN SOURCE: SIGNIFICANT CHANGE UP

## 2020-09-21 DIAGNOSIS — I82.432 ACUTE EMBOLISM AND THROMBOSIS OF LEFT POPLITEAL VEIN: ICD-10-CM

## 2020-09-21 DIAGNOSIS — I11.0 HYPERTENSIVE HEART DISEASE WITH HEART FAILURE: ICD-10-CM

## 2020-09-21 DIAGNOSIS — I82.452 ACUTE EMBOLISM AND THROMBOSIS OF LEFT PERONEAL VEIN: ICD-10-CM

## 2020-09-21 DIAGNOSIS — N39.0 URINARY TRACT INFECTION, SITE NOT SPECIFIED: ICD-10-CM

## 2020-09-21 DIAGNOSIS — K25.9 GASTRIC ULCER, UNSPECIFIED AS ACUTE OR CHRONIC, WITHOUT HEMORRHAGE OR PERFORATION: ICD-10-CM

## 2020-09-21 DIAGNOSIS — D50.9 IRON DEFICIENCY ANEMIA, UNSPECIFIED: ICD-10-CM

## 2020-09-21 DIAGNOSIS — M89.752 MAJOR OSSEOUS DEFECT, LEFT PELVIC REGION AND THIGH: ICD-10-CM

## 2020-09-21 DIAGNOSIS — E03.9 HYPOTHYROIDISM, UNSPECIFIED: ICD-10-CM

## 2020-09-21 DIAGNOSIS — M87.9 OSTEONECROSIS, UNSPECIFIED: ICD-10-CM

## 2020-09-21 DIAGNOSIS — E87.1 HYPO-OSMOLALITY AND HYPONATREMIA: ICD-10-CM

## 2020-09-21 DIAGNOSIS — J96.00 ACUTE RESPIRATORY FAILURE, UNSPECIFIED WHETHER WITH HYPOXIA OR HYPERCAPNIA: ICD-10-CM

## 2020-09-21 DIAGNOSIS — I50.31 ACUTE DIASTOLIC (CONGESTIVE) HEART FAILURE: ICD-10-CM

## 2020-09-21 DIAGNOSIS — Z90.49 ACQUIRED ABSENCE OF OTHER SPECIFIED PARTS OF DIGESTIVE TRACT: ICD-10-CM

## 2020-09-21 DIAGNOSIS — I26.94 MULTIPLE SUBSEGMENTAL PULMONARY EMBOLI WITHOUT ACUTE COR PULMONALE: ICD-10-CM

## 2020-09-21 DIAGNOSIS — E66.9 OBESITY, UNSPECIFIED: ICD-10-CM

## 2020-09-21 DIAGNOSIS — K75.0 ABSCESS OF LIVER: ICD-10-CM

## 2020-09-21 DIAGNOSIS — M25.452 EFFUSION, LEFT HIP: ICD-10-CM

## 2020-09-21 DIAGNOSIS — B95.4 OTHER STREPTOCOCCUS AS THE CAUSE OF DISEASES CLASSIFIED ELSEWHERE: ICD-10-CM

## 2020-09-21 DIAGNOSIS — M51.36 OTHER INTERVERTEBRAL DISC DEGENERATION, LUMBAR REGION: ICD-10-CM

## 2020-09-21 DIAGNOSIS — R06.02 SHORTNESS OF BREATH: ICD-10-CM

## 2020-09-21 DIAGNOSIS — R74.0 NONSPECIFIC ELEVATION OF LEVELS OF TRANSAMINASE AND LACTIC ACID DEHYDROGENASE [LDH]: ICD-10-CM

## 2020-09-21 DIAGNOSIS — B96.20 UNSPECIFIED ESCHERICHIA COLI [E. COLI] AS THE CAUSE OF DISEASES CLASSIFIED ELSEWHERE: ICD-10-CM

## 2020-09-21 DIAGNOSIS — R26.89 OTHER ABNORMALITIES OF GAIT AND MOBILITY: ICD-10-CM

## 2020-09-21 DIAGNOSIS — Z79.01 LONG TERM (CURRENT) USE OF ANTICOAGULANTS: ICD-10-CM

## 2020-09-21 DIAGNOSIS — I27.20 PULMONARY HYPERTENSION, UNSPECIFIED: ICD-10-CM

## 2020-10-31 LAB
CULTURE RESULTS: SIGNIFICANT CHANGE UP
SPECIMEN SOURCE: SIGNIFICANT CHANGE UP

## 2021-10-01 NOTE — DISCHARGE NOTE NURSING/CASE MANAGEMENT/SOCIAL WORK - NSPROEXTENSIONSOFSELF_GEN_A_NUR
PLASTIC SURGERY PROCEDURE NOTE     DATE:  10/1/2021     PREOPERATIVE DIAGNOSIS:    1. Left cheek/lower eyelid skin lesion  2. Left temple skin lesion    POSTOPERATIVE DIAGNOSIS:  same    OPERATIVE PROCEDURE:    1. Excision of left cheek/lower eyelid 1 cm x 1.3cm skin lesion.   2. Repair of 1.3 cm defect of the left cheek/lower eyelid, with intermediate closure.  3. Excision of left temple 0.6 cm x 1.2 cm skin lesion  4. Repair of 1.2 cm defect of the left temple, with intermediate closure.  Total intermediate repair 2.5 cm    ANESTHESIA: 5 mL 1% lidocaine with epinephrine     ESTIMATED BLOOD LOSS - Minimal    COMPLICATIONS - None     DISPOSITION - Discharged home    OPERATIVE PROCEDURE:  This patient presents for excision the lesions as described above.  The lesions were marked and the area prepped.  The surrounding tissue was anesthetized with a total of 5 cc of Lidocaine with epinephrine by infiltrating in the area of the surgery.The surgical site was prepped and draped in the usual sterile fashion.     The skin surrounding the lesion of the left temple and left cheek/lower eyelid was incised with a 15 blade.  The lesions were dissected from the surrounding soft tissue with sharp  dissection.   The specimen were labeled short stitch superior, long stitch lateral.  The size of the defect created was as above.     The defects were irrigated thoroughly with sterile saline.  Bleeding was controlled with bipolar.  The tissues were closed in layers.  Interrupted 4-0 Monocryl was used for the deep dermal closure.  Running 6-0 prolene was used to close the skin.  This constituted a intermediate closure. Of note the left lower eyelid/cheek was closed in a vertical fashion so as not to cause tension on the lower eyelid and cause any lower eyelid malposition.    The patient tolerated the procedure well.  steristrips were applied along incision.  The patient was discharged home to be followed on an outpatient basis.  Wound  care instructions and follow-up appointment were provided.     Kandis Shabazz MD MS  Plastic and Reconstructive Surgery       none

## 2021-10-02 NOTE — H&P ADULT - DOES THIS PATIENT HAVE A HISTORY OF OR HAS BEEN DX WITH HEART FAILURE?
--Patient provided with discharge instructions and any prescriptions. --Instructions, dosing, and follow-up appointments reviewed with patient/family. No further questions or needs at this time. --Vital signs and patient stable upon discharge. --Patient ambulatory to [facility].        Tabatha Shin RN  10/02/21 1809
no

## 2021-10-13 NOTE — OCCUPATIONAL THERAPY INITIAL EVALUATION ADULT - PERTINENT HX OF CURRENT PROBLEM, REHAB EVAL
[FreeTextEntry1] : Attends [employer]. Email confirmed\par  The patient is a 64 year-old female with a PMH of HTN and hypothyroidism and a PSH of appendectomy and tonsillectomy presenting for shortness of breath.

## 2022-09-03 NOTE — PROGRESS NOTE ADULT - SUBJECTIVE AND OBJECTIVE BOX
64 year-old female with a PMH of PUD, HTN and hypothyroidism and a PSH of appendectomy and tonsillectomy presenting for shortness of breath, weakness and dizziness. The patient was found to have submassive pulmonary embolism and underwent thrombectomy on 8/31/20 with IR. The patient was anticoagulated with Heparin ggt. She was followed by GI for PUD.  Pt was downgraded to telemetry from ICU on 9/1/20. While in the hospital pt was diagnosed with iron deficiency amenia.   MRI of the left hip showed significant joint effusion, case discussed with ortho attending, IR consulted , pt underwent arthrocentesis. .  While in the hospital pt is spiking fever, and LFts elevated - found to have liver abscess on CT abdomen - going for drainage today    PAST MEDICAL & SURGICAL HISTORY  HTN (hypertension)  Gastric ulcer  S/P tonsillectomy  S/P appendectomy      ALLERGIES:  No Known Allergies    Vital Signs Last 24 Hrs  Vital Signs Last 24 Hrs  T(C): 37 (11 Sep 2020 13:10), Max: 38.2 (11 Sep 2020 05:00)  T(F): 98.6 (11 Sep 2020 13:10), Max: 100.8 (11 Sep 2020 05:00)  HR: 79 (11 Sep 2020 13:10) (79 - 90)  BP: 129/72 (11 Sep 2020 13:10) (115/59 - 129/72)  BP(mean): --  RR: 18 (11 Sep 2020 13:10) (18 - 18)  SpO2: 94% (11 Sep 2020 08:28) (93% - 94%)      PHYSICAL EXAM:  GEN: No acute distress  HEENT: normocephalic, atraumatic  LUNGS: decreased breath sounds at bases   HEART: S1/S2 present. RRR, no murmurs  ABD: Soft, non-tender, non-distended. Bowel sounds present  EXT: NC/NC/NE/2+PP/FRANCES, decreased ROM in LLE due to pain   NEURO: AAOX3, normal affect, no focal neuro deficit       LABS:                             7.6    6.22  )-----------( 214      ( 11 Sep 2020 06:52 )             24.9   09-11    138  |  102  |  13  ----------------------------<  108<H>  3.8   |  24  |  0.7    Ca    8.1<L>      11 Sep 2020 06:52  Mg     2.0     09-11    TPro  5.7<L>  /  Alb  2.8<L>  /  TBili  0.2  /  DBili  x   /  AST  32  /  ALT  56<H>  /  AlkPhos  138<H>  09-11      Culture - Body Fluid with Gram Stain (09.04.20 @ 10:30)    Gram Stain:   No polymorphonuclear cells seen  No organisms seen    Specimen Source: .Body Fluid Joint Fluid, Left Hip    Culture - Blood (09.03.20 @ 20:52)    Specimen Source: .Blood None    Culture Results:   No growth to date.    Culture - Urine (09.03.20 @ 01:30)    Specimen Source: .Urine Clean Catch (Midstream)    Culture Results:   >100,000 CFU/ml Escherichia coli      RADIOLOGY:  < from: VA Duplex Lower Ext Vein Scan, Bilat (08.31.20 @ 17:39) >  Impression:    No evidence of deep venous thrombosis or superficial thrombophlebitis in the right lower extremity.    Acute left popliteal, posterior tibial peroneal vein DVT    < end of copied text >    < from: IR Procedure (08.31.20 @ 12:48) >  FINDINGS:    1. Preliminary ultrasound demonstrates patent right common femoral vein.    2. Right pulmonary artery angiogram: Extensive filling defects within the right main pulmonary artery extending into the upper and lower lobes.    3. Left pulmonary artery angiogram: Small filling defects within the left lower lobe pulmonary arterial segmental branches with oligemia.    4. Successful mechanical suction thrombectomy in right main pulmonary artery as well as left lower lobe segmental pulmonary artery utilizing Inari suction thrombectomy catheter.    < end of copied text >  < from: Transthoracic Echocardiogram (08.31.20 @ 13:18) >  Summary:   1. Normal global left ventricular systolic function.   2. LV Ejection Fraction by Hardin's Method with a biplane EF of 59 %.   3. Mild left ventricular hypertrophy.   4. Mildly increased LV wall thickness.   5. Spectral Doppler shows impaired relaxation pattern of left ventricular myocardial filling (Grade I diastolic dysfunction).   6. Normal left atrial size.   7. Normal right atrial size.   8. Structurally normal mitral valve, with normal leaflet excursion.   9. Trace mitral valve regurgitation.  10. Mild-moderate tricuspid regurgitation.  11. Sclerotic aortic valve with normal opening.  12. Mild pulmonic valve regurgitation.  13. Estimated pulmonary artery systolic pressure is 47.5 mmHg assuming a right atrial pressure of 5 mmHg, which is consistent with mild pulmonary hypertension.  14. LA volume Index is 14.6 ml/m² ml/m2.    < from: MR Hip w/wo IV Cont, Left (09.02.20 @ 19:42) >  IMPRESSION:    1. 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 Associated stress related sclerosis/edema in the left femoral neck and left acetabulum. Extensive surrounding edema/inflammation. Given these findings, septic arthritis cannot be excluded.  2. Large heterogeneous joint effusion may be due to internal hemorrhage.  3. Apparent tendon tears as above.    < end of copied text >    S/p image guided left hip aspiration, obtaining approx 5cc thick purulent fluid using a 5Fr [employer] needle. Specimen given to primary team resident.    < from: CT Abdomen and Pelvis w/ IV Cont (09.08.20 @ 22:42) >  IMPRESSION:    1.  6.1 x 4.9 x 6.0 cm multiloculated hypoattenuating structure in the left hepatic lobe, compatible with an abscess in the appropriate clinical setting. Mild associated left intrahepatic biliary ductal dilatation. Recommend imaging follow up after treatment to assess for resolution.  2.  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      < end of copied text >    MEDICATIONS  (STANDING):  cefTRIAXone   IVPB 2000 milliGRAM(s) IV Intermittent every 24 hours  chlorhexidine 4% Liquid 1 Application(s) Topical <User Schedule>  levothyroxine 50 MICROGram(s) Oral daily  lisinopril 5 milliGRAM(s) Oral daily  magnesium oxide 400 milliGRAM(s) Oral three times a day with meals  metroNIDAZOLE  IVPB 500 milliGRAM(s) IV Intermittent every 8 hours  pantoprazole   Suspension 40 milliGRAM(s) Oral two times a day  polyethylene glycol 3350 17 Gram(s) Oral daily  sodium chloride 0.9%. 1000 milliLiter(s) (75 mL/Hr) IV Continuous <Continuous>  sucralfate 1 Gram(s) Oral every 12 hours    MEDICATIONS  (PRN):  acetaminophen  Suppository .. 650 milliGRAM(s) Rectal every 6 hours PRN Temp greater or equal to 38C (100.4F)  bisacodyl 5 milliGRAM(s) Oral every 12 hours PRN Constipation  lactulose Syrup 10 Gram(s) Oral two times a day PRN constipation  lidocaine   Patch 1 Patch Transdermal daily PRN chest pain None

## 2023-03-28 NOTE — PHYSICAL THERAPY INITIAL EVALUATION ADULT - DID THE PATIENT HAVE SURGERY?
Will follow-up when further orders given.
aspiration of L hip to r/o infection/yes
Tazorac Counseling:  Patient advised that medication is irritating and drying.  Patient may need to apply sparingly and wash off after an hour before eventually leaving it on overnight.  The patient verbalized understanding of the proper use and possible adverse effects of tazorac.  All of the patient's questions and concerns were addressed.